# Patient Record
Sex: FEMALE | Race: WHITE | NOT HISPANIC OR LATINO | URBAN - METROPOLITAN AREA
[De-identification: names, ages, dates, MRNs, and addresses within clinical notes are randomized per-mention and may not be internally consistent; named-entity substitution may affect disease eponyms.]

---

## 2017-01-22 ENCOUNTER — INPATIENT (INPATIENT)
Facility: HOSPITAL | Age: 82
LOS: 7 days | Discharge: HOME | DRG: 292 | End: 2017-01-30
Attending: INTERNAL MEDICINE | Admitting: INTERNAL MEDICINE
Payer: MEDICARE

## 2017-01-22 VITALS
WEIGHT: 134.92 LBS | RESPIRATION RATE: 16 BRPM | OXYGEN SATURATION: 98 % | DIASTOLIC BLOOD PRESSURE: 98 MMHG | HEART RATE: 67 BPM | HEIGHT: 61 IN | TEMPERATURE: 98 F | SYSTOLIC BLOOD PRESSURE: 222 MMHG

## 2017-01-22 PROCEDURE — 71010: CPT | Mod: 26

## 2017-01-22 PROCEDURE — 70450 CT HEAD/BRAIN W/O DYE: CPT | Mod: 26

## 2017-01-22 NOTE — ED PROVIDER NOTE - MEDICAL DECISION MAKING DETAILS
Pt is a 84F with HTN, generalized weakness. labs done. CT head done. pt admitted for atypical presentation of ACS. Pt

## 2017-01-22 NOTE — ED PROVIDER NOTE - OBJECTIVE STATEMENT
Pt is a 84F who presents to ED for high blood pressure today. pt has significant hx of HTN and DM and chronic headaches for the past month. Pt states she has been having a constant dull headache for the past month. Today Pt is a 84F who presents to ED for high blood pressure and dizziness today. pt has significant hx of HTN and DM and chronic headaches for the past month. Pt states she has been having a constant dull headache for the past month. Today, pt complains that her blood pressure has been elevated all day despite taking prescribed medications. Pt has no numbness or tingling, no SOB. PMD: Dr. Ambriz

## 2017-01-23 DIAGNOSIS — I10 ESSENTIAL (PRIMARY) HYPERTENSION: ICD-10-CM

## 2017-01-23 DIAGNOSIS — A60.09 HERPESVIRAL INFECTION OF OTHER UROGENITAL TRACT: ICD-10-CM

## 2017-01-23 DIAGNOSIS — Z29.9 ENCOUNTER FOR PROPHYLACTIC MEASURES, UNSPECIFIED: ICD-10-CM

## 2017-01-23 DIAGNOSIS — R51 HEADACHE: ICD-10-CM

## 2017-01-23 DIAGNOSIS — E11.9 TYPE 2 DIABETES MELLITUS WITHOUT COMPLICATIONS: ICD-10-CM

## 2017-01-23 LAB
ALBUMIN SERPL ELPH-MCNC: 4 G/DL — SIGNIFICANT CHANGE UP (ref 3.5–5)
ALP SERPL-CCNC: 66 U/L — SIGNIFICANT CHANGE UP (ref 40–120)
ALT FLD-CCNC: 24 U/L DA — SIGNIFICANT CHANGE UP (ref 10–60)
ANION GAP SERPL CALC-SCNC: 11 MMOL/L — SIGNIFICANT CHANGE UP (ref 5–17)
APPEARANCE UR: CLEAR — SIGNIFICANT CHANGE UP
AST SERPL-CCNC: 29 U/L — SIGNIFICANT CHANGE UP (ref 10–40)
BASOPHILS # BLD AUTO: 0.1 K/UL — SIGNIFICANT CHANGE UP (ref 0–0.2)
BASOPHILS NFR BLD AUTO: 0.8 % — SIGNIFICANT CHANGE UP (ref 0–2)
BILIRUB SERPL-MCNC: 0.4 MG/DL — SIGNIFICANT CHANGE UP (ref 0.2–1.2)
BILIRUB UR-MCNC: NEGATIVE — SIGNIFICANT CHANGE UP
BUN SERPL-MCNC: 28 MG/DL — HIGH (ref 7–18)
CALCIUM SERPL-MCNC: 9 MG/DL — SIGNIFICANT CHANGE UP (ref 8.4–10.5)
CHLORIDE SERPL-SCNC: 96 MMOL/L — SIGNIFICANT CHANGE UP (ref 96–108)
CK MB BLD-MCNC: <3 % — SIGNIFICANT CHANGE UP (ref 0–3.5)
CK MB CFR SERPL CALC: <1 NG/ML — SIGNIFICANT CHANGE UP (ref 0–3.6)
CK SERPL-CCNC: 33 U/L — SIGNIFICANT CHANGE UP (ref 21–215)
CO2 SERPL-SCNC: 29 MMOL/L — SIGNIFICANT CHANGE UP (ref 22–31)
COLOR SPEC: YELLOW — SIGNIFICANT CHANGE UP
CREAT SERPL-MCNC: 0.93 MG/DL — SIGNIFICANT CHANGE UP (ref 0.5–1.3)
DIFF PNL FLD: NEGATIVE — SIGNIFICANT CHANGE UP
EOSINOPHIL # BLD AUTO: 0.3 K/UL — SIGNIFICANT CHANGE UP (ref 0–0.5)
EOSINOPHIL NFR BLD AUTO: 2.7 % — SIGNIFICANT CHANGE UP (ref 0–6)
GLUCOSE SERPL-MCNC: 204 MG/DL — HIGH (ref 70–99)
GLUCOSE UR QL: 50 MG/DL
HCT VFR BLD CALC: 35.9 % — SIGNIFICANT CHANGE UP (ref 34.5–45)
HGB BLD-MCNC: 12.1 G/DL — SIGNIFICANT CHANGE UP (ref 11.5–15.5)
KETONES UR-MCNC: NEGATIVE — SIGNIFICANT CHANGE UP
LEUKOCYTE ESTERASE UR-ACNC: NEGATIVE — SIGNIFICANT CHANGE UP
LYMPHOCYTES # BLD AUTO: 1.4 K/UL — SIGNIFICANT CHANGE UP (ref 1–3.3)
LYMPHOCYTES # BLD AUTO: 14.1 % — SIGNIFICANT CHANGE UP (ref 13–44)
MCHC RBC-ENTMCNC: 26.4 PG — LOW (ref 27–34)
MCHC RBC-ENTMCNC: 33.6 GM/DL — SIGNIFICANT CHANGE UP (ref 32–36)
MCV RBC AUTO: 78.5 FL — LOW (ref 80–100)
MONOCYTES # BLD AUTO: 0.4 K/UL — SIGNIFICANT CHANGE UP (ref 0–0.9)
MONOCYTES NFR BLD AUTO: 4.3 % — SIGNIFICANT CHANGE UP (ref 2–14)
NEUTROPHILS # BLD AUTO: 7.7 K/UL — HIGH (ref 1.8–7.4)
NEUTROPHILS NFR BLD AUTO: 78.1 % — HIGH (ref 43–77)
NITRITE UR-MCNC: NEGATIVE — SIGNIFICANT CHANGE UP
PH UR: 7 — SIGNIFICANT CHANGE UP (ref 4.8–8)
PLATELET # BLD AUTO: 296 K/UL — SIGNIFICANT CHANGE UP (ref 150–400)
POTASSIUM SERPL-MCNC: 4.4 MMOL/L — SIGNIFICANT CHANGE UP (ref 3.5–5.3)
POTASSIUM SERPL-SCNC: 4.4 MMOL/L — SIGNIFICANT CHANGE UP (ref 3.5–5.3)
PROT SERPL-MCNC: 7.8 G/DL — SIGNIFICANT CHANGE UP (ref 6–8.3)
PROT UR-MCNC: 100
RBC # BLD: 4.57 M/UL — SIGNIFICANT CHANGE UP (ref 3.8–5.2)
RBC # FLD: 12.7 % — SIGNIFICANT CHANGE UP (ref 10.3–14.5)
SODIUM SERPL-SCNC: 136 MMOL/L — SIGNIFICANT CHANGE UP (ref 135–145)
SP GR SPEC: 1.01 — SIGNIFICANT CHANGE UP (ref 1.01–1.02)
TROPONIN I SERPL-MCNC: <0.015 NG/ML — SIGNIFICANT CHANGE UP (ref 0–0.04)
UROBILINOGEN FLD QL: NEGATIVE — SIGNIFICANT CHANGE UP
WBC # BLD: 9.9 K/UL — SIGNIFICANT CHANGE UP (ref 3.8–10.5)
WBC # FLD AUTO: 9.9 K/UL — SIGNIFICANT CHANGE UP (ref 3.8–10.5)

## 2017-01-23 PROCEDURE — 99285 EMERGENCY DEPT VISIT HI MDM: CPT | Mod: 25

## 2017-01-23 RX ORDER — ACETAMINOPHEN 500 MG
650 TABLET ORAL EVERY 6 HOURS
Qty: 0 | Refills: 0 | Status: DISCONTINUED | OUTPATIENT
Start: 2017-01-23 | End: 2017-01-30

## 2017-01-23 RX ORDER — INSULIN LISPRO 100/ML
VIAL (ML) SUBCUTANEOUS
Qty: 0 | Refills: 0 | Status: DISCONTINUED | OUTPATIENT
Start: 2017-01-23 | End: 2017-01-29

## 2017-01-23 RX ORDER — SODIUM CHLORIDE 9 MG/ML
1000 INJECTION, SOLUTION INTRAVENOUS
Qty: 0 | Refills: 0 | Status: DISCONTINUED | OUTPATIENT
Start: 2017-01-23 | End: 2017-01-30

## 2017-01-23 RX ORDER — FUROSEMIDE 40 MG
40 TABLET ORAL DAILY
Qty: 0 | Refills: 0 | Status: DISCONTINUED | OUTPATIENT
Start: 2017-01-23 | End: 2017-01-23

## 2017-01-23 RX ORDER — GLUCAGON INJECTION, SOLUTION 0.5 MG/.1ML
1 INJECTION, SOLUTION SUBCUTANEOUS ONCE
Qty: 0 | Refills: 0 | Status: DISCONTINUED | OUTPATIENT
Start: 2017-01-23 | End: 2017-01-30

## 2017-01-23 RX ORDER — MEMANTINE HYDROCHLORIDE 10 MG/1
10 TABLET ORAL DAILY
Qty: 0 | Refills: 0 | Status: DISCONTINUED | OUTPATIENT
Start: 2017-01-23 | End: 2017-01-30

## 2017-01-23 RX ORDER — FUROSEMIDE 40 MG
20 TABLET ORAL DAILY
Qty: 0 | Refills: 0 | Status: DISCONTINUED | OUTPATIENT
Start: 2017-01-23 | End: 2017-01-23

## 2017-01-23 RX ORDER — ATORVASTATIN CALCIUM 80 MG/1
40 TABLET, FILM COATED ORAL AT BEDTIME
Qty: 0 | Refills: 0 | Status: DISCONTINUED | OUTPATIENT
Start: 2017-01-23 | End: 2017-01-30

## 2017-01-23 RX ORDER — NITROGLYCERIN 6.5 MG
0.4 CAPSULE, EXTENDED RELEASE ORAL ONCE
Qty: 0 | Refills: 0 | Status: DISCONTINUED | OUTPATIENT
Start: 2017-01-23 | End: 2017-01-29

## 2017-01-23 RX ORDER — DEXTROSE 50 % IN WATER 50 %
12.5 SYRINGE (ML) INTRAVENOUS ONCE
Qty: 0 | Refills: 0 | Status: DISCONTINUED | OUTPATIENT
Start: 2017-01-23 | End: 2017-01-30

## 2017-01-23 RX ORDER — ACYCLOVIR 50 MG/G
1 OINTMENT TOPICAL DAILY
Qty: 0 | Refills: 0 | Status: DISCONTINUED | OUTPATIENT
Start: 2017-01-23 | End: 2017-01-23

## 2017-01-23 RX ORDER — HUMAN INSULIN 100 [IU]/ML
0 INJECTION, SUSPENSION SUBCUTANEOUS
Qty: 0 | Refills: 0 | COMMUNITY

## 2017-01-23 RX ORDER — DEXTROSE 50 % IN WATER 50 %
25 SYRINGE (ML) INTRAVENOUS ONCE
Qty: 0 | Refills: 0 | Status: DISCONTINUED | OUTPATIENT
Start: 2017-01-23 | End: 2017-01-30

## 2017-01-23 RX ORDER — CHOLECALCIFEROL (VITAMIN D3) 125 MCG
1000 CAPSULE ORAL DAILY
Qty: 0 | Refills: 0 | Status: DISCONTINUED | OUTPATIENT
Start: 2017-01-23 | End: 2017-01-30

## 2017-01-23 RX ORDER — FUROSEMIDE 40 MG
40 TABLET ORAL ONCE
Qty: 0 | Refills: 0 | Status: COMPLETED | OUTPATIENT
Start: 2017-01-23 | End: 2017-01-23

## 2017-01-23 RX ORDER — LOSARTAN POTASSIUM 100 MG/1
50 TABLET, FILM COATED ORAL DAILY
Qty: 0 | Refills: 0 | Status: DISCONTINUED | OUTPATIENT
Start: 2017-01-23 | End: 2017-01-25

## 2017-01-23 RX ORDER — INSULIN LISPRO 100/ML
10 VIAL (ML) SUBCUTANEOUS
Qty: 0 | Refills: 0 | Status: DISCONTINUED | OUTPATIENT
Start: 2017-01-23 | End: 2017-01-29

## 2017-01-23 RX ORDER — INSULIN GLARGINE 100 [IU]/ML
0 INJECTION, SOLUTION SUBCUTANEOUS
Qty: 0 | Refills: 0 | COMMUNITY

## 2017-01-23 RX ORDER — AMLODIPINE BESYLATE 2.5 MG/1
10 TABLET ORAL DAILY
Qty: 0 | Refills: 0 | Status: DISCONTINUED | OUTPATIENT
Start: 2017-01-23 | End: 2017-01-28

## 2017-01-23 RX ORDER — LOSARTAN POTASSIUM 100 MG/1
1 TABLET, FILM COATED ORAL
Qty: 0 | Refills: 0 | COMMUNITY

## 2017-01-23 RX ORDER — CLOPIDOGREL BISULFATE 75 MG/1
75 TABLET, FILM COATED ORAL DAILY
Qty: 0 | Refills: 0 | Status: DISCONTINUED | OUTPATIENT
Start: 2017-01-23 | End: 2017-01-30

## 2017-01-23 RX ORDER — PANTOPRAZOLE SODIUM 20 MG/1
40 TABLET, DELAYED RELEASE ORAL
Qty: 0 | Refills: 0 | Status: DISCONTINUED | OUTPATIENT
Start: 2017-01-23 | End: 2017-01-30

## 2017-01-23 RX ORDER — ASPIRIN/CALCIUM CARB/MAGNESIUM 324 MG
81 TABLET ORAL DAILY
Qty: 0 | Refills: 0 | Status: DISCONTINUED | OUTPATIENT
Start: 2017-01-23 | End: 2017-01-30

## 2017-01-23 RX ORDER — DEXTROSE 50 % IN WATER 50 %
1 SYRINGE (ML) INTRAVENOUS ONCE
Qty: 0 | Refills: 0 | Status: DISCONTINUED | OUTPATIENT
Start: 2017-01-23 | End: 2017-01-30

## 2017-01-23 RX ORDER — INSULIN GLARGINE 100 [IU]/ML
20 INJECTION, SOLUTION SUBCUTANEOUS AT BEDTIME
Qty: 0 | Refills: 0 | Status: DISCONTINUED | OUTPATIENT
Start: 2017-01-23 | End: 2017-01-30

## 2017-01-23 RX ORDER — DOCOSANOL 100 MG/G
1 CREAM TOPICAL DAILY
Qty: 0 | Refills: 0 | Status: DISCONTINUED | OUTPATIENT
Start: 2017-01-23 | End: 2017-01-30

## 2017-01-23 RX ORDER — FUROSEMIDE 40 MG
40 TABLET ORAL DAILY
Qty: 0 | Refills: 0 | Status: DISCONTINUED | OUTPATIENT
Start: 2017-01-23 | End: 2017-01-25

## 2017-01-23 RX ORDER — NYSTATIN CREAM 100000 [USP'U]/G
1 CREAM TOPICAL DAILY
Qty: 0 | Refills: 0 | Status: DISCONTINUED | OUTPATIENT
Start: 2017-01-23 | End: 2017-01-30

## 2017-01-23 RX ORDER — FENOFIBRATE,MICRONIZED 130 MG
145 CAPSULE ORAL DAILY
Qty: 0 | Refills: 0 | Status: DISCONTINUED | OUTPATIENT
Start: 2017-01-23 | End: 2017-01-30

## 2017-01-23 RX ORDER — NITROGLYCERIN 6.5 MG
0.4 CAPSULE, EXTENDED RELEASE ORAL ONCE
Qty: 0 | Refills: 0 | Status: COMPLETED | OUTPATIENT
Start: 2017-01-23 | End: 2017-01-23

## 2017-01-23 RX ADMIN — Medication 81 MILLIGRAM(S): at 11:29

## 2017-01-23 RX ADMIN — CLOPIDOGREL BISULFATE 75 MILLIGRAM(S): 75 TABLET, FILM COATED ORAL at 12:28

## 2017-01-23 RX ADMIN — Medication: at 13:34

## 2017-01-23 RX ADMIN — Medication 40 MILLIGRAM(S): at 05:38

## 2017-01-23 RX ADMIN — INSULIN GLARGINE 20 UNIT(S): 100 INJECTION, SOLUTION SUBCUTANEOUS at 23:52

## 2017-01-23 RX ADMIN — LOSARTAN POTASSIUM 50 MILLIGRAM(S): 100 TABLET, FILM COATED ORAL at 05:38

## 2017-01-23 RX ADMIN — Medication 145 MILLIGRAM(S): at 11:29

## 2017-01-23 RX ADMIN — Medication 10 UNIT(S): at 13:04

## 2017-01-23 RX ADMIN — NYSTATIN CREAM 1 APPLICATION(S): 100000 CREAM TOPICAL at 11:28

## 2017-01-23 RX ADMIN — Medication: at 17:07

## 2017-01-23 RX ADMIN — DOCOSANOL 1 APPLICATION(S): 100 CREAM TOPICAL at 11:28

## 2017-01-23 RX ADMIN — Medication 40 MILLIGRAM(S): at 02:00

## 2017-01-23 RX ADMIN — Medication 1000 UNIT(S): at 11:57

## 2017-01-23 RX ADMIN — ATORVASTATIN CALCIUM 40 MILLIGRAM(S): 80 TABLET, FILM COATED ORAL at 21:37

## 2017-01-23 RX ADMIN — Medication 650 MILLIGRAM(S): at 06:52

## 2017-01-23 RX ADMIN — MEMANTINE HYDROCHLORIDE 10 MILLIGRAM(S): 10 TABLET ORAL at 11:29

## 2017-01-23 RX ADMIN — AMLODIPINE BESYLATE 10 MILLIGRAM(S): 2.5 TABLET ORAL at 05:38

## 2017-01-23 RX ADMIN — Medication 0.4 MILLIGRAM(S): at 02:00

## 2017-01-23 RX ADMIN — Medication 650 MILLIGRAM(S): at 06:54

## 2017-01-23 RX ADMIN — Medication 10 UNIT(S): at 17:07

## 2017-01-23 RX ADMIN — PANTOPRAZOLE SODIUM 40 MILLIGRAM(S): 20 TABLET, DELAYED RELEASE ORAL at 06:06

## 2017-01-23 NOTE — ED ADULT NURSE REASSESSMENT NOTE - NS ED NURSE REASSESS COMMENT FT1
Fany Bella paged via [facility] . Notified despite BP medication given , BP remains high. Medicated for HA .
assumed care of pt from previous RN Sochet in stable condition. pt a&ox3, in NAD. offers no complaints at this time. denies any pain. pending bed availability on unit.
Pt. remains a&ox3, stable and in no acute distress.

## 2017-01-23 NOTE — H&P ADULT. - RS GEN PE MLT RESP DETAILS PC
good air movement/respirations non-labored/breath sounds equal/clear to auscultation bilaterally/airway patent/normal

## 2017-01-23 NOTE — H&P ADULT. - PROBLEM SELECTOR PLAN 3
- occipital headache secondary to uncontrolled hypertension   - improved when BP was reduced  - will give Tylenol for headaches as needed

## 2017-01-23 NOTE — H&P ADULT. - HISTORY OF PRESENT ILLNESS
[place]rem[place]  speaking. : [id number]   Pt is a 84F who presents to ED for high blood pressure and dizziness today. pt has significant hx of HTN and DM and chronic headaches for the past month. Pt states she has been having a constant dull headache for the past month. Today, pt complains that her blood pressure has been elevated all day despite taking prescribed medications. Pt has no numbness or tingling, no SOB. [place] speaking. : [id number]  Patient is a 85y/o F from home with PMH of  HTN, DM, CVA s/p tPA (Aug, 2015), PVD s/p stent in right leg, HFpEF (had HFrEF 45% but EF improved, Grade III DD) dementia, shingles and genital herpes presented with occipital headaches and hypertension.      Patient has had persisted headaches associated with hypertension. She had a BP of 208/92, 198/97, 190/100 at 11.30 tonight. Reports severe occipital headaches, on and off since many years. She said she gets admitted, remains stable for few months then gets the same symptoms. Daughter tried to give home medications without much relief and patient was complaining of dizziness hence daughter brought her to the ED. Patient had hypertension in the previous three visit as well. In Feb, 2015 admitted for heart failure and sepsis due to pneumonia and SBP was 200s. In April, 2015 patient was admitted for CVA and SBP was 180. In Aug, 2015 patient was admitted again for CVA and BP was 210/100mmhg. Discussed with daughter as well, patient is complaint with her medications and low salt diet. No chest pain, chest pressure or chest discomfort. No palpitations or edema. No tightness, heaviness or aching about the chest, neck, axilla or epigastrium. No anorexia, nausea, vomiting, constipation or diarrhea. No abdominal pain or blood in stools.  Non smoker. [place]ian speaking. : [id number]  Patient is a 85y/o F from home, independent at baseline,  with PMH of  HTN, DM, CVA s/p tPA (Aug, 2015), PVD s/p stent in right leg, HFpEF (had HFrEF 45% but EF improved, Grade III DD) dementia, shingles and genital herpes presented with occipital headaches and hypertension.      Patient has had persisted headaches associated with hypertension. She had a BP of 208/92, 198/97, 190/100 at 11.30 tonight. Reports severe occipital headaches, on and off since many years. She said she gets admitted, remains stable for few months then gets the same symptoms. Daughter tried to give home medications without much relief and patient was complaining of dizziness hence daughter brought her to the ED. Patient had hypertension in the previous three visit as well. In Feb, 2015 admitted for heart failure and sepsis due to pneumonia and SBP was 200s. In April, 2015 patient was admitted for CVA and SBP was 180. In Aug, 2015 patient was admitted again for CVA and BP was 210/100mmhg. Discussed with daughter as well, patient is complaint with her medications and low salt diet. No chest pain, chest pressure or chest discomfort. No palpitations or edema. No tightness, heaviness or aching about the chest, neck, axilla or epigastrium. No anorexia, nausea, vomiting, constipation or diarrhea. No abdominal pain or blood in stools.  Non smoker.

## 2017-01-23 NOTE — PATIENT PROFILE ADULT. - ABILITY TO HEAR (WITH HEARING AID OR HEARING APPLIANCE IF NORMALLY USED):
left side [place]/Mildly to Moderately Impaired: difficulty hearing in some environments or speaker may need to increase volume or speak distinctly

## 2017-01-23 NOTE — H&P ADULT. - PROBLEM SELECTOR PLAN 1
- patient has elevated pro-BNP in view on b/l inspiratory fine crackles suggestive of heart failure   - patient received 40units of lasix in the ED   - saturating over 90% in room air, no respiratory distress  - will give lasix 20iv daily  - continue losartan, aspirin and statin at home dose   - f/u urine output  - f/u daily weight   - f/u ECHO (TTE 2/22/2015: EF 45-50%, no thrombus, mild segmental left ventricular dysfunction, hypokinetic mid and distal septum and apex, grade II DD   and TTE 4/28/2015: EF 55-60%, no thrombus, grade II DD, RVSP mildly increased to 33)  - f/u cardiology recs - patient has elevated pro-BNP in view on b/l inspiratory fine crackles suggestive of heart failure   - patient received 40units of lasix in the ED and went to bathroom 2-3 times  - saturating over 90% in room air, no respiratory distress  - will give lasix 20iv daily  - continue losartan, aspirin and statin at home dose   - f/u urine output (no Kuhn's, patient alert can urinate in bed pan; instructed the nurse)   - f/u daily weight   - f/u ECHO (TTE 2/22/2015: EF 45-50%, no thrombus, mild segmental left ventricular dysfunction, hypokinetic mid and distal septum and apex, grade II DD   and TTE 4/28/2015: EF 55-60%, no thrombus, grade II DD, RVSP mildly increased to 33)  - f/u cardiology recs - patient has elevated pro-BNP in view on b/l inspiratory fine crackles suggestive of heart failure   - patient received 40units of lasix in the ED and went to bathroom 2-3 times  - saturating over 90% in room air, no respiratory distress  - will give lasix 40iv daily  - continue losartan, aspirin and statin at home dose   - f/u urine output (no Kuhn's, patient alert can urinate in bed pan; instructed the nurse)   - f/u daily weight   - f/u ECHO (TTE 2/22/2015: EF 45-50%, no thrombus, mild segmental left ventricular dysfunction, hypokinetic mid and distal septum and apex, grade II DD   and TTE 4/28/2015: EF 55-60%, no thrombus, grade II DD, RVSP mildly increased to 33)  - f/u cardiology recs

## 2017-01-23 NOTE — H&P ADULT. - ASSESSMENT
Patient is a 85y/o F from home with PMH of  HTN, DM, CVA s/p tPA (Aug, 2015), PVD s/p stent in right leg, HFpEF (had HFrEF 45% but EF improved, Grade III DD) dementia, shingles and genital herpes presented with occipital headaches and hypertension.

## 2017-01-23 NOTE — H&P ADULT. - PROBLEM SELECTOR PLAN 2
- uncontrolled hypertension   - most likely secondary to heart failure exacerbation (flash pulmonary edema)  - will continue home medications as BP controlled in ED after single nitroglycerin sublingual and lasix  - will get urinary catecholamines, urinary metanephrine, TSH, renin and CMP in am to evaluate for secondary causes of hypertension   - consider dexamethasone suppression test and Renal doppler US if negative

## 2017-01-24 LAB
ALBUMIN SERPL ELPH-MCNC: 4 G/DL — SIGNIFICANT CHANGE UP (ref 3.5–5)
ALP SERPL-CCNC: 64 U/L — SIGNIFICANT CHANGE UP (ref 40–120)
ALT FLD-CCNC: 20 U/L DA — SIGNIFICANT CHANGE UP (ref 10–60)
ANION GAP SERPL CALC-SCNC: 12 MMOL/L — SIGNIFICANT CHANGE UP (ref 5–17)
AST SERPL-CCNC: 13 U/L — SIGNIFICANT CHANGE UP (ref 10–40)
BASOPHILS # BLD AUTO: 0.1 K/UL — SIGNIFICANT CHANGE UP (ref 0–0.2)
BASOPHILS NFR BLD AUTO: 1.1 % — SIGNIFICANT CHANGE UP (ref 0–2)
BILIRUB SERPL-MCNC: 0.4 MG/DL — SIGNIFICANT CHANGE UP (ref 0.2–1.2)
BUN SERPL-MCNC: 27 MG/DL — HIGH (ref 7–18)
CALCIUM SERPL-MCNC: 9.5 MG/DL — SIGNIFICANT CHANGE UP (ref 8.4–10.5)
CHLORIDE SERPL-SCNC: 93 MMOL/L — LOW (ref 96–108)
CHOLEST SERPL-MCNC: 236 MG/DL — HIGH (ref 10–199)
CK MB BLD-MCNC: <3.1 % — SIGNIFICANT CHANGE UP (ref 0–3.5)
CK MB CFR SERPL CALC: <1 NG/ML — SIGNIFICANT CHANGE UP (ref 0–3.6)
CK SERPL-CCNC: 32 U/L — SIGNIFICANT CHANGE UP (ref 21–215)
CO2 SERPL-SCNC: 30 MMOL/L — SIGNIFICANT CHANGE UP (ref 22–31)
CREAT SERPL-MCNC: 1.04 MG/DL — SIGNIFICANT CHANGE UP (ref 0.5–1.3)
EOSINOPHIL # BLD AUTO: 0.3 K/UL — SIGNIFICANT CHANGE UP (ref 0–0.5)
EOSINOPHIL NFR BLD AUTO: 3.7 % — SIGNIFICANT CHANGE UP (ref 0–6)
GLUCOSE SERPL-MCNC: 219 MG/DL — HIGH (ref 70–99)
HBA1C BLD-MCNC: 8 % — HIGH (ref 4–5.6)
HBA1C BLD-MCNC: 8.1 % — HIGH (ref 4–5.6)
HCT VFR BLD CALC: 39.2 % — SIGNIFICANT CHANGE UP (ref 34.5–45)
HDLC SERPL-MCNC: 53 MG/DL — SIGNIFICANT CHANGE UP (ref 40–125)
HGB BLD-MCNC: 12.7 G/DL — SIGNIFICANT CHANGE UP (ref 11.5–15.5)
LIPID PNL WITH DIRECT LDL SERPL: 128 MG/DL — SIGNIFICANT CHANGE UP
LYMPHOCYTES # BLD AUTO: 1.6 K/UL — SIGNIFICANT CHANGE UP (ref 1–3.3)
LYMPHOCYTES # BLD AUTO: 22.4 % — SIGNIFICANT CHANGE UP (ref 13–44)
MAGNESIUM SERPL-MCNC: 2.1 MG/DL — SIGNIFICANT CHANGE UP (ref 1.8–2.4)
MCHC RBC-ENTMCNC: 25.9 PG — LOW (ref 27–34)
MCHC RBC-ENTMCNC: 32.3 GM/DL — SIGNIFICANT CHANGE UP (ref 32–36)
MCV RBC AUTO: 80.2 FL — SIGNIFICANT CHANGE UP (ref 80–100)
MONOCYTES # BLD AUTO: 0.3 K/UL — SIGNIFICANT CHANGE UP (ref 0–0.9)
MONOCYTES NFR BLD AUTO: 4.2 % — SIGNIFICANT CHANGE UP (ref 2–14)
NEUTROPHILS # BLD AUTO: 4.8 K/UL — SIGNIFICANT CHANGE UP (ref 1.8–7.4)
NEUTROPHILS NFR BLD AUTO: 68.6 % — SIGNIFICANT CHANGE UP (ref 43–77)
PHOSPHATE SERPL-MCNC: 3.6 MG/DL — SIGNIFICANT CHANGE UP (ref 2.5–4.5)
PLATELET # BLD AUTO: 276 K/UL — SIGNIFICANT CHANGE UP (ref 150–400)
POTASSIUM SERPL-MCNC: 3.6 MMOL/L — SIGNIFICANT CHANGE UP (ref 3.5–5.3)
POTASSIUM SERPL-SCNC: 3.6 MMOL/L — SIGNIFICANT CHANGE UP (ref 3.5–5.3)
PROT SERPL-MCNC: 7.9 G/DL — SIGNIFICANT CHANGE UP (ref 6–8.3)
RBC # BLD: 4.89 M/UL — SIGNIFICANT CHANGE UP (ref 3.8–5.2)
RBC # FLD: 13.2 % — SIGNIFICANT CHANGE UP (ref 10.3–14.5)
SODIUM SERPL-SCNC: 135 MMOL/L — SIGNIFICANT CHANGE UP (ref 135–145)
TOTAL CHOLESTEROL/HDL RATIO MEASUREMENT: 4.5 RATIO — SIGNIFICANT CHANGE UP (ref 3.3–7.1)
TRIGL SERPL-MCNC: 276 MG/DL — HIGH (ref 10–149)
TROPONIN I SERPL-MCNC: 0.02 NG/ML — SIGNIFICANT CHANGE UP (ref 0–0.04)
TSH SERPL-MCNC: 4.7 UU/ML — SIGNIFICANT CHANGE UP (ref 0.34–4.82)
WBC # BLD: 7 K/UL — SIGNIFICANT CHANGE UP (ref 3.8–10.5)
WBC # FLD AUTO: 7 K/UL — SIGNIFICANT CHANGE UP (ref 3.8–10.5)

## 2017-01-24 RX ADMIN — LOSARTAN POTASSIUM 50 MILLIGRAM(S): 100 TABLET, FILM COATED ORAL at 06:51

## 2017-01-24 RX ADMIN — Medication 40 MILLIGRAM(S): at 06:51

## 2017-01-24 RX ADMIN — DOCOSANOL 1 APPLICATION(S): 100 CREAM TOPICAL at 14:28

## 2017-01-24 RX ADMIN — Medication 10 UNIT(S): at 17:33

## 2017-01-24 RX ADMIN — INSULIN GLARGINE 20 UNIT(S): 100 INJECTION, SOLUTION SUBCUTANEOUS at 22:43

## 2017-01-24 RX ADMIN — Medication 650 MILLIGRAM(S): at 07:54

## 2017-01-24 RX ADMIN — MEMANTINE HYDROCHLORIDE 10 MILLIGRAM(S): 10 TABLET ORAL at 14:25

## 2017-01-24 RX ADMIN — Medication 1.25 MILLIGRAM(S): at 02:50

## 2017-01-24 RX ADMIN — ATORVASTATIN CALCIUM 40 MILLIGRAM(S): 80 TABLET, FILM COATED ORAL at 22:39

## 2017-01-24 RX ADMIN — Medication 2: at 09:25

## 2017-01-24 RX ADMIN — CLOPIDOGREL BISULFATE 75 MILLIGRAM(S): 75 TABLET, FILM COATED ORAL at 12:27

## 2017-01-24 RX ADMIN — NYSTATIN CREAM 1 APPLICATION(S): 100000 CREAM TOPICAL at 14:24

## 2017-01-24 RX ADMIN — Medication 145 MILLIGRAM(S): at 14:22

## 2017-01-24 RX ADMIN — Medication 81 MILLIGRAM(S): at 12:27

## 2017-01-24 RX ADMIN — Medication 650 MILLIGRAM(S): at 09:00

## 2017-01-24 RX ADMIN — Medication 650 MILLIGRAM(S): at 07:00

## 2017-01-24 RX ADMIN — Medication 1.25 MILLIGRAM(S): at 23:05

## 2017-01-24 RX ADMIN — Medication 650 MILLIGRAM(S): at 17:35

## 2017-01-24 RX ADMIN — Medication 4: at 12:27

## 2017-01-24 RX ADMIN — Medication 1000 UNIT(S): at 14:22

## 2017-01-24 RX ADMIN — PANTOPRAZOLE SODIUM 40 MILLIGRAM(S): 20 TABLET, DELAYED RELEASE ORAL at 06:51

## 2017-01-24 RX ADMIN — AMLODIPINE BESYLATE 10 MILLIGRAM(S): 2.5 TABLET ORAL at 06:51

## 2017-01-24 RX ADMIN — Medication 10 UNIT(S): at 12:27

## 2017-01-24 RX ADMIN — Medication 10 UNIT(S): at 09:24

## 2017-01-24 RX ADMIN — Medication 2: at 17:34

## 2017-01-25 LAB
ANION GAP SERPL CALC-SCNC: 10 MMOL/L — SIGNIFICANT CHANGE UP (ref 5–17)
BASOPHILS # BLD AUTO: 0.1 K/UL — SIGNIFICANT CHANGE UP (ref 0–0.2)
BASOPHILS NFR BLD AUTO: 1.1 % — SIGNIFICANT CHANGE UP (ref 0–2)
BUN SERPL-MCNC: 39 MG/DL — HIGH (ref 7–18)
CALCIUM SERPL-MCNC: 9.4 MG/DL — SIGNIFICANT CHANGE UP (ref 8.4–10.5)
CHLORIDE SERPL-SCNC: 95 MMOL/L — LOW (ref 96–108)
CO2 SERPL-SCNC: 29 MMOL/L — SIGNIFICANT CHANGE UP (ref 22–31)
CREAT SERPL-MCNC: 1.37 MG/DL — HIGH (ref 0.5–1.3)
EOSINOPHIL # BLD AUTO: 0.3 K/UL — SIGNIFICANT CHANGE UP (ref 0–0.5)
EOSINOPHIL NFR BLD AUTO: 4 % — SIGNIFICANT CHANGE UP (ref 0–6)
ERYTHROCYTE [SEDIMENTATION RATE] IN BLOOD: 22 MM/HR — HIGH (ref 0–20)
GLUCOSE SERPL-MCNC: 181 MG/DL — HIGH (ref 70–99)
HCT VFR BLD CALC: 33.8 % — LOW (ref 34.5–45)
HGB BLD-MCNC: 11.1 G/DL — LOW (ref 11.5–15.5)
LYMPHOCYTES # BLD AUTO: 1.5 K/UL — SIGNIFICANT CHANGE UP (ref 1–3.3)
LYMPHOCYTES # BLD AUTO: 23.2 % — SIGNIFICANT CHANGE UP (ref 13–44)
MAGNESIUM SERPL-MCNC: 2.1 MG/DL — SIGNIFICANT CHANGE UP (ref 1.8–2.4)
MCHC RBC-ENTMCNC: 26.3 PG — LOW (ref 27–34)
MCHC RBC-ENTMCNC: 32.9 GM/DL — SIGNIFICANT CHANGE UP (ref 32–36)
MCV RBC AUTO: 80 FL — SIGNIFICANT CHANGE UP (ref 80–100)
MONOCYTES # BLD AUTO: 0.4 K/UL — SIGNIFICANT CHANGE UP (ref 0–0.9)
MONOCYTES NFR BLD AUTO: 6.8 % — SIGNIFICANT CHANGE UP (ref 2–14)
NEUTROPHILS # BLD AUTO: 4.3 K/UL — SIGNIFICANT CHANGE UP (ref 1.8–7.4)
NEUTROPHILS NFR BLD AUTO: 64.9 % — SIGNIFICANT CHANGE UP (ref 43–77)
PHOSPHATE SERPL-MCNC: 4.6 MG/DL — HIGH (ref 2.5–4.5)
PLATELET # BLD AUTO: 231 K/UL — SIGNIFICANT CHANGE UP (ref 150–400)
POTASSIUM SERPL-MCNC: 3.7 MMOL/L — SIGNIFICANT CHANGE UP (ref 3.5–5.3)
POTASSIUM SERPL-SCNC: 3.7 MMOL/L — SIGNIFICANT CHANGE UP (ref 3.5–5.3)
RBC # BLD: 4.23 M/UL — SIGNIFICANT CHANGE UP (ref 3.8–5.2)
RBC # FLD: 13.4 % — SIGNIFICANT CHANGE UP (ref 10.3–14.5)
SODIUM SERPL-SCNC: 134 MMOL/L — LOW (ref 135–145)
WBC # BLD: 6.6 K/UL — SIGNIFICANT CHANGE UP (ref 3.8–10.5)
WBC # FLD AUTO: 6.6 K/UL — SIGNIFICANT CHANGE UP (ref 3.8–10.5)

## 2017-01-25 PROCEDURE — 99223 1ST HOSP IP/OBS HIGH 75: CPT

## 2017-01-25 RX ORDER — IBUPROFEN 200 MG
400 TABLET ORAL ONCE
Qty: 0 | Refills: 0 | Status: COMPLETED | OUTPATIENT
Start: 2017-01-25 | End: 2017-01-25

## 2017-01-25 RX ORDER — SODIUM CHLORIDE 9 MG/ML
1000 INJECTION, SOLUTION INTRAVENOUS
Qty: 0 | Refills: 0 | Status: DISCONTINUED | OUTPATIENT
Start: 2017-01-25 | End: 2017-01-25

## 2017-01-25 RX ORDER — LOSARTAN POTASSIUM 100 MG/1
25 TABLET, FILM COATED ORAL AT BEDTIME
Qty: 0 | Refills: 0 | Status: DISCONTINUED | OUTPATIENT
Start: 2017-01-25 | End: 2017-01-25

## 2017-01-25 RX ORDER — LOSARTAN POTASSIUM 100 MG/1
50 TABLET, FILM COATED ORAL AT BEDTIME
Qty: 0 | Refills: 0 | Status: DISCONTINUED | OUTPATIENT
Start: 2017-01-25 | End: 2017-01-27

## 2017-01-25 RX ORDER — SODIUM CHLORIDE 9 MG/ML
1000 INJECTION, SOLUTION INTRAVENOUS
Qty: 0 | Refills: 0 | Status: DISCONTINUED | OUTPATIENT
Start: 2017-01-25 | End: 2017-01-28

## 2017-01-25 RX ORDER — KETOROLAC TROMETHAMINE 30 MG/ML
15 SYRINGE (ML) INJECTION EVERY 12 HOURS
Qty: 0 | Refills: 0 | Status: DISCONTINUED | OUTPATIENT
Start: 2017-01-25 | End: 2017-01-27

## 2017-01-25 RX ADMIN — LOSARTAN POTASSIUM 50 MILLIGRAM(S): 100 TABLET, FILM COATED ORAL at 06:48

## 2017-01-25 RX ADMIN — Medication 3: at 12:01

## 2017-01-25 RX ADMIN — Medication 145 MILLIGRAM(S): at 11:58

## 2017-01-25 RX ADMIN — Medication 10 UNIT(S): at 07:30

## 2017-01-25 RX ADMIN — NYSTATIN CREAM 1 APPLICATION(S): 100000 CREAM TOPICAL at 18:19

## 2017-01-25 RX ADMIN — Medication 650 MILLIGRAM(S): at 09:25

## 2017-01-25 RX ADMIN — INSULIN GLARGINE 20 UNIT(S): 100 INJECTION, SOLUTION SUBCUTANEOUS at 23:40

## 2017-01-25 RX ADMIN — Medication 2: at 07:27

## 2017-01-25 RX ADMIN — Medication 400 MILLIGRAM(S): at 14:45

## 2017-01-25 RX ADMIN — CLOPIDOGREL BISULFATE 75 MILLIGRAM(S): 75 TABLET, FILM COATED ORAL at 11:58

## 2017-01-25 RX ADMIN — LOSARTAN POTASSIUM 50 MILLIGRAM(S): 100 TABLET, FILM COATED ORAL at 22:09

## 2017-01-25 RX ADMIN — AMLODIPINE BESYLATE 10 MILLIGRAM(S): 2.5 TABLET ORAL at 06:48

## 2017-01-25 RX ADMIN — Medication 10 UNIT(S): at 18:19

## 2017-01-25 RX ADMIN — PANTOPRAZOLE SODIUM 40 MILLIGRAM(S): 20 TABLET, DELAYED RELEASE ORAL at 06:48

## 2017-01-25 RX ADMIN — MEMANTINE HYDROCHLORIDE 10 MILLIGRAM(S): 10 TABLET ORAL at 11:58

## 2017-01-25 RX ADMIN — Medication 1: at 18:19

## 2017-01-25 RX ADMIN — Medication 15 MILLIGRAM(S): at 20:32

## 2017-01-25 RX ADMIN — Medication 1000 UNIT(S): at 11:58

## 2017-01-25 RX ADMIN — Medication 81 MILLIGRAM(S): at 11:58

## 2017-01-25 RX ADMIN — Medication 15 MILLIGRAM(S): at 22:04

## 2017-01-25 RX ADMIN — Medication 10 UNIT(S): at 11:59

## 2017-01-25 RX ADMIN — Medication 40 MILLIGRAM(S): at 06:47

## 2017-01-25 RX ADMIN — Medication 400 MILLIGRAM(S): at 15:15

## 2017-01-25 RX ADMIN — ATORVASTATIN CALCIUM 40 MILLIGRAM(S): 80 TABLET, FILM COATED ORAL at 22:09

## 2017-01-25 RX ADMIN — Medication 650 MILLIGRAM(S): at 10:03

## 2017-01-25 RX ADMIN — SODIUM CHLORIDE 60 MILLILITER(S): 9 INJECTION, SOLUTION INTRAVENOUS at 18:20

## 2017-01-26 LAB
ANION GAP SERPL CALC-SCNC: 5 MMOL/L — SIGNIFICANT CHANGE UP (ref 5–17)
BASOPHILS # BLD AUTO: 0.1 K/UL — SIGNIFICANT CHANGE UP (ref 0–0.2)
BASOPHILS NFR BLD AUTO: 0.8 % — SIGNIFICANT CHANGE UP (ref 0–2)
BUN SERPL-MCNC: 37 MG/DL — HIGH (ref 7–18)
CALCIUM SERPL-MCNC: 8.7 MG/DL — SIGNIFICANT CHANGE UP (ref 8.4–10.5)
CHLORIDE SERPL-SCNC: 96 MMOL/L — SIGNIFICANT CHANGE UP (ref 96–108)
CO2 SERPL-SCNC: 32 MMOL/L — HIGH (ref 22–31)
CREAT SERPL-MCNC: 0.98 MG/DL — SIGNIFICANT CHANGE UP (ref 0.5–1.3)
CRP SERPL-MCNC: 1.04 MG/DL — HIGH (ref 0–0.4)
EOSINOPHIL # BLD AUTO: 0.2 K/UL — SIGNIFICANT CHANGE UP (ref 0–0.5)
EOSINOPHIL NFR BLD AUTO: 3.7 % — SIGNIFICANT CHANGE UP (ref 0–6)
GLUCOSE SERPL-MCNC: 127 MG/DL — HIGH (ref 70–99)
HCT VFR BLD CALC: 32.6 % — LOW (ref 34.5–45)
HGB BLD-MCNC: 10.7 G/DL — LOW (ref 11.5–15.5)
LYMPHOCYTES # BLD AUTO: 1.4 K/UL — SIGNIFICANT CHANGE UP (ref 1–3.3)
LYMPHOCYTES # BLD AUTO: 20.9 % — SIGNIFICANT CHANGE UP (ref 13–44)
MAGNESIUM SERPL-MCNC: 2 MG/DL — SIGNIFICANT CHANGE UP (ref 1.8–2.4)
MCHC RBC-ENTMCNC: 26.5 PG — LOW (ref 27–34)
MCHC RBC-ENTMCNC: 32.9 GM/DL — SIGNIFICANT CHANGE UP (ref 32–36)
MCV RBC AUTO: 80.6 FL — SIGNIFICANT CHANGE UP (ref 80–100)
MONOCYTES # BLD AUTO: 0.5 K/UL — SIGNIFICANT CHANGE UP (ref 0–0.9)
MONOCYTES NFR BLD AUTO: 7 % — SIGNIFICANT CHANGE UP (ref 2–14)
NEUTROPHILS # BLD AUTO: 4.5 K/UL — SIGNIFICANT CHANGE UP (ref 1.8–7.4)
NEUTROPHILS NFR BLD AUTO: 67.6 % — SIGNIFICANT CHANGE UP (ref 43–77)
PHOSPHATE SERPL-MCNC: 3.5 MG/DL — SIGNIFICANT CHANGE UP (ref 2.5–4.5)
PLATELET # BLD AUTO: 195 K/UL — SIGNIFICANT CHANGE UP (ref 150–400)
POTASSIUM SERPL-MCNC: 4.5 MMOL/L — SIGNIFICANT CHANGE UP (ref 3.5–5.3)
POTASSIUM SERPL-SCNC: 4.5 MMOL/L — SIGNIFICANT CHANGE UP (ref 3.5–5.3)
RBC # BLD: 4.04 M/UL — SIGNIFICANT CHANGE UP (ref 3.8–5.2)
RBC # FLD: 13.2 % — SIGNIFICANT CHANGE UP (ref 10.3–14.5)
SODIUM SERPL-SCNC: 133 MMOL/L — LOW (ref 135–145)
WBC # BLD: 6.6 K/UL — SIGNIFICANT CHANGE UP (ref 3.8–10.5)
WBC # FLD AUTO: 6.6 K/UL — SIGNIFICANT CHANGE UP (ref 3.8–10.5)

## 2017-01-26 PROCEDURE — 70551 MRI BRAIN STEM W/O DYE: CPT | Mod: 26

## 2017-01-26 RX ORDER — ALPRAZOLAM 0.25 MG
0.25 TABLET ORAL ONCE
Qty: 0 | Refills: 0 | Status: DISCONTINUED | OUTPATIENT
Start: 2017-01-26 | End: 2017-01-26

## 2017-01-26 RX ADMIN — ATORVASTATIN CALCIUM 40 MILLIGRAM(S): 80 TABLET, FILM COATED ORAL at 22:04

## 2017-01-26 RX ADMIN — Medication 10 UNIT(S): at 12:11

## 2017-01-26 RX ADMIN — Medication 3: at 12:11

## 2017-01-26 RX ADMIN — Medication 0.25 MILLIGRAM(S): at 16:35

## 2017-01-26 RX ADMIN — MEMANTINE HYDROCHLORIDE 10 MILLIGRAM(S): 10 TABLET ORAL at 12:09

## 2017-01-26 RX ADMIN — Medication 81 MILLIGRAM(S): at 12:09

## 2017-01-26 RX ADMIN — Medication 1000 UNIT(S): at 12:09

## 2017-01-26 RX ADMIN — Medication 15 MILLIGRAM(S): at 23:09

## 2017-01-26 RX ADMIN — INSULIN GLARGINE 20 UNIT(S): 100 INJECTION, SOLUTION SUBCUTANEOUS at 22:05

## 2017-01-26 RX ADMIN — Medication 145 MILLIGRAM(S): at 12:09

## 2017-01-26 RX ADMIN — CLOPIDOGREL BISULFATE 75 MILLIGRAM(S): 75 TABLET, FILM COATED ORAL at 12:09

## 2017-01-26 RX ADMIN — Medication 15 MILLIGRAM(S): at 22:05

## 2017-01-26 RX ADMIN — NYSTATIN CREAM 1 APPLICATION(S): 100000 CREAM TOPICAL at 12:12

## 2017-01-26 RX ADMIN — LOSARTAN POTASSIUM 50 MILLIGRAM(S): 100 TABLET, FILM COATED ORAL at 22:04

## 2017-01-26 RX ADMIN — AMLODIPINE BESYLATE 10 MILLIGRAM(S): 2.5 TABLET ORAL at 06:04

## 2017-01-26 RX ADMIN — PANTOPRAZOLE SODIUM 40 MILLIGRAM(S): 20 TABLET, DELAYED RELEASE ORAL at 06:04

## 2017-01-27 LAB
ALDOST SERPL-MCNC: 17.1 NG/DL — SIGNIFICANT CHANGE UP
ANION GAP SERPL CALC-SCNC: 8 MMOL/L — SIGNIFICANT CHANGE UP (ref 5–17)
BASOPHILS # BLD AUTO: 0.1 K/UL — SIGNIFICANT CHANGE UP (ref 0–0.2)
BASOPHILS NFR BLD AUTO: 1.5 % — SIGNIFICANT CHANGE UP (ref 0–2)
BUN SERPL-MCNC: 34 MG/DL — HIGH (ref 7–18)
CALCIUM SERPL-MCNC: 9 MG/DL — SIGNIFICANT CHANGE UP (ref 8.4–10.5)
CHLORIDE SERPL-SCNC: 98 MMOL/L — SIGNIFICANT CHANGE UP (ref 96–108)
CO2 SERPL-SCNC: 30 MMOL/L — SIGNIFICANT CHANGE UP (ref 22–31)
CREAT SERPL-MCNC: 1.12 MG/DL — SIGNIFICANT CHANGE UP (ref 0.5–1.3)
EOSINOPHIL # BLD AUTO: 0.2 K/UL — SIGNIFICANT CHANGE UP (ref 0–0.5)
EOSINOPHIL NFR BLD AUTO: 4 % — SIGNIFICANT CHANGE UP (ref 0–6)
GLUCOSE SERPL-MCNC: 141 MG/DL — HIGH (ref 70–99)
HCT VFR BLD CALC: 31.5 % — LOW (ref 34.5–45)
HGB BLD-MCNC: 10.2 G/DL — LOW (ref 11.5–15.5)
LYMPHOCYTES # BLD AUTO: 1.3 K/UL — SIGNIFICANT CHANGE UP (ref 1–3.3)
LYMPHOCYTES # BLD AUTO: 25.3 % — SIGNIFICANT CHANGE UP (ref 13–44)
MAGNESIUM SERPL-MCNC: 2.1 MG/DL — SIGNIFICANT CHANGE UP (ref 1.8–2.4)
MCHC RBC-ENTMCNC: 26.5 PG — LOW (ref 27–34)
MCHC RBC-ENTMCNC: 32.4 GM/DL — SIGNIFICANT CHANGE UP (ref 32–36)
MCV RBC AUTO: 81.7 FL — SIGNIFICANT CHANGE UP (ref 80–100)
MONOCYTES # BLD AUTO: 0.3 K/UL — SIGNIFICANT CHANGE UP (ref 0–0.9)
MONOCYTES NFR BLD AUTO: 6.3 % — SIGNIFICANT CHANGE UP (ref 2–14)
NEUTROPHILS # BLD AUTO: 3.2 K/UL — SIGNIFICANT CHANGE UP (ref 1.8–7.4)
NEUTROPHILS NFR BLD AUTO: 62.9 % — SIGNIFICANT CHANGE UP (ref 43–77)
PHOSPHATE SERPL-MCNC: 4.2 MG/DL — SIGNIFICANT CHANGE UP (ref 2.5–4.5)
PLATELET # BLD AUTO: 211 K/UL — SIGNIFICANT CHANGE UP (ref 150–400)
POTASSIUM SERPL-MCNC: 4.4 MMOL/L — SIGNIFICANT CHANGE UP (ref 3.5–5.3)
POTASSIUM SERPL-SCNC: 4.4 MMOL/L — SIGNIFICANT CHANGE UP (ref 3.5–5.3)
RBC # BLD: 3.85 M/UL — SIGNIFICANT CHANGE UP (ref 3.8–5.2)
RBC # FLD: 13.9 % — SIGNIFICANT CHANGE UP (ref 10.3–14.5)
RENIN DIRECT, PLASMA: 5.9 PG/ML — SIGNIFICANT CHANGE UP
SODIUM SERPL-SCNC: 136 MMOL/L — SIGNIFICANT CHANGE UP (ref 135–145)
WBC # BLD: 5 K/UL — SIGNIFICANT CHANGE UP (ref 3.8–10.5)
WBC # FLD AUTO: 5 K/UL — SIGNIFICANT CHANGE UP (ref 3.8–10.5)

## 2017-01-27 PROCEDURE — 99233 SBSQ HOSP IP/OBS HIGH 50: CPT

## 2017-01-27 RX ORDER — FUROSEMIDE 40 MG
20 TABLET ORAL ONCE
Qty: 0 | Refills: 0 | Status: COMPLETED | OUTPATIENT
Start: 2017-01-27 | End: 2017-01-27

## 2017-01-27 RX ORDER — LOSARTAN POTASSIUM 100 MG/1
100 TABLET, FILM COATED ORAL AT BEDTIME
Qty: 0 | Refills: 0 | Status: DISCONTINUED | OUTPATIENT
Start: 2017-01-27 | End: 2017-01-28

## 2017-01-27 RX ADMIN — Medication 20 MILLIGRAM(S): at 00:24

## 2017-01-27 RX ADMIN — PANTOPRAZOLE SODIUM 40 MILLIGRAM(S): 20 TABLET, DELAYED RELEASE ORAL at 06:33

## 2017-01-27 RX ADMIN — Medication 10 UNIT(S): at 08:18

## 2017-01-27 RX ADMIN — Medication 145 MILLIGRAM(S): at 12:10

## 2017-01-27 RX ADMIN — MEMANTINE HYDROCHLORIDE 10 MILLIGRAM(S): 10 TABLET ORAL at 12:10

## 2017-01-27 RX ADMIN — INSULIN GLARGINE 20 UNIT(S): 100 INJECTION, SOLUTION SUBCUTANEOUS at 21:43

## 2017-01-27 RX ADMIN — LOSARTAN POTASSIUM 100 MILLIGRAM(S): 100 TABLET, FILM COATED ORAL at 21:43

## 2017-01-27 RX ADMIN — DOCOSANOL 1 APPLICATION(S): 100 CREAM TOPICAL at 13:59

## 2017-01-27 RX ADMIN — NYSTATIN CREAM 1 APPLICATION(S): 100000 CREAM TOPICAL at 12:10

## 2017-01-27 RX ADMIN — Medication 1000 UNIT(S): at 12:10

## 2017-01-27 RX ADMIN — Medication 1: at 12:11

## 2017-01-27 RX ADMIN — Medication 10 UNIT(S): at 12:11

## 2017-01-27 RX ADMIN — AMLODIPINE BESYLATE 10 MILLIGRAM(S): 2.5 TABLET ORAL at 06:33

## 2017-01-27 RX ADMIN — CLOPIDOGREL BISULFATE 75 MILLIGRAM(S): 75 TABLET, FILM COATED ORAL at 12:10

## 2017-01-27 RX ADMIN — Medication 10 UNIT(S): at 17:31

## 2017-01-27 RX ADMIN — Medication 81 MILLIGRAM(S): at 12:10

## 2017-01-27 RX ADMIN — ATORVASTATIN CALCIUM 40 MILLIGRAM(S): 80 TABLET, FILM COATED ORAL at 21:43

## 2017-01-27 RX ADMIN — Medication 15 MILLIGRAM(S): at 17:00

## 2017-01-27 RX ADMIN — Medication 15 MILLIGRAM(S): at 16:30

## 2017-01-28 LAB
ANION GAP SERPL CALC-SCNC: 9 MMOL/L — SIGNIFICANT CHANGE UP (ref 5–17)
BASOPHILS # BLD AUTO: 0.1 K/UL — SIGNIFICANT CHANGE UP (ref 0–0.2)
BASOPHILS NFR BLD AUTO: 1.2 % — SIGNIFICANT CHANGE UP (ref 0–2)
BUN SERPL-MCNC: 37 MG/DL — HIGH (ref 7–18)
CALCIUM SERPL-MCNC: 9.6 MG/DL — SIGNIFICANT CHANGE UP (ref 8.4–10.5)
CHLORIDE SERPL-SCNC: 102 MMOL/L — SIGNIFICANT CHANGE UP (ref 96–108)
CO2 SERPL-SCNC: 26 MMOL/L — SIGNIFICANT CHANGE UP (ref 22–31)
CREAT SERPL-MCNC: 1.18 MG/DL — SIGNIFICANT CHANGE UP (ref 0.5–1.3)
EOSINOPHIL # BLD AUTO: 0.2 K/UL — SIGNIFICANT CHANGE UP (ref 0–0.5)
EOSINOPHIL NFR BLD AUTO: 2.5 % — SIGNIFICANT CHANGE UP (ref 0–6)
GLUCOSE SERPL-MCNC: 204 MG/DL — HIGH (ref 70–99)
HCT VFR BLD CALC: 34.6 % — SIGNIFICANT CHANGE UP (ref 34.5–45)
HGB BLD-MCNC: 11.2 G/DL — LOW (ref 11.5–15.5)
LYMPHOCYTES # BLD AUTO: 1.3 K/UL — SIGNIFICANT CHANGE UP (ref 1–3.3)
LYMPHOCYTES # BLD AUTO: 16.6 % — SIGNIFICANT CHANGE UP (ref 13–44)
MAGNESIUM SERPL-MCNC: 2.1 MG/DL — SIGNIFICANT CHANGE UP (ref 1.8–2.4)
MCHC RBC-ENTMCNC: 26.6 PG — LOW (ref 27–34)
MCHC RBC-ENTMCNC: 32.3 GM/DL — SIGNIFICANT CHANGE UP (ref 32–36)
MCV RBC AUTO: 82.5 FL — SIGNIFICANT CHANGE UP (ref 80–100)
METANEPH 24H UR-MRATE: SIGNIFICANT CHANGE UP
METANEPH UR-MCNC: SIGNIFICANT CHANGE UP
MONOCYTES # BLD AUTO: 0.5 K/UL — SIGNIFICANT CHANGE UP (ref 0–0.9)
MONOCYTES NFR BLD AUTO: 5.8 % — SIGNIFICANT CHANGE UP (ref 2–14)
NEUTROPHILS # BLD AUTO: 5.9 K/UL — SIGNIFICANT CHANGE UP (ref 1.8–7.4)
NEUTROPHILS NFR BLD AUTO: 74 % — SIGNIFICANT CHANGE UP (ref 43–77)
PHOSPHATE SERPL-MCNC: 3.3 MG/DL — SIGNIFICANT CHANGE UP (ref 2.5–4.5)
PLATELET # BLD AUTO: 218 K/UL — SIGNIFICANT CHANGE UP (ref 150–400)
POTASSIUM SERPL-MCNC: 4.4 MMOL/L — SIGNIFICANT CHANGE UP (ref 3.5–5.3)
POTASSIUM SERPL-SCNC: 4.4 MMOL/L — SIGNIFICANT CHANGE UP (ref 3.5–5.3)
RBC # BLD: 4.2 M/UL — SIGNIFICANT CHANGE UP (ref 3.8–5.2)
RBC # FLD: 13.6 % — SIGNIFICANT CHANGE UP (ref 10.3–14.5)
SODIUM SERPL-SCNC: 137 MMOL/L — SIGNIFICANT CHANGE UP (ref 135–145)
WBC # BLD: 8 K/UL — SIGNIFICANT CHANGE UP (ref 3.8–10.5)
WBC # FLD AUTO: 8 K/UL — SIGNIFICANT CHANGE UP (ref 3.8–10.5)

## 2017-01-28 RX ORDER — LANOLIN ALCOHOL/MO/W.PET/CERES
3 CREAM (GRAM) TOPICAL ONCE
Qty: 0 | Refills: 0 | Status: COMPLETED | OUTPATIENT
Start: 2017-01-28 | End: 2017-01-28

## 2017-01-28 RX ORDER — HYDRALAZINE HCL 50 MG
10 TABLET ORAL ONCE
Qty: 0 | Refills: 0 | Status: COMPLETED | OUTPATIENT
Start: 2017-01-28 | End: 2017-01-28

## 2017-01-28 RX ORDER — AMLODIPINE BESYLATE 2.5 MG/1
10 TABLET ORAL AT BEDTIME
Qty: 0 | Refills: 0 | Status: DISCONTINUED | OUTPATIENT
Start: 2017-01-28 | End: 2017-01-30

## 2017-01-28 RX ORDER — METOPROLOL TARTRATE 50 MG
50 TABLET ORAL
Qty: 0 | Refills: 0 | Status: DISCONTINUED | OUTPATIENT
Start: 2017-01-28 | End: 2017-01-30

## 2017-01-28 RX ORDER — METOPROLOL TARTRATE 50 MG
25 TABLET ORAL ONCE
Qty: 0 | Refills: 0 | Status: DISCONTINUED | OUTPATIENT
Start: 2017-01-28 | End: 2017-01-28

## 2017-01-28 RX ADMIN — Medication 10 UNIT(S): at 08:38

## 2017-01-28 RX ADMIN — Medication 145 MILLIGRAM(S): at 11:50

## 2017-01-28 RX ADMIN — CLOPIDOGREL BISULFATE 75 MILLIGRAM(S): 75 TABLET, FILM COATED ORAL at 11:50

## 2017-01-28 RX ADMIN — MEMANTINE HYDROCHLORIDE 10 MILLIGRAM(S): 10 TABLET ORAL at 11:50

## 2017-01-28 RX ADMIN — NYSTATIN CREAM 1 APPLICATION(S): 100000 CREAM TOPICAL at 11:54

## 2017-01-28 RX ADMIN — Medication 3: at 11:50

## 2017-01-28 RX ADMIN — INSULIN GLARGINE 20 UNIT(S): 100 INJECTION, SOLUTION SUBCUTANEOUS at 21:43

## 2017-01-28 RX ADMIN — Medication 81 MILLIGRAM(S): at 14:07

## 2017-01-28 RX ADMIN — Medication 1: at 17:20

## 2017-01-28 RX ADMIN — Medication 650 MILLIGRAM(S): at 06:56

## 2017-01-28 RX ADMIN — Medication 1: at 08:37

## 2017-01-28 RX ADMIN — Medication 10 MILLIGRAM(S): at 03:21

## 2017-01-28 RX ADMIN — Medication 60 MILLIGRAM(S): at 21:43

## 2017-01-28 RX ADMIN — Medication 650 MILLIGRAM(S): at 21:43

## 2017-01-28 RX ADMIN — Medication 3 MILLIGRAM(S): at 22:41

## 2017-01-28 RX ADMIN — PANTOPRAZOLE SODIUM 40 MILLIGRAM(S): 20 TABLET, DELAYED RELEASE ORAL at 06:12

## 2017-01-28 RX ADMIN — Medication 10 UNIT(S): at 11:51

## 2017-01-28 RX ADMIN — AMLODIPINE BESYLATE 10 MILLIGRAM(S): 2.5 TABLET ORAL at 21:43

## 2017-01-28 RX ADMIN — ATORVASTATIN CALCIUM 40 MILLIGRAM(S): 80 TABLET, FILM COATED ORAL at 21:43

## 2017-01-28 RX ADMIN — AMLODIPINE BESYLATE 10 MILLIGRAM(S): 2.5 TABLET ORAL at 05:48

## 2017-01-28 RX ADMIN — Medication 10 UNIT(S): at 17:20

## 2017-01-28 RX ADMIN — Medication 1000 UNIT(S): at 11:50

## 2017-01-28 RX ADMIN — Medication 50 MILLIGRAM(S): at 17:19

## 2017-01-28 RX ADMIN — DOCOSANOL 1 APPLICATION(S): 100 CREAM TOPICAL at 11:55

## 2017-01-28 RX ADMIN — Medication 650 MILLIGRAM(S): at 05:52

## 2017-01-29 PROCEDURE — 71010: CPT | Mod: 26

## 2017-01-29 RX ORDER — INSULIN LISPRO 100/ML
15 VIAL (ML) SUBCUTANEOUS
Qty: 0 | Refills: 0 | Status: DISCONTINUED | OUTPATIENT
Start: 2017-01-29 | End: 2017-01-30

## 2017-01-29 RX ORDER — INSULIN HUMAN 100 [IU]/ML
10 INJECTION, SOLUTION SUBCUTANEOUS ONCE
Qty: 0 | Refills: 0 | Status: COMPLETED | OUTPATIENT
Start: 2017-01-29 | End: 2017-01-29

## 2017-01-29 RX ORDER — LOSARTAN POTASSIUM 100 MG/1
50 TABLET, FILM COATED ORAL DAILY
Qty: 0 | Refills: 0 | Status: DISCONTINUED | OUTPATIENT
Start: 2017-01-29 | End: 2017-01-30

## 2017-01-29 RX ORDER — INSULIN LISPRO 100/ML
VIAL (ML) SUBCUTANEOUS
Qty: 0 | Refills: 0 | Status: DISCONTINUED | OUTPATIENT
Start: 2017-01-29 | End: 2017-01-30

## 2017-01-29 RX ORDER — HUMAN INSULIN 100 [IU]/ML
10 INJECTION, SUSPENSION SUBCUTANEOUS ONCE
Qty: 0 | Refills: 0 | Status: DISCONTINUED | OUTPATIENT
Start: 2017-01-29 | End: 2017-01-29

## 2017-01-29 RX ADMIN — ATORVASTATIN CALCIUM 40 MILLIGRAM(S): 80 TABLET, FILM COATED ORAL at 22:46

## 2017-01-29 RX ADMIN — Medication 1000 UNIT(S): at 12:29

## 2017-01-29 RX ADMIN — Medication 60 MILLIGRAM(S): at 06:45

## 2017-01-29 RX ADMIN — Medication 3: at 08:22

## 2017-01-29 RX ADMIN — Medication 10 UNIT(S): at 08:23

## 2017-01-29 RX ADMIN — Medication 50 MILLIGRAM(S): at 17:36

## 2017-01-29 RX ADMIN — Medication 650 MILLIGRAM(S): at 00:13

## 2017-01-29 RX ADMIN — AMLODIPINE BESYLATE 10 MILLIGRAM(S): 2.5 TABLET ORAL at 22:46

## 2017-01-29 RX ADMIN — Medication 15 UNIT(S): at 16:16

## 2017-01-29 RX ADMIN — Medication 81 MILLIGRAM(S): at 12:29

## 2017-01-29 RX ADMIN — LOSARTAN POTASSIUM 50 MILLIGRAM(S): 100 TABLET, FILM COATED ORAL at 12:28

## 2017-01-29 RX ADMIN — NYSTATIN CREAM 1 APPLICATION(S): 100000 CREAM TOPICAL at 12:29

## 2017-01-29 RX ADMIN — PANTOPRAZOLE SODIUM 40 MILLIGRAM(S): 20 TABLET, DELAYED RELEASE ORAL at 06:45

## 2017-01-29 RX ADMIN — INSULIN GLARGINE 20 UNIT(S): 100 INJECTION, SOLUTION SUBCUTANEOUS at 22:46

## 2017-01-29 RX ADMIN — Medication 145 MILLIGRAM(S): at 12:29

## 2017-01-29 RX ADMIN — DOCOSANOL 1 APPLICATION(S): 100 CREAM TOPICAL at 12:31

## 2017-01-29 RX ADMIN — CLOPIDOGREL BISULFATE 75 MILLIGRAM(S): 75 TABLET, FILM COATED ORAL at 12:29

## 2017-01-29 RX ADMIN — Medication 50 MILLIGRAM(S): at 06:45

## 2017-01-29 RX ADMIN — MEMANTINE HYDROCHLORIDE 10 MILLIGRAM(S): 10 TABLET ORAL at 12:28

## 2017-01-29 RX ADMIN — INSULIN HUMAN 10 UNIT(S): 100 INJECTION, SOLUTION SUBCUTANEOUS at 12:31

## 2017-01-29 RX ADMIN — INSULIN HUMAN 10 UNIT(S): 100 INJECTION, SOLUTION SUBCUTANEOUS at 16:15

## 2017-01-30 ENCOUNTER — TRANSCRIPTION ENCOUNTER (OUTPATIENT)
Age: 82
End: 2017-01-30

## 2017-01-30 VITALS
OXYGEN SATURATION: 98 % | RESPIRATION RATE: 18 BRPM | TEMPERATURE: 99 F | SYSTOLIC BLOOD PRESSURE: 145 MMHG | DIASTOLIC BLOOD PRESSURE: 70 MMHG | HEART RATE: 59 BPM

## 2017-01-30 LAB
CREAT ?TM UR-MCNC: 31.6 MG/DL — SIGNIFICANT CHANGE UP (ref 20–320)
DOPAMINE UR-MCNC: 78 MCG/G CR — SIGNIFICANT CHANGE UP (ref 40–390)
EPINEPH UR-MCNC: SIGNIFICANT CHANGE UP
EPINEPHRINE/NOREPINEPHRINE - RANDOM URINE: 32 MCG/G CR — SIGNIFICANT CHANGE UP (ref 9–74)
NOREPINEPH 24H UR-MCNC: 32 MCG/G CR — SIGNIFICANT CHANGE UP (ref 7–65)

## 2017-01-30 PROCEDURE — 86140 C-REACTIVE PROTEIN: CPT

## 2017-01-30 PROCEDURE — 81001 URINALYSIS AUTO W/SCOPE: CPT

## 2017-01-30 PROCEDURE — 70551 MRI BRAIN STEM W/O DYE: CPT

## 2017-01-30 PROCEDURE — 84484 ASSAY OF TROPONIN QUANT: CPT

## 2017-01-30 PROCEDURE — 99285 EMERGENCY DEPT VISIT HI MDM: CPT | Mod: 25

## 2017-01-30 PROCEDURE — 83036 HEMOGLOBIN GLYCOSYLATED A1C: CPT

## 2017-01-30 PROCEDURE — 83735 ASSAY OF MAGNESIUM: CPT

## 2017-01-30 PROCEDURE — 82384 ASSAY THREE CATECHOLAMINES: CPT

## 2017-01-30 PROCEDURE — 83835 ASSAY OF METANEPHRINES: CPT

## 2017-01-30 PROCEDURE — 84244 ASSAY OF RENIN: CPT

## 2017-01-30 PROCEDURE — 84100 ASSAY OF PHOSPHORUS: CPT

## 2017-01-30 PROCEDURE — 82550 ASSAY OF CK (CPK): CPT

## 2017-01-30 PROCEDURE — 70450 CT HEAD/BRAIN W/O DYE: CPT

## 2017-01-30 PROCEDURE — 93306 TTE W/DOPPLER COMPLETE: CPT

## 2017-01-30 PROCEDURE — 83880 ASSAY OF NATRIURETIC PEPTIDE: CPT

## 2017-01-30 PROCEDURE — 80048 BASIC METABOLIC PNL TOTAL CA: CPT

## 2017-01-30 PROCEDURE — 71045 X-RAY EXAM CHEST 1 VIEW: CPT

## 2017-01-30 PROCEDURE — 84443 ASSAY THYROID STIM HORMONE: CPT

## 2017-01-30 PROCEDURE — 76775 US EXAM ABDO BACK WALL LIM: CPT

## 2017-01-30 PROCEDURE — 85027 COMPLETE CBC AUTOMATED: CPT

## 2017-01-30 PROCEDURE — 76775 US EXAM ABDO BACK WALL LIM: CPT | Mod: 26

## 2017-01-30 PROCEDURE — 85652 RBC SED RATE AUTOMATED: CPT

## 2017-01-30 PROCEDURE — 82553 CREATINE MB FRACTION: CPT

## 2017-01-30 PROCEDURE — 93005 ELECTROCARDIOGRAM TRACING: CPT

## 2017-01-30 PROCEDURE — 99232 SBSQ HOSP IP/OBS MODERATE 35: CPT

## 2017-01-30 PROCEDURE — 80053 COMPREHEN METABOLIC PANEL: CPT

## 2017-01-30 PROCEDURE — 80061 LIPID PANEL: CPT

## 2017-01-30 PROCEDURE — 82088 ASSAY OF ALDOSTERONE: CPT

## 2017-01-30 RX ORDER — ENOXAPARIN SODIUM 100 MG/ML
40 INJECTION SUBCUTANEOUS DAILY
Qty: 0 | Refills: 0 | Status: DISCONTINUED | OUTPATIENT
Start: 2017-01-30 | End: 2017-01-30

## 2017-01-30 RX ORDER — LOSARTAN POTASSIUM 100 MG/1
1 TABLET, FILM COATED ORAL
Qty: 0 | Refills: 0 | COMMUNITY

## 2017-01-30 RX ORDER — CHOLECALCIFEROL (VITAMIN D3) 125 MCG
1 CAPSULE ORAL
Qty: 0 | Refills: 0 | COMMUNITY

## 2017-01-30 RX ORDER — AMLODIPINE BESYLATE 2.5 MG/1
1 TABLET ORAL
Qty: 0 | Refills: 0 | COMMUNITY
Start: 2017-01-30

## 2017-01-30 RX ORDER — ZALEPLON 10 MG
5 CAPSULE ORAL AT BEDTIME
Qty: 0 | Refills: 0 | Status: DISCONTINUED | OUTPATIENT
Start: 2017-01-30 | End: 2017-01-30

## 2017-01-30 RX ORDER — LOSARTAN POTASSIUM 100 MG/1
1 TABLET, FILM COATED ORAL
Qty: 0 | Refills: 0 | COMMUNITY
Start: 2017-01-30

## 2017-01-30 RX ORDER — ZOLPIDEM TARTRATE 10 MG/1
5 TABLET ORAL AT BEDTIME
Qty: 0 | Refills: 0 | Status: DISCONTINUED | OUTPATIENT
Start: 2017-01-30 | End: 2017-01-30

## 2017-01-30 RX ORDER — CHOLECALCIFEROL (VITAMIN D3) 125 MCG
1000 CAPSULE ORAL
Qty: 0 | Refills: 0 | COMMUNITY
Start: 2017-01-30

## 2017-01-30 RX ORDER — METOPROLOL TARTRATE 50 MG
1 TABLET ORAL
Qty: 0 | Refills: 0 | COMMUNITY
Start: 2017-01-30

## 2017-01-30 RX ADMIN — NYSTATIN CREAM 1 APPLICATION(S): 100000 CREAM TOPICAL at 13:31

## 2017-01-30 RX ADMIN — Medication 1000 UNIT(S): at 13:30

## 2017-01-30 RX ADMIN — DOCOSANOL 1 APPLICATION(S): 100 CREAM TOPICAL at 13:43

## 2017-01-30 RX ADMIN — Medication 50 MILLIGRAM(S): at 06:05

## 2017-01-30 RX ADMIN — Medication 8: at 13:27

## 2017-01-30 RX ADMIN — PANTOPRAZOLE SODIUM 40 MILLIGRAM(S): 20 TABLET, DELAYED RELEASE ORAL at 06:04

## 2017-01-30 RX ADMIN — Medication 15 UNIT(S): at 08:50

## 2017-01-30 RX ADMIN — CLOPIDOGREL BISULFATE 75 MILLIGRAM(S): 75 TABLET, FILM COATED ORAL at 13:30

## 2017-01-30 RX ADMIN — Medication 60 MILLIGRAM(S): at 06:04

## 2017-01-30 RX ADMIN — Medication 650 MILLIGRAM(S): at 01:28

## 2017-01-30 RX ADMIN — Medication 81 MILLIGRAM(S): at 13:30

## 2017-01-30 RX ADMIN — LOSARTAN POTASSIUM 50 MILLIGRAM(S): 100 TABLET, FILM COATED ORAL at 06:05

## 2017-01-30 RX ADMIN — Medication 2: at 08:50

## 2017-01-30 RX ADMIN — Medication 145 MILLIGRAM(S): at 13:30

## 2017-01-30 RX ADMIN — Medication 15 UNIT(S): at 13:28

## 2017-01-30 RX ADMIN — MEMANTINE HYDROCHLORIDE 10 MILLIGRAM(S): 10 TABLET ORAL at 13:30

## 2017-01-30 NOTE — DISCHARGE NOTE ADULT - CARE PLAN
Principal Discharge DX:	HTN (hypertension)  Goal:	SBP < 150/90  Instructions for follow-up, activity and diet:	HCTZ was added to your regimen. Follow-up with your PCP appointment tomorrow for further monitoring and managemt of BP. Renal ultrasound was negative. Work-up for secondary causes was negative thus far, though some labs are still pending result. Have your PCP follow-up for further results.  Secondary Diagnosis:	Headache  Goal:	keep controlled  Instructions for follow-up, activity and diet:	continue pain management with tylenol. follow-up with neurologist as outpatient for consideration of termporal artery biopsy. You did however receive some doses of steroids during the admission.  Secondary Diagnosis:	Diabetes  Goal:	Keep controlled  Instructions for follow-up, activity and diet:	continue home meds  Secondary Diagnosis:	Heart failure  Goal:	keep controlled  Instructions for follow-up, activity and diet:	continue ASA, Plavix, statin, metoprolol and rest of BP meds. follow-up with PCP and cardiology as outpatient.  Secondary Diagnosis:	Acute kidney injury  Goal:	Avoid recurrence  Instructions for follow-up, activity and diet:	stay hydrated. follow-up with pcp for re-check of electrolyte levels within 1 week. Principal Discharge DX:	HTN (hypertension)  Goal:	SBP < 150/90  Instructions for follow-up, activity and diet:	HCTZ was added to your regimen. Follow-up with your PCP appointment tomorrow for further monitoring and managemt of BP. Renal ultrasound was negative. Work-up for secondary causes was negative thus far, though some labs are still pending result. Have your PCP follow-up for further results.  Secondary Diagnosis:	Headache  Goal:	keep controlled  Instructions for follow-up, activity and diet:	continue pain management with tylenol. follow-up with neurologist as outpatient for consideration of termporal artery biopsy. You did however receive some doses of steroids during the admission. Steroids discontinued upon discharge. F/u with neurologist. Can follow Dr Briseno on wednesday.  Secondary Diagnosis:	Diabetes  Goal:	Keep controlled  Instructions for follow-up, activity and diet:	continue home meds  Secondary Diagnosis:	Heart failure  Goal:	keep controlled  Instructions for follow-up, activity and diet:	continue ASA, Plavix, statin, metoprolol and rest of BP meds. follow-up with PCP and cardiology as outpatient.  Secondary Diagnosis:	Acute kidney injury  Goal:	Avoid recurrence  Instructions for follow-up, activity and diet:	stay hydrated. follow-up with pcp for re-check of electrolyte levels within 1 week.

## 2017-01-30 NOTE — DISCHARGE NOTE ADULT - PLAN OF CARE
SBP < 150/90 HCTZ was added to your regimen. Follow-up with your PCP appointment tomorrow for further monitoring and managemt of BP. Renal ultrasound was negative. Work-up for secondary causes was negative thus far, though some labs are still pending result. Have your PCP follow-up for further results. keep controlled continue pain management with tylenol. follow-up with neurologist as outpatient for consideration of termporal artery biopsy. You did however receive some doses of steroids during the admission. Keep controlled continue home meds continue ASA, Plavix, statin, metoprolol and rest of BP meds. follow-up with PCP and cardiology as outpatient. Avoid recurrence stay hydrated. follow-up with pcp for re-check of electrolyte levels within 1 week. continue pain management with tylenol. follow-up with neurologist as outpatient for consideration of termporal artery biopsy. You did however receive some doses of steroids during the admission. Steroids discontinued upon discharge. F/u with neurologist. Can follow Dr Briseno on wednesday.

## 2017-01-30 NOTE — DISCHARGE NOTE ADULT - NS AS DC HF EDUCATION INSTRUCTIONS
Report weight gain of 2 or more pounds in one day or 3 or more pounds in one week, worsening shortness of breath, fatigue, weakness, increased swelling of hands and feet to primary care provider/Low salt diet/Activities as tolerated/Call Primary Care Provider for follow-up after discharge/Monitor Weight Daily

## 2017-01-30 NOTE — DISCHARGE NOTE ADULT - PATIENT PORTAL LINK FT
“You can access the [facility] Patient Portal, offered by [facility], by registering with the following website: [email or web address]”

## 2017-01-30 NOTE — DISCHARGE NOTE ADULT - HOSPITAL COURSE
HPI:  Patient has had persisted headaches associated with hypertension. She had a BP of 208/92, 198/97, 190/100 at 11.30 tonight. Reports severe occipital headaches, on and off since many years. She said she gets admitted, remains stable for few months then gets the same symptoms. Daughter tried to give home medications without much relief and patient was complaining of dizziness hence daughter brought her to the ED. Patient had hypertension in the previous three visit as well. In Feb, 2015 admitted for heart failure and sepsis due to pneumonia and SBP was 200s. In April, 2015 patient was admitted for CVA and SBP was 180. In Aug, 2015 patient was admitted again for CVA and BP was 210/100mmhg. Discussed with daughter as well, patient is complaint with her medications and low salt diet. No chest pain, chest pressure or chest discomfort. No palpitations or edema. No tightness, heaviness or aching about the chest, neck, axilla or epigastrium. No anorexia, nausea, vomiting, constipation or diarrhea. No abdominal pain or blood in stools.  Non smoker.     Hospital Course: 84 F PMH HTN, DM, CVA s/p TPA (Aug, 2015), PVD s/p stent in right leg, HFpEF (had HFrEF 45% but EF improved, Grade III DD) dementia, shingles and genital herpes presented with occipital headaches and hypertension.      Managed for hypertensive urgency and occipital headaches. Controlled initial SBP > 200 with IV medicatin, then transitioned to home reigmen Amlodipine and Losartan. BP persistently uncontrolled, especially at night. Moved losartan 50 to night, appeared to control it iniitally but it was still uncontrolled. Added HCTZ at night which did not help. Secondary cause work-up had been negative (only catecholamine result pending, dexamrthasone supprsesion test not conducted). Given Cr not at baseline (see below) and difficulty controlling HTN, consulted Renal (Huded) and added metoprolol to the regimen. Renal ultrasound -ve.     Consulted Neuro re management of chronic occipital headaches. Dr. Briseno was concerned for stroke as she noted dizziness in history and nystagmus on exam, obtained MRI which was negative. ESR and CRP mildly elevated, Dr. Briseno concerned for temporal arteriitis and started prednisone 60 mg QD and recommended temporal artery biopsy as outpatient. Prednisone DC'd on discharge. Patient will follow with own neurologist for consideration of the temporal artery biopsy.    Initiallly concerned for CHF exacerbation given report of pulmonary edema. Started IV diuresis and stopped as Cr increased. Gave some fluids, Cr improved, however not back to baseline. Cr on discharge 1.18. On discharge patient continued to have some mild but resolving pulm vascular congestion. -TTE: G2DD, EF 55%, mod AR. Cardio = Dr. Khan.      [FILL IN WITH ADDTIONAL INFO OF PLAN FROM [place]] HPI:  Patient has had persisted headaches associated with hypertension. She had a BP of 208/92, 198/97, 190/100 at 11.30 tonight. Reports severe occipital headaches, on and off since many years. She said she gets admitted, remains stable for few months then gets the same symptoms. Daughter tried to give home medications without much relief and patient was complaining of dizziness hence daughter brought her to the ED. Patient had hypertension in the previous three visit as well. In Feb, 2015 admitted for heart failure and sepsis due to pneumonia and SBP was 200s. In April, 2015 patient was admitted for CVA and SBP was 180. In Aug, 2015 patient was admitted again for CVA and BP was 210/100mmhg. Discussed with daughter as well, patient is complaint with her medications and low salt diet. No chest pain, chest pressure or chest discomfort. No palpitations or edema. No tightness, heaviness or aching about the chest, neck, axilla or epigastrium. No anorexia, nausea, vomiting, constipation or diarrhea. No abdominal pain or blood in stools.  Non smoker.     Hospital Course: 84 F PMH HTN, DM, CVA s/p TPA (Aug, 2015), PVD s/p stent in right leg, HFpEF (had HFrEF 45% but EF improved, Grade III DD) dementia, shingles and genital herpes presented with occipital headaches and hypertension.      Managed for hypertensive urgency and occipital headaches. Controlled initial SBP > 200 with IV medicatin, then transitioned to home reigmen Amlodipine and Losartan. BP persistently uncontrolled, especially at night. Moved losartan 50 to night, appeared to control it iniitally but it was still uncontrolled. Added HCTZ at night which did not help. Secondary cause work-up had been negative (only catecholamine result pending, dexamrthasone supprsesion test not conducted). Given Cr not at baseline (see below) and difficulty controlling HTN, consulted Renal (Huded) and added metoprolol to the regimen. Renal ultrasound -ve.     Consulted Neuro re management of chronic occipital headaches. Dr. Briseno was concerned for stroke as she noted dizziness in history and nystagmus on exam, obtained MRI which was negative. ESR and CRP mildly elevated, Dr. Briseno concerned for temporal arteriitis and started prednisone 60 mg QD and recommended temporal artery biopsy as outpatient. Prednisone DC'd on discharge. Patient will follow with own neurologist for consideration of the temporal artery biopsy.    Initiallly concerned for CHF exacerbation given report of pulmonary edema. Started IV diuresis and stopped as Cr increased. Gave some fluids, Cr improved, however not back to baseline. Cr on discharge 1.18. On discharge patient continued to have some mild but resolving pulm vascular congestion. -TTE: G2DD, EF 55%, mod AR. Cardio = Dr. Khan.    Evaluated by neurology Dr Briseno, recommended to follow up outpatient for temporal artery biopsy. F/u on wednesday. Discussed with attending.

## 2017-01-30 NOTE — DISCHARGE NOTE ADULT - MEDICATION SUMMARY - MEDICATIONS TO CHANGE
I will SWITCH the dose or number of times a day I take the medications listed below when I get home from the hospital:    insulin glargine 100 units/mL subcutaneous solution  -- 15 unit(s) subcutaneous once a day (at bedtime)    Cozaar 100 mg oral tablet  -- 1 tab(s) by mouth once a day

## 2017-01-30 NOTE — DISCHARGE NOTE ADULT - ABILITY TO HEAR (WITH HEARING AID OR HEARING APPLIANCE IF NORMALLY USED):
Mildly to Moderately Impaired: difficulty hearing in some environments or speaker may need to increase volume or speak distinctly/left side [place]

## 2017-01-30 NOTE — DISCHARGE NOTE ADULT - MEDICATION SUMMARY - MEDICATIONS TO TAKE
I will START or STAY ON the medications listed below when I get home from the hospital:    acetaminophen 325 mg oral tablet  -- 2 tab(s) by mouth every 6 hours, As needed, Severe Pain  -- Indication: For Headache    aspirin 81 mg oral tablet, chewable  -- 1 tab(s) by mouth once a day  -- Indication: For Prophylactic measure    losartan 50 mg oral tablet  -- 1 tab(s) by mouth once a day  -- Indication: For HTN (hypertension)    Lantus 100 units/mL subcutaneous solution  -- 20  subcutaneous once a day (at bedtime)  -- Indication: For DM    NovoLOG 100 units/mL subcutaneous solution  -- 15  subcutaneous 3 times a day (before meals)  -- Indication: For DM    Janumet 50 mg-1000 mg oral tablet  -- 1 tab(s) by mouth 2 times a day  -- Indication: For DM    atorvastatin 40 mg oral tablet  -- 1 tab(s) by mouth once a day (at bedtime)  -- Indication: For HLD    fenofibrate 145 mg oral tablet  -- 1 tab(s) by mouth once a day  -- Indication: For HLD    clopidogrel 75 mg oral tablet  -- 1 tab(s) by mouth once a day  -- Indication: For CAD    metoprolol tartrate 50 mg oral tablet  -- 1 tab(s) by mouth 2 times a day  -- Indication: For HTN (hypertension)    amLODIPine 10 mg oral tablet  -- 1 tab(s) by mouth once a day (at bedtime)  -- Indication: For HTN (hypertension)    nystatin 100,000 units/g topical cream  -- Apply on skin to affected area once a day  -- Indication: For Skin    Zovirax 5% topical cream  -- Apply on skin to affected area once a day  -- Indication: For Skin    hydroCHLOROthiazide 25 mg oral tablet  -- 1 tab(s) by mouth once a day  -- Indication: For HTN (hypertension)    memantine 28 mg oral capsule, extended release  -- 1 cap(s) by mouth once a day  -- Indication: For Dementia    Protonix 40 mg oral delayed release tablet  -- 1 tab(s) by mouth once a day  -- Indication: For GERD    cholecalciferol oral tablet  -- 1000 unit(s) by mouth once a day  -- Indication: For Vitamin D Deficiency

## 2017-01-30 NOTE — DISCHARGE NOTE ADULT - MEDICATION SUMMARY - MEDICATIONS TO STOP TAKING
I will STOP taking the medications listed below when I get home from the hospital:    Oysco 500  --  by mouth    escitalopram 5 mg oral tablet  -- 1 tab(s) by mouth once a day    predniSONE 5 mg oral tablet  -- 1 tab(s) by mouth once a day    Janumet  mg-1000 mg oral tablet, extended release  -- 1 tab(s) by mouth once a day (in the evening)    Thera M  --  by mouth    Vitamin D3 50,000 intl units oral capsule  -- 1 cap(s) by mouth once a month    Lantus 100 units/mL subcutaneous solution  --  subcutaneous    NovoLIN N 100 units/mL subcutaneous suspension  --  subcutaneous

## 2017-02-02 DIAGNOSIS — A60.09 HERPESVIRAL INFECTION OF OTHER UROGENITAL TRACT: ICD-10-CM

## 2017-02-02 DIAGNOSIS — E11.22 TYPE 2 DIABETES MELLITUS WITH DIABETIC CHRONIC KIDNEY DISEASE: ICD-10-CM

## 2017-02-02 DIAGNOSIS — N18.3 CHRONIC KIDNEY DISEASE, STAGE 3 (MODERATE): ICD-10-CM

## 2017-02-02 DIAGNOSIS — I50.32 CHRONIC DIASTOLIC (CONGESTIVE) HEART FAILURE: ICD-10-CM

## 2017-02-02 DIAGNOSIS — I16.0 HYPERTENSIVE URGENCY: ICD-10-CM

## 2017-02-02 DIAGNOSIS — I73.9 PERIPHERAL VASCULAR DISEASE, UNSPECIFIED: ICD-10-CM

## 2017-02-02 DIAGNOSIS — I16.1 HYPERTENSIVE EMERGENCY: ICD-10-CM

## 2017-02-02 DIAGNOSIS — D32.9 BENIGN NEOPLASM OF MENINGES, UNSPECIFIED: ICD-10-CM

## 2017-02-02 DIAGNOSIS — Z86.73 PERSONAL HISTORY OF TRANSIENT ISCHEMIC ATTACK (TIA), AND CEREBRAL INFARCTION WITHOUT RESIDUAL DEFICITS: ICD-10-CM

## 2017-02-02 DIAGNOSIS — D64.9 ANEMIA, UNSPECIFIED: ICD-10-CM

## 2017-02-02 DIAGNOSIS — N17.9 ACUTE KIDNEY FAILURE, UNSPECIFIED: ICD-10-CM

## 2017-02-02 DIAGNOSIS — F03.90 UNSPECIFIED DEMENTIA, UNSPECIFIED SEVERITY, WITHOUT BEHAVIORAL DISTURBANCE, PSYCHOTIC DISTURBANCE, MOOD DISTURBANCE, AND ANXIETY: ICD-10-CM

## 2017-02-02 DIAGNOSIS — Z95.828 PRESENCE OF OTHER VASCULAR IMPLANTS AND GRAFTS: ICD-10-CM

## 2017-02-02 DIAGNOSIS — I13.0 HYPERTENSIVE HEART AND CHRONIC KIDNEY DISEASE WITH HEART FAILURE AND STAGE 1 THROUGH STAGE 4 CHRONIC KIDNEY DISEASE, OR UNSPECIFIED CHRONIC KIDNEY DISEASE: ICD-10-CM

## 2017-02-02 DIAGNOSIS — R51 HEADACHE: ICD-10-CM

## 2017-02-02 DIAGNOSIS — I25.10 ATHEROSCLEROTIC HEART DISEASE OF NATIVE CORONARY ARTERY WITHOUT ANGINA PECTORIS: ICD-10-CM

## 2017-02-02 DIAGNOSIS — M19.90 UNSPECIFIED OSTEOARTHRITIS, UNSPECIFIED SITE: ICD-10-CM

## 2017-02-02 DIAGNOSIS — I50.33 ACUTE ON CHRONIC DIASTOLIC (CONGESTIVE) HEART FAILURE: ICD-10-CM

## 2017-02-03 DIAGNOSIS — Z86.73 PERSONAL HISTORY OF TRANSIENT ISCHEMIC ATTACK (TIA), AND CEREBRAL INFARCTION WITHOUT RESIDUAL DEFICITS: ICD-10-CM

## 2017-02-03 DIAGNOSIS — D64.9 ANEMIA, UNSPECIFIED: ICD-10-CM

## 2017-02-03 DIAGNOSIS — Z95.828 PRESENCE OF OTHER VASCULAR IMPLANTS AND GRAFTS: ICD-10-CM

## 2017-02-03 DIAGNOSIS — D32.9 BENIGN NEOPLASM OF MENINGES, UNSPECIFIED: ICD-10-CM

## 2017-02-03 DIAGNOSIS — I73.9 PERIPHERAL VASCULAR DISEASE, UNSPECIFIED: ICD-10-CM

## 2017-02-03 LAB — RENIN PLAS-CCNC: 0.28 NG/ML/HR — SIGNIFICANT CHANGE UP (ref 0.17–5.38)

## 2017-02-07 LAB — RENIN PLAS-CCNC: 0.53 NG/ML/HR — SIGNIFICANT CHANGE UP (ref 0.17–5.38)

## 2017-05-31 NOTE — PATIENT PROFILE ADULT. - PURPOSEFUL PROACTIVE ROUNDING
" [facility] Emergency Department    [address] [address]    Phone:  [phone]    Fax:  [phone]                                       Maddy Ortiz   MRN: [id number]    Department:  [facility] Emergency Department   Date of Visit:  5/31/2017           Patient Information     Date Of Birth          1984        Your diagnoses for this visit were:     Palpitations        You were seen by Júnior Olmos MD.      Follow-up Information     Follow up with Clinic, [facility]. Schedule an appointment as soon as possible for a visit in 2 days.    Why:  For follow up from you ED visit    Contact information:    [address] [facility] [address] [phone]          Discharge Instructions         * Heart Palpitations    Palpitations refers to the feeling that your heart is beating hard, fast or irregular. Some people describe it as \"pounding\" or \"skipped beats\". Palpitations may occur in persons with heart disease, but can also occur in healthy persons. Your doctor does not believe that anything dangerous is causing your symptoms at this time.  Heart-Related Causes:    Arrhythmia (a change from the heart's normal rhythm)    Disease of the heart valves  Non-Heart-Related Causes:    Certain medicines (such as asthma inhalers and decongestants)    Some herbal supplements, energy drinks and pills, and weight loss pills    Illegal stimulant drugs (such as cocaine, crank, methamphetamine, PCP)    Caffeine, alcohol and tobacco    Medical conditions such as thyroid disease, anemia, anxiety and panic disorder  Sometimes the cause cannot be found.  Home Care:  1. Avoid excess caffeine, alcohol, tobacco and any stimulant drugs.  2. Tell your doctor about any prescription or over-the-counter or herbal medicines you take.  Follow Up  with your doctor or as advised by our staff.  Get Prompt Medical Attention  if any of the following occur together with " palpitations:    Weakness, dizziness, light-headed or fainting    Chest pain or shortness of breath    Rapid heart rate (over 120 beats per minute, at rest)    Palpitations that lasts over 20 minutes    Weakness of an arm or leg or one side of the face    Difficulty with speech or vision    [id number] [employer]. [address] [place] [address]. All rights reserved. This information is not intended as a substitute for professional medical care. Always follow your healthcare professional's instructions.          24 Hour Appointment Hotline       To make an appointment at any [facility] (formerly [facility]), call [phone] ([phone]). If you don't have a family doctor or clinic, we will help you find one. [facility] clinics are conveniently located to serve the needs of you and your family.             Review of your medicines      Our records show that you are taking the medicines listed below. If these are incorrect, please call your family doctor or clinic.        Dose / Directions Last dose taken    * ALLERGEN IMMUNOTHERAPY PRESCRIPTION   Quantity:  5 mL        Cat Hair, Standardized 10,000 BAU/mL, ALK  3.0 ml Dog Hair Dander, A. P.  1:100 w/v, HS  1.0 ml Dust Mites F 30,000AU/mL, HS  0.5 ml Dust Mites P. 30,000 AU/mL, HS  0.5 ml  Diluent: HSA qs to 5ml   Refills:  PRN        * ALLERGEN IMMUNOTHERAPY PRESCRIPTION   Quantity:  5 mL        Alternaria Tenuis GLY 1:10 w/v, HS  0.5 ml Epicoccum Nigrum 1:10 w/v, HS 0.5 ml Hormodendrum Cladosporioides 1:10 w/v, HS 0.5 ml Diluent: HSA qs to 5ml   Refills:  PRN        * ALLERGEN IMMUNOTHERAPY PRESCRIPTION   Quantity:  5 mL        Estuardo, White  GLY 1:20 w/v, HS  0.5 ml Birch Mix GLY 1:20 w/v, HS  0.5 ml Elm, American GLY 1:20 w/v, HS  0.5 ml Hackberry GLY 1:20 w/v, HS 0.5 ml Hickory, Shagbark GLY 1:20 w/v, HS  0.5 ml Anita Mix GLY 1:20 w/v, HS 0.5 ml Oak Mix RVW GLY 1:20 w/v, HS 0.5 ml [place], Black GLY 1:20 w/v, HS 0.5 ml [place], Black GLY 1:20 w/v,  HS 0.5 ml Diluent: HSA qs to 5ml   Refills:  PRN        * ALLERGEN IMMUNOTHERAPY PRESCRIPTION   Quantity:  5 mL        Kochia GLY 1:20 w/v, HS 1.0 ml Nettle GLY 1:20 w/v, HS 1.0 ml Plantain, English GLY 1:20 w/v, HS 1.0 ml Ragweed Mixed 1:20 w/v ALK  0.6 ml Russian Thistle GLY 1:20 w/v, HS 1.0 ml Diluent: HSA qs to 5ml   Refills:  PRN        BENADRYL PO   Dose:  50 mg        Take 50 mg by mouth nightly as needed for allergies or sleep   Refills:  0        EPINEPHrine 0.3 MG/0.3ML injection   Commonly known as:  EPIPEN 2-ODETTE   Dose:  0.3 mg   Quantity:  0.6 mL        Inject 0.3 mLs (0.3 mg) into the muscle once as needed for anaphylaxis   Refills:  3        FLONASE 50 MCG/ACT spray   Dose:  1-2 spray   Quantity:  16 g   Generic drug:  fluticasone        Spray 1-2 sprays into both nostrils daily   Refills:  11        lamoTRIgine 200 MG tablet   Commonly known as:  LaMICtal   Dose:  200 mg        Take 200 mg by mouth every morning   Refills:  0        ziprasidone 40 MG capsule   Commonly known as:  GEODON   Dose:  160 mg   Indication:  Manic-Depression        Take 160 mg by mouth At Bedtime   Refills:  5        * Notice:  This list has 4 medication(s) that are the same as other medications prescribed for you. Read the directions carefully, and ask your doctor or other care provider to review them with you.            Procedures and tests performed during your visit     CBC with platelets, differential    Comprehensive metabolic panel    EKG 12 lead    Peripheral IV catheter      Orders Needing Specimen Collection     None      Pending Results     Date and Time Order Name Status Description    5/30/2017 0915 URINE CULTURE AEROBIC BACTERIAL In process             Pending Culture Results     Date and Time Order Name Status Description    5/30/2017 0915 URINE CULTURE AEROBIC BACTERIAL In process             Pending Results Instructions     If you had any lab results that were not finalized at the time of your Discharge,  you can call the ED Lab Result RN at [phone]. You will be contacted by this team for any positive Lab results or changes in treatment. The nurses are available 7 days a week from 10A to 6:30P.  You can leave a message 24 hours per day and they will return your call.        Test Results From Your Hospital Stay        5/31/2017  2:07 AM      Component Results     Component Value Ref Range & Units Status    WBC 15.5 (H) 4.0 - 11.0 10e9/L Final    RBC Count 4.63 3.8 - 5.2 10e12/L Final    Hemoglobin 13.7 11.7 - 15.7 g/dL Final    Hematocrit 40.5 35.0 - 47.0 % Final    MCV 88 78 - 100 fl Final    MCH 29.6 26.5 - 33.0 pg Final    MCHC 33.8 31.5 - 36.5 g/dL Final    RDW 12.0 10.0 - 15.0 % Final    Platelet Count 235 150 - 450 10e9/L Final    Diff Method Automated Method  Final    % Neutrophils 70.7 % Final    % Lymphocytes 15.4 % Final    % Monocytes 11.7 % Final    % Eosinophils 1.4 % Final    % Basophils 0.3 % Final    % Immature Granulocytes 0.5 % Final    Absolute Neutrophil 11.0 (H) 1.6 - 8.3 10e9/L Final    Absolute Lymphocytes 2.4 0.8 - 5.3 10e9/L Final    Absolute Monocytes 1.8 (H) 0.0 - 1.3 10e9/L Final    Absolute Eosinophils 0.2 0.0 - 0.7 10e9/L Final    Absolute Basophils 0.1 0.0 - 0.2 10e9/L Final    Abs Immature Granulocytes 0.1 0 - 0.4 10e9/L Final         5/31/2017  2:25 AM      Component Results     Component Value Ref Range & Units Status    Sodium 139 133 - 144 mmol/L Final    Potassium 3.4 3.4 - 5.3 mmol/L Final    Chloride 106 94 - 109 mmol/L Final    Carbon Dioxide 24 20 - 32 mmol/L Final    Anion Gap 9 3 - 14 mmol/L Final    Glucose 106 (H) 70 - 99 mg/dL Final    Urea Nitrogen 21 7 - 30 mg/dL Final    Creatinine 0.84 0.52 - 1.04 mg/dL Final    GFR Estimate 78 >60 mL/min/1.7m2 Final    Non  GFR Calc    GFR Estimate If Black >90   GFR Calc   >60 mL/min/1.7m2 Final    Calcium 8.6 8.5 - 10.1 mg/dL Final    Bilirubin Total 0.2 0.2 - 1.3 mg/dL Final    Albumin 3.4 3.4 -  "5.0 g/dL Final    Protein Total 6.0 (L) 6.8 - 8.8 g/dL Final    Alkaline Phosphatase 60 40 - 150 U/L Final    ALT 41 0 - 50 U/L Final    AST 19 0 - 45 U/L Final                Thank you for choosing [facility]       Thank you for choosing [facility] for your care. Our goal is always to provide you with excellent care. Hearing back from our patients is one way we can continue to improve our services. Please take a few minutes to complete the written survey that you may receive in the mail after you visit with us. Thank you!        [employer] Information     [employer] lets you send messages to your doctor, view your test results, renew your prescriptions, schedule appointments and more. To sign up, go to [email or web address] . Click on \"Log in\" on the left side of the screen, which will take you to the Welcome page. Then click on \"Sign up Now\" on the right side of the page.     You will be asked to enter the access code listed below, as well as some personal information. Please follow the directions to create your username and password.     Your access code is: [email or web address]  Expires: 2017 10:37 AM     Your access code will  in 90 days. If you need help or a new code, please call your [facility] clinic or [phone].        Care EveryWhere ID     This is your Care EveryWhere ID. This could be used by other organizations to access your [facility] medical records  [id number]        After Visit Summary       This is your record. Keep this with you and show to your community pharmacist(s) and doctor(s) at your next visit.                  " Patient

## 2018-06-01 ENCOUNTER — OUTPATIENT (OUTPATIENT)
Dept: OUTPATIENT SERVICES | Facility: HOSPITAL | Age: 83
LOS: 1 days | End: 2018-06-01

## 2018-06-02 ENCOUNTER — INPATIENT (INPATIENT)
Facility: HOSPITAL | Age: 83
LOS: 3 days | Discharge: HOME | DRG: 69 | End: 2018-06-06
Attending: INTERNAL MEDICINE | Admitting: INTERNAL MEDICINE
Payer: MEDICARE

## 2018-06-02 VITALS
RESPIRATION RATE: 16 BRPM | DIASTOLIC BLOOD PRESSURE: 80 MMHG | WEIGHT: 139.99 LBS | SYSTOLIC BLOOD PRESSURE: 170 MMHG | OXYGEN SATURATION: 97 % | HEART RATE: 70 BPM | TEMPERATURE: 98 F | HEIGHT: 63 IN

## 2018-06-02 LAB
ALBUMIN SERPL ELPH-MCNC: 3.9 G/DL — SIGNIFICANT CHANGE UP (ref 3.5–5)
ALP SERPL-CCNC: 29 U/L — LOW (ref 40–120)
ALT FLD-CCNC: 20 U/L DA — SIGNIFICANT CHANGE UP (ref 10–60)
ANION GAP SERPL CALC-SCNC: 7 MMOL/L — SIGNIFICANT CHANGE UP (ref 5–17)
APTT BLD: 29.4 SEC — SIGNIFICANT CHANGE UP (ref 27.5–37.4)
AST SERPL-CCNC: 17 U/L — SIGNIFICANT CHANGE UP (ref 10–40)
BASOPHILS # BLD AUTO: 0.1 K/UL — SIGNIFICANT CHANGE UP (ref 0–0.2)
BASOPHILS NFR BLD AUTO: 1.4 % — SIGNIFICANT CHANGE UP (ref 0–2)
BILIRUB SERPL-MCNC: 0.2 MG/DL — SIGNIFICANT CHANGE UP (ref 0.2–1.2)
BUN SERPL-MCNC: 29 MG/DL — HIGH (ref 7–18)
CALCIUM SERPL-MCNC: 9.7 MG/DL — SIGNIFICANT CHANGE UP (ref 8.4–10.5)
CHLORIDE SERPL-SCNC: 101 MMOL/L — SIGNIFICANT CHANGE UP (ref 96–108)
CO2 SERPL-SCNC: 27 MMOL/L — SIGNIFICANT CHANGE UP (ref 22–31)
CREAT SERPL-MCNC: 1.35 MG/DL — HIGH (ref 0.5–1.3)
EOSINOPHIL # BLD AUTO: 0.3 K/UL — SIGNIFICANT CHANGE UP (ref 0–0.5)
EOSINOPHIL NFR BLD AUTO: 5.1 % — SIGNIFICANT CHANGE UP (ref 0–6)
GLUCOSE SERPL-MCNC: 223 MG/DL — HIGH (ref 70–99)
HCT VFR BLD CALC: 36.8 % — SIGNIFICANT CHANGE UP (ref 34.5–45)
HGB BLD-MCNC: 11.9 G/DL — SIGNIFICANT CHANGE UP (ref 11.5–15.5)
INR BLD: 1.01 RATIO — SIGNIFICANT CHANGE UP (ref 0.88–1.16)
LYMPHOCYTES # BLD AUTO: 1.3 K/UL — SIGNIFICANT CHANGE UP (ref 1–3.3)
LYMPHOCYTES # BLD AUTO: 24 % — SIGNIFICANT CHANGE UP (ref 13–44)
MCHC RBC-ENTMCNC: 26.3 PG — LOW (ref 27–34)
MCHC RBC-ENTMCNC: 32.3 GM/DL — SIGNIFICANT CHANGE UP (ref 32–36)
MCV RBC AUTO: 81.4 FL — SIGNIFICANT CHANGE UP (ref 80–100)
MONOCYTES # BLD AUTO: 0.4 K/UL — SIGNIFICANT CHANGE UP (ref 0–0.9)
MONOCYTES NFR BLD AUTO: 7.8 % — SIGNIFICANT CHANGE UP (ref 2–14)
NEUTROPHILS # BLD AUTO: 3.3 K/UL — SIGNIFICANT CHANGE UP (ref 1.8–7.4)
NEUTROPHILS NFR BLD AUTO: 61.7 % — SIGNIFICANT CHANGE UP (ref 43–77)
PLATELET # BLD AUTO: 253 K/UL — SIGNIFICANT CHANGE UP (ref 150–400)
POTASSIUM SERPL-MCNC: 4.4 MMOL/L — SIGNIFICANT CHANGE UP (ref 3.5–5.3)
POTASSIUM SERPL-SCNC: 4.4 MMOL/L — SIGNIFICANT CHANGE UP (ref 3.5–5.3)
PROT SERPL-MCNC: 7.5 G/DL — SIGNIFICANT CHANGE UP (ref 6–8.3)
PROTHROM AB SERPL-ACNC: 11 SEC — SIGNIFICANT CHANGE UP (ref 9.8–12.7)
RBC # BLD: 4.52 M/UL — SIGNIFICANT CHANGE UP (ref 3.8–5.2)
RBC # FLD: 13.1 % — SIGNIFICANT CHANGE UP (ref 10.3–14.5)
SODIUM SERPL-SCNC: 135 MMOL/L — SIGNIFICANT CHANGE UP (ref 135–145)
TROPONIN I SERPL-MCNC: <0.015 NG/ML — SIGNIFICANT CHANGE UP (ref 0–0.04)
WBC # BLD: 5.3 K/UL — SIGNIFICANT CHANGE UP (ref 3.8–10.5)
WBC # FLD AUTO: 5.3 K/UL — SIGNIFICANT CHANGE UP (ref 3.8–10.5)

## 2018-06-02 PROCEDURE — 70450 CT HEAD/BRAIN W/O DYE: CPT | Mod: 26

## 2018-06-02 PROCEDURE — 71045 X-RAY EXAM CHEST 1 VIEW: CPT | Mod: 26

## 2018-06-02 PROCEDURE — 99291 CRITICAL CARE FIRST HOUR: CPT

## 2018-06-02 RX ORDER — SODIUM CHLORIDE 9 MG/ML
1000 INJECTION INTRAMUSCULAR; INTRAVENOUS; SUBCUTANEOUS
Qty: 0 | Refills: 0 | Status: DISCONTINUED | OUTPATIENT
Start: 2018-06-02 | End: 2018-06-03

## 2018-06-02 RX ADMIN — SODIUM CHLORIDE 125 MILLILITER(S): 9 INJECTION INTRAMUSCULAR; INTRAVENOUS; SUBCUTANEOUS at 23:05

## 2018-06-02 NOTE — ED PROVIDER NOTE - PMH
Diabetes    HTN (hypertension)    Myositis  on prednisone x 5 years  Osteoarthritis    TIA (transient ischemic attack)

## 2018-06-02 NOTE — ED PROVIDER NOTE - OBJECTIVE STATEMENT
Pt is an 86 y/o F, with PMHx of DM, HTN, and TIA BIBMES from home with confusion x2 hours. Pt notes that she had a family gathering that she attended where she was at her baseline but after the left, she began feeling confused. Pt reports during confusion, she felt unsteady on her feet. Reports nausea and HA this AM that has been intermittent throughout the day. Denies fever, cough, and vomiting. NO trauma.  Recoreds reviewed: Pt with stroke like sxs 3-4 yrs ago with sxs attributed to hyponatremia.

## 2018-06-02 NOTE — ED ADULT NURSE NOTE - ED STAT RN HANDOFF DETAILS
Received patient from LUDIN Fatima admit to telemetry with NSR in no acute distress at present time. No bed assigned as yet continue to monitor patient.

## 2018-06-02 NOTE — ED PROVIDER NOTE - PROGRESS NOTE DETAILS
Pt initial NIHSS 2 for ataxia/confusion. Dysphagia pass Pt seen and eval along with [facility] stroke team. Daughter now states pt back to baseline. Given improvement of symptoms stroke team do not feel pt tpa case. Will eval pt for TIA given CVA history.

## 2018-06-02 NOTE — ED PROVIDER NOTE - TEMPLATE
Subjective





- Date & Time of Evaluation


Date of Evaluation: 04/02/17


Time of Evaluation: 09:45





- Subjective


Subjective: 


76 yo female patient is 2 days s/p Left foot TMA by Dr. Serna. The patient 

has PMHx of Anemia, arthritis, Atrial Fib, CAD, CHF, DM, HTN, HLD, ESRD (

dialysis on Tues/Thurs/Sat), Hyperthyroidism, Hypothyroidism. She was resting 

comfortably in bed this morning without any acute distress. AAO x3. Dressing to 

Left foot remains c/d/i. Patient complains of mild pain to Left foot upon 

palpation. Patient denies F/C/N/V/SOB today





Objective





- Vital Signs/Intake and Output


Vital Signs (last 24 hours): 


 











Temp Pulse Resp BP Pulse Ox


 


 98.5 F   69   18   136/66   93 L


 


 04/02/17 07:59  04/02/17 07:59  04/02/17 07:59  04/02/17 09:09  04/02/17 07:59











- Medications


Medications: 


 Current Medications





Acetaminophen (Tylenol 325mg Tab)  650 mg PO Q4 PRN


   PRN Reason: Pain, Mild (1-3)


Amlodipine Besylate (Norvasc)  10 mg PO DAILY [facility]


   Last Admin: 04/02/17 09:09 Dose:  10 mg


Atorvastatin Calcium (Lipitor)  80 mg PO HS [facility]


   Last Admin: 04/01/17 22:36 Dose:  80 mg


Epoetin Edgardo (Procrit)  10,000 unit IV TTS [facility]


   Last Admin: 04/01/17 20:47 Dose:  10,000 unit


Hydromorphone HCl (Dilaudid)  1 mg IVP Q4 PRN


   PRN Reason: Pain, severe (8-10)


   Last Admin: 04/02/17 03:06 Dose:  1 mg


Vancomycin HCl 750 mg/ Sodium (Chloride)  250 mls @ 166.667 mls/hr IVPB TTS [facility]


   Last Admin: 04/01/17 09:49 Dose:  166.667 mls/hr


Piperacillin Sod/Tazobactam (Sod 2.25 gm/ Sodium Chloride)  100 mls @ 100 mls/

hr IVPB Q8 [facility]


   Last Admin: 04/02/17 09:07 Dose:  100 mls/hr


Insulin Human Lispro (Humalog)  0 units SC ACHS [facility]


   PRN Reason: Protocol


   Last Admin: 04/02/17 09:08 Dose:  2 units


Levothyroxine Sodium (Synthroid)  75 mcg PO DAILY@0630 [facility]


   Last Admin: 04/02/17 08:00 Dose:  75 mcg


Metoprolol Tartrate (Lopressor)  50 mg PO BID [facility]


   Last Admin: 04/02/17 09:09 Dose:  50 mg


Oxycodone/Acetaminophen (Percocet 5/325 Mg Tab)  1 tab PO Q4 PRN


   PRN Reason: Pain, moderate (4-7)


   Stop: 04/03/17 15:19


Pantoprazole Sodium (Protonix Ec Tab)  40 mg PO DAILY [facility]


   Last Admin: 04/02/17 09:09 Dose:  40 mg


Pregabalin (Lyrica)  50 mg PO QPM [facility]


   Last Admin: 04/01/17 22:35 Dose:  50 mg


Sevelamer HCl (Renagel)  800 mg PO BID [facility]


   Last Admin: 04/02/17 09:08 Dose:  800 mg


Valsartan (Diovan)  160 mg PO DAILY [facility]


   Last Admin: 04/02/17 09:08 Dose:  160 mg











- Labs


Labs: 


 





 03/31/17 05:30 





 03/31/17 05:30 





 











PT  12.3 SECONDS (9.6-11.2)  H  03/31/17  05:30    


 


INR  1.18  (0.92-1.08)  H  03/31/17  05:30    


 


APTT  25.9 SECONDS (23.3-32.5)   03/31/17  05:30    














- Constitutional


Appears: Well, Non-toxic, No Acute Distress





- Extremities Exam


Additional comments: 


Left lower extremity exam





DERM: 


Surgical site well coapted with all sutures intact. No dehiscence or maceration 

is noted. No drainage is noted. No erythema is noted. No acute sign of 

infection is noted.





VASC: Palpable DP noted 2/4. Non-palpable PT. Blenchable digits, CRT less than 

3 seconds to all digits bilaterally noted.





ORTHO: Pain on palpation to right hallux, distal to MPJ





NEURO: Gross sensation diminished








- Neurological Exam


Neurological Exam: Alert, Awake, Oriented x3





- Psychiatric Exam


Psychiatric exam: Normal Affect, Normal Mood





- Skin


Skin Exam: Normal Color, Warm





Assessment and Plan





- Assessment and Plan (Free Text)


Assessment: 


77 year old female patient 2 days s/p Left foot TMA by Dr. Serna


Plan: 


Patient was seen, evaluated and treated at bedside


labs and vitals reviewed


discussed with Dr. Serna


Left foot dressed with Adaptic, DSD


Continue IV Abx


Patient to be non-weight bearing to Left foot


Podiatry will follow in-house Neuro

## 2018-06-02 NOTE — ED ADULT TRIAGE NOTE - CHIEF COMPLAINT QUOTE
BIBA for altered mental status, as per pt's daughter pt is a&ox3 at baseline but was forgetful all day, pt presently oriented to self and place, calm and cooperative

## 2018-06-03 DIAGNOSIS — I10 ESSENTIAL (PRIMARY) HYPERTENSION: ICD-10-CM

## 2018-06-03 DIAGNOSIS — G45.9 TRANSIENT CEREBRAL ISCHEMIC ATTACK, UNSPECIFIED: ICD-10-CM

## 2018-06-03 DIAGNOSIS — G30.1 ALZHEIMER'S DISEASE WITH LATE ONSET: ICD-10-CM

## 2018-06-03 DIAGNOSIS — N18.3 CHRONIC KIDNEY DISEASE, STAGE 3 (MODERATE): ICD-10-CM

## 2018-06-03 DIAGNOSIS — F03.90 UNSPECIFIED DEMENTIA WITHOUT BEHAVIORAL DISTURBANCE: ICD-10-CM

## 2018-06-03 DIAGNOSIS — I12.9 HYPERTENSIVE CHRONIC KIDNEY DISEASE WITH STAGE 1 THROUGH STAGE 4 CHRONIC KIDNEY DISEASE, OR UNSPECIFIED CHRONIC KIDNEY DISEASE: ICD-10-CM

## 2018-06-03 DIAGNOSIS — E11.9 TYPE 2 DIABETES MELLITUS WITHOUT COMPLICATIONS: ICD-10-CM

## 2018-06-03 DIAGNOSIS — Z29.9 ENCOUNTER FOR PROPHYLACTIC MEASURES, UNSPECIFIED: ICD-10-CM

## 2018-06-03 DIAGNOSIS — N17.9 ACUTE KIDNEY FAILURE, UNSPECIFIED: ICD-10-CM

## 2018-06-03 DIAGNOSIS — R41.82 ALTERED MENTAL STATUS, UNSPECIFIED: ICD-10-CM

## 2018-06-03 DIAGNOSIS — M19.90 UNSPECIFIED OSTEOARTHRITIS, UNSPECIFIED SITE: ICD-10-CM

## 2018-06-03 DIAGNOSIS — E78.5 HYPERLIPIDEMIA, UNSPECIFIED: ICD-10-CM

## 2018-06-03 LAB
ALBUMIN SERPL ELPH-MCNC: 3.5 G/DL — SIGNIFICANT CHANGE UP (ref 3.5–5)
ALP SERPL-CCNC: 27 U/L — LOW (ref 40–120)
ALT FLD-CCNC: 17 U/L DA — SIGNIFICANT CHANGE UP (ref 10–60)
AMMONIA BLD-MCNC: 41 UMOL/L — HIGH (ref 11–32)
ANION GAP SERPL CALC-SCNC: 8 MMOL/L — SIGNIFICANT CHANGE UP (ref 5–17)
APPEARANCE UR: CLEAR — SIGNIFICANT CHANGE UP
AST SERPL-CCNC: 14 U/L — SIGNIFICANT CHANGE UP (ref 10–40)
BASOPHILS # BLD AUTO: 0.1 K/UL — SIGNIFICANT CHANGE UP (ref 0–0.2)
BASOPHILS NFR BLD AUTO: 0.9 % — SIGNIFICANT CHANGE UP (ref 0–2)
BILIRUB SERPL-MCNC: 0.2 MG/DL — SIGNIFICANT CHANGE UP (ref 0.2–1.2)
BILIRUB UR-MCNC: NEGATIVE — SIGNIFICANT CHANGE UP
BUN SERPL-MCNC: 25 MG/DL — HIGH (ref 7–18)
CALCIUM SERPL-MCNC: 9.2 MG/DL — SIGNIFICANT CHANGE UP (ref 8.4–10.5)
CHLORIDE SERPL-SCNC: 103 MMOL/L — SIGNIFICANT CHANGE UP (ref 96–108)
CHOLEST SERPL-MCNC: 157 MG/DL — SIGNIFICANT CHANGE UP (ref 10–199)
CO2 SERPL-SCNC: 25 MMOL/L — SIGNIFICANT CHANGE UP (ref 22–31)
COLOR SPEC: YELLOW — SIGNIFICANT CHANGE UP
CREAT SERPL-MCNC: 1.14 MG/DL — SIGNIFICANT CHANGE UP (ref 0.5–1.3)
DIFF PNL FLD: NEGATIVE — SIGNIFICANT CHANGE UP
EOSINOPHIL # BLD AUTO: 0.2 K/UL — SIGNIFICANT CHANGE UP (ref 0–0.5)
EOSINOPHIL NFR BLD AUTO: 2.6 % — SIGNIFICANT CHANGE UP (ref 0–6)
FOLATE SERPL-MCNC: 7.7 NG/ML — SIGNIFICANT CHANGE UP
GLUCOSE SERPL-MCNC: 228 MG/DL — HIGH (ref 70–99)
GLUCOSE UR QL: 100 MG/DL
HBA1C BLD-MCNC: 8.3 % — HIGH (ref 4–5.6)
HCT VFR BLD CALC: 36.3 % — SIGNIFICANT CHANGE UP (ref 34.5–45)
HDLC SERPL-MCNC: 52 MG/DL — SIGNIFICANT CHANGE UP (ref 40–125)
HGB BLD-MCNC: 11.3 G/DL — LOW (ref 11.5–15.5)
INR BLD: 1.04 RATIO — SIGNIFICANT CHANGE UP (ref 0.88–1.16)
KETONES UR-MCNC: NEGATIVE — SIGNIFICANT CHANGE UP
LEUKOCYTE ESTERASE UR-ACNC: ABNORMAL
LIPID PNL WITH DIRECT LDL SERPL: 87 MG/DL — SIGNIFICANT CHANGE UP
LYMPHOCYTES # BLD AUTO: 1.1 K/UL — SIGNIFICANT CHANGE UP (ref 1–3.3)
LYMPHOCYTES # BLD AUTO: 15.5 % — SIGNIFICANT CHANGE UP (ref 13–44)
MAGNESIUM SERPL-MCNC: 1.9 MG/DL — SIGNIFICANT CHANGE UP (ref 1.6–2.6)
MCHC RBC-ENTMCNC: 25.6 PG — LOW (ref 27–34)
MCHC RBC-ENTMCNC: 31.1 GM/DL — LOW (ref 32–36)
MCV RBC AUTO: 82.2 FL — SIGNIFICANT CHANGE UP (ref 80–100)
MONOCYTES # BLD AUTO: 0.4 K/UL — SIGNIFICANT CHANGE UP (ref 0–0.9)
MONOCYTES NFR BLD AUTO: 5.2 % — SIGNIFICANT CHANGE UP (ref 2–14)
NEUTROPHILS # BLD AUTO: 5.3 K/UL — SIGNIFICANT CHANGE UP (ref 1.8–7.4)
NEUTROPHILS NFR BLD AUTO: 75.8 % — SIGNIFICANT CHANGE UP (ref 43–77)
NITRITE UR-MCNC: NEGATIVE — SIGNIFICANT CHANGE UP
OSMOLALITY UR: 253 MOS/KG — SIGNIFICANT CHANGE UP (ref 50–1200)
PH UR: 7 — SIGNIFICANT CHANGE UP (ref 5–8)
PHOSPHATE SERPL-MCNC: 2.6 MG/DL — SIGNIFICANT CHANGE UP (ref 2.5–4.5)
PLATELET # BLD AUTO: 248 K/UL — SIGNIFICANT CHANGE UP (ref 150–400)
POTASSIUM SERPL-MCNC: 4.2 MMOL/L — SIGNIFICANT CHANGE UP (ref 3.5–5.3)
POTASSIUM SERPL-SCNC: 4.2 MMOL/L — SIGNIFICANT CHANGE UP (ref 3.5–5.3)
PROT ?TM UR-MCNC: 22 MG/DL — HIGH (ref 0–12)
PROT SERPL-MCNC: 6.9 G/DL — SIGNIFICANT CHANGE UP (ref 6–8.3)
PROT UR-MCNC: 30 MG/DL
PROTHROM AB SERPL-ACNC: 11.3 SEC — SIGNIFICANT CHANGE UP (ref 9.8–12.7)
RBC # BLD: 4.41 M/UL — SIGNIFICANT CHANGE UP (ref 3.8–5.2)
RBC # FLD: 12.7 % — SIGNIFICANT CHANGE UP (ref 10.3–14.5)
SODIUM SERPL-SCNC: 136 MMOL/L — SIGNIFICANT CHANGE UP (ref 135–145)
SODIUM UR-SCNC: 68 MMOL/L — SIGNIFICANT CHANGE UP (ref 40–220)
SP GR SPEC: 1.01 — SIGNIFICANT CHANGE UP (ref 1.01–1.02)
TOTAL CHOLESTEROL/HDL RATIO MEASUREMENT: 3 RATIO — LOW (ref 3.3–7.1)
TRIGL SERPL-MCNC: 90 MG/DL — SIGNIFICANT CHANGE UP (ref 10–149)
TSH SERPL-MCNC: 2.59 UU/ML — SIGNIFICANT CHANGE UP (ref 0.34–4.82)
TSH SERPL-MCNC: 2.61 UU/ML — SIGNIFICANT CHANGE UP (ref 0.34–4.82)
UROBILINOGEN FLD QL: NEGATIVE — SIGNIFICANT CHANGE UP
VIT B12 SERPL-MCNC: 306 PG/ML — SIGNIFICANT CHANGE UP (ref 232–1245)
WBC # BLD: 7 K/UL — SIGNIFICANT CHANGE UP (ref 3.8–10.5)
WBC # FLD AUTO: 7 K/UL — SIGNIFICANT CHANGE UP (ref 3.8–10.5)

## 2018-06-03 PROCEDURE — 99223 1ST HOSP IP/OBS HIGH 75: CPT

## 2018-06-03 RX ORDER — ESCITALOPRAM OXALATE 10 MG/1
5 TABLET, FILM COATED ORAL DAILY
Qty: 0 | Refills: 0 | Status: DISCONTINUED | OUTPATIENT
Start: 2018-06-03 | End: 2018-06-06

## 2018-06-03 RX ORDER — ASPIRIN/CALCIUM CARB/MAGNESIUM 324 MG
81 TABLET ORAL DAILY
Qty: 0 | Refills: 0 | Status: DISCONTINUED | OUTPATIENT
Start: 2018-06-03 | End: 2018-06-06

## 2018-06-03 RX ORDER — ACYCLOVIR 50 MG/G
1 OINTMENT TOPICAL
Qty: 0 | Refills: 0 | COMMUNITY

## 2018-06-03 RX ORDER — PANTOPRAZOLE SODIUM 20 MG/1
1 TABLET, DELAYED RELEASE ORAL
Qty: 0 | Refills: 0 | COMMUNITY

## 2018-06-03 RX ORDER — ATORVASTATIN CALCIUM 80 MG/1
40 TABLET, FILM COATED ORAL AT BEDTIME
Qty: 0 | Refills: 0 | Status: DISCONTINUED | OUTPATIENT
Start: 2018-06-03 | End: 2018-06-06

## 2018-06-03 RX ORDER — DOCUSATE SODIUM 100 MG
100 CAPSULE ORAL
Qty: 0 | Refills: 0 | Status: DISCONTINUED | OUTPATIENT
Start: 2018-06-03 | End: 2018-06-06

## 2018-06-03 RX ORDER — FERROUS SULFATE 325(65) MG
325 TABLET ORAL DAILY
Qty: 0 | Refills: 0 | Status: DISCONTINUED | OUTPATIENT
Start: 2018-06-03 | End: 2018-06-06

## 2018-06-03 RX ORDER — METOPROLOL TARTRATE 50 MG
50 TABLET ORAL DAILY
Qty: 0 | Refills: 0 | Status: DISCONTINUED | OUTPATIENT
Start: 2018-06-03 | End: 2018-06-06

## 2018-06-03 RX ORDER — NYSTATIN CREAM 100000 [USP'U]/G
1 CREAM TOPICAL
Qty: 0 | Refills: 0 | COMMUNITY

## 2018-06-03 RX ORDER — LOSARTAN POTASSIUM 100 MG/1
50 TABLET, FILM COATED ORAL ONCE
Qty: 0 | Refills: 0 | Status: COMPLETED | OUTPATIENT
Start: 2018-06-03 | End: 2018-06-03

## 2018-06-03 RX ORDER — HEPARIN SODIUM 5000 [USP'U]/ML
5000 INJECTION INTRAVENOUS; SUBCUTANEOUS EVERY 8 HOURS
Qty: 0 | Refills: 0 | Status: DISCONTINUED | OUTPATIENT
Start: 2018-06-03 | End: 2018-06-06

## 2018-06-03 RX ORDER — AMLODIPINE BESYLATE 2.5 MG/1
10 TABLET ORAL DAILY
Qty: 0 | Refills: 0 | Status: DISCONTINUED | OUTPATIENT
Start: 2018-06-03 | End: 2018-06-06

## 2018-06-03 RX ORDER — GABAPENTIN 400 MG/1
100 CAPSULE ORAL THREE TIMES A DAY
Qty: 0 | Refills: 0 | Status: DISCONTINUED | OUTPATIENT
Start: 2018-06-03 | End: 2018-06-06

## 2018-06-03 RX ORDER — ACETAMINOPHEN 500 MG
650 TABLET ORAL EVERY 6 HOURS
Qty: 0 | Refills: 0 | Status: DISCONTINUED | OUTPATIENT
Start: 2018-06-03 | End: 2018-06-06

## 2018-06-03 RX ORDER — FENOFIBRATE,MICRONIZED 130 MG
145 CAPSULE ORAL DAILY
Qty: 0 | Refills: 0 | Status: DISCONTINUED | OUTPATIENT
Start: 2018-06-03 | End: 2018-06-05

## 2018-06-03 RX ORDER — MEMANTINE HYDROCHLORIDE 10 MG/1
1 TABLET ORAL
Qty: 0 | Refills: 0 | COMMUNITY

## 2018-06-03 RX ORDER — INSULIN GLARGINE 100 [IU]/ML
20 INJECTION, SOLUTION SUBCUTANEOUS
Qty: 0 | Refills: 0 | COMMUNITY

## 2018-06-03 RX ORDER — INSULIN ASPART 100 [IU]/ML
15 INJECTION, SOLUTION SUBCUTANEOUS
Qty: 0 | Refills: 0 | COMMUNITY

## 2018-06-03 RX ORDER — CLOPIDOGREL BISULFATE 75 MG/1
75 TABLET, FILM COATED ORAL DAILY
Qty: 0 | Refills: 0 | Status: DISCONTINUED | OUTPATIENT
Start: 2018-06-03 | End: 2018-06-06

## 2018-06-03 RX ADMIN — HEPARIN SODIUM 5000 UNIT(S): 5000 INJECTION INTRAVENOUS; SUBCUTANEOUS at 13:33

## 2018-06-03 RX ADMIN — ATORVASTATIN CALCIUM 40 MILLIGRAM(S): 80 TABLET, FILM COATED ORAL at 22:38

## 2018-06-03 RX ADMIN — Medication 81 MILLIGRAM(S): at 13:33

## 2018-06-03 RX ADMIN — GABAPENTIN 100 MILLIGRAM(S): 400 CAPSULE ORAL at 22:37

## 2018-06-03 RX ADMIN — GABAPENTIN 100 MILLIGRAM(S): 400 CAPSULE ORAL at 13:31

## 2018-06-03 RX ADMIN — Medication 1 TABLET(S): at 13:32

## 2018-06-03 RX ADMIN — Medication 145 MILLIGRAM(S): at 13:32

## 2018-06-03 RX ADMIN — LOSARTAN POTASSIUM 50 MILLIGRAM(S): 100 TABLET, FILM COATED ORAL at 05:13

## 2018-06-03 RX ADMIN — HEPARIN SODIUM 5000 UNIT(S): 5000 INJECTION INTRAVENOUS; SUBCUTANEOUS at 05:36

## 2018-06-03 RX ADMIN — Medication 100 MILLIGRAM(S): at 18:26

## 2018-06-03 RX ADMIN — CLOPIDOGREL BISULFATE 75 MILLIGRAM(S): 75 TABLET, FILM COATED ORAL at 13:31

## 2018-06-03 RX ADMIN — HEPARIN SODIUM 5000 UNIT(S): 5000 INJECTION INTRAVENOUS; SUBCUTANEOUS at 22:37

## 2018-06-03 RX ADMIN — Medication 325 MILLIGRAM(S): at 13:31

## 2018-06-03 RX ADMIN — GABAPENTIN 100 MILLIGRAM(S): 400 CAPSULE ORAL at 05:36

## 2018-06-03 RX ADMIN — AMLODIPINE BESYLATE 10 MILLIGRAM(S): 2.5 TABLET ORAL at 05:35

## 2018-06-03 RX ADMIN — ESCITALOPRAM OXALATE 5 MILLIGRAM(S): 10 TABLET, FILM COATED ORAL at 13:34

## 2018-06-03 RX ADMIN — Medication 100 MILLIGRAM(S): at 05:35

## 2018-06-03 RX ADMIN — Medication 50 MILLIGRAM(S): at 05:36

## 2018-06-03 NOTE — CONSULT NOTE ADULT - PROBLEM SELECTOR RECOMMENDATION 2
Baseline SCr 0.9-1.2. Renal function at baseline.   Defer secondary w/u for CKD at this time.   Monitor BMP

## 2018-06-03 NOTE — PROGRESS NOTE ADULT - SUBJECTIVE AND OBJECTIVE BOX
Patient seen and examined at bedside  No acute events noted overnight  Case discussed with medical team    HPI:  Patient is a 85F from home, AAOx2 at baseline, ambulates with cane and walker, lives with daughter, with PMH NIDDM, HTN, ckd stage 3, dementia, b/l femoral stent and 1 cardiac stent placed 8 years ago, iron deficiency anemia, presenting with episode of confusion at 9pm yesterday while at a family gathering. As per daughter, when family members left, patient asked when she is able to go home, but was already home. Also, earlier in the day, daughter took patient's BP which was sbp 210, so was given her medications a/w generalized weakness, sob on exertion, malaise. Daughter denies that patient had slurred speech, facial droop, and at baseline does not know the date or year, but that this episode was new for the patient. Denies fever, chills, palpitations, chest pain, nausea, vomiting, diarrhea or constipation. Patient was brought to the ED at 10:48pm, code stroke was called, however, as per daughter, patient was back to her baseline and NIHSS score was 0. Patient was seen and examined, ROS only positive for fatigue otherwise negative. Reports feeling well.    PMH: as per HPI  Surgical Hx: 2 fem stents and 1 cardiac stent  Fam Hx: none  Social Hx: neg for smoking or etoh  Allergies: NKDA (2018 01:57)      PAST MEDICAL & SURGICAL HISTORY:  TIA (transient ischemic attack)  Myositis: on prednisone x 5 years  Osteoarthritis  HTN (hypertension)  Diabetes  No significant past surgical history      No Known Allergies       MEDICATIONS  (STANDING):  amLODIPine   Tablet 10 milliGRAM(s) Oral daily  aspirin  chewable 81 milliGRAM(s) Oral daily  atorvastatin 40 milliGRAM(s) Oral at bedtime  calcium carbonate 1250 mG + Vitamin D (OsCal 500 + D) 1 Tablet(s) Oral daily  clopidogrel Tablet 75 milliGRAM(s) Oral daily  docusate sodium 100 milliGRAM(s) Oral two times a day  escitalopram 5 milliGRAM(s) Oral daily  fenofibrate Tablet 145 milliGRAM(s) Oral daily  ferrous    sulfate 325 milliGRAM(s) Oral daily  gabapentin 100 milliGRAM(s) Oral three times a day  heparin  Injectable 5000 Unit(s) SubCutaneous every 8 hours  metoprolol tartrate 50 milliGRAM(s) Oral daily    MEDICATIONS  (PRN):  acetaminophen   Tablet 650 milliGRAM(s) Oral every 6 hours PRN mild pain (1-3) or fever (>100.4)      REVIEW OF SYSTEMS: as above and...  CONSTITUTIONAL: (+) malaise.   EYES: No acute change in vision   ENT:  No tinnitus  NECK: No stiffness  RESPIRATORY: No hemoptysis  CARDIOVASCULAR: No palpitations, syncope  GASTROINTESTINAL: No hematemesis, diarrhea, melena, or hematochezia.  GENITOURINARY: No hematuria  NEUROLOGICAL: No headaches  LYMPH Nodes: No enlarged glands  ENDOCRINE: No heat or cold intolerance	    T(C): 36.4 (18 @ 07:49), Max: 38 (18 @ 22:34)  HR: 66 (18 @ 07:49) (66 - 71)  BP: 130/55 (18 @ 07:49) (130/55 - 174/65)  RR: 18 (18 @ 07:49) (16 - 18)  SpO2: 94% (18 @ 07:49) (94% - 99%)    PHYSICAL EXAMINATION:   Constitutional: WD, NAD  HEENT: NC, AT  Neck:  Supple  Respiratory:  Adequate airflow b/l. Not using accessory muscles of respiration.  Cardiovascular:  S1 & S2 intact, no R/G, 2+ radial pulses b/l  Gastrointestinal: Soft, NT, ND, normoactive b.s., no organomegaly/RT/rigidity  Extremities: WWP  Neurological:  Alert and awake.  No acute focal motor deficits. Crude sensation intact.     Labs and imaging reviewed    LABS:                        11.3   7.0   )-----------( 248      ( 2018 06:55 )             36.3         136  |  103  |  25<H>  ----------------------------<  228<H>  4.2   |  25  |  1.14    Ca    9.2      2018 06:55  Phos  2.6       Mg     1.9         TPro  6.9  /  Alb  3.5  /  TBili  0.2  /  DBili  x   /  AST  14  /  ALT  17  /  AlkPhos  27<L>      CARDIAC MARKERS ( 2018 22:41 )  <0.015 ng/mL / x     / x     / x     / x          PT/INR - ( 2018 06:55 )   PT: 11.3 sec;   INR: 1.04 ratio         PTT - ( 2018 22:41 )  PTT:29.4 sec  Urinalysis Basic - ( 2018 01:46 )    Color: Yellow / Appearance: Clear / S.010 / pH: x  Gluc: x / Ketone: Negative  / Bili: Negative / Urobili: Negative   Blood: x / Protein: 30 mg/dL / Nitrite: Negative   Leuk Esterase: Trace / RBC: 0-2 /HPF / WBC 3-5 /HPF   Sq Epi: x / Non Sq Epi: Few /HPF / Bacteria: Few /HPF      CAPILLARY BLOOD GLUCOSE      POCT Blood Glucose.: 240 mg/dL (2018 22:20)        LIVER FUNCTIONS - ( 2018 06:55 )  Alb: 3.5 g/dL / Pro: 6.9 g/dL / ALK PHOS: 27 U/L / ALT: 17 U/L DA / AST: 14 U/L / GGT: x               RADIOLOGY & ADDITIONAL STUDIES:

## 2018-06-03 NOTE — CONSULT NOTE ADULT - SUBJECTIVE AND OBJECTIVE BOX
Patient is a 85y old  Female who presents with a chief complaint of AMS (03 Jun 2018 01:57)      HPI:  History obtained from chart and with .  Patient has a baseline dementia, usually oriented to person and place.  Last evening she was confused briefly per daughter and asked when should would be going home when if fact she was already home.  There was no aphasia, dysarthria or weakness.  Code stroke called but NIHSS zero.  She is currently at baseline.      PAST MEDICAL & SURGICAL HISTORY:  TIA (transient ischemic attack)  Myositis: on prednisone x 5 years  Osteoarthritis  HTN (hypertension)  Diabetes  No significant past surgical history      FAMILY HISTORY:  No pertinent family history in first degree relatives        Social Hx:  Nonsmoker, no drug or alcohol use    MEDICATIONS  (STANDING):  amLODIPine   Tablet 10 milliGRAM(s) Oral daily  aspirin  chewable 81 milliGRAM(s) Oral daily  atorvastatin 40 milliGRAM(s) Oral at bedtime  calcium carbonate 1250 mG + Vitamin D (OsCal 500 + D) 1 Tablet(s) Oral daily  clopidogrel Tablet 75 milliGRAM(s) Oral daily  docusate sodium 100 milliGRAM(s) Oral two times a day  escitalopram 5 milliGRAM(s) Oral daily  fenofibrate Tablet 145 milliGRAM(s) Oral daily  ferrous    sulfate 325 milliGRAM(s) Oral daily  gabapentin 100 milliGRAM(s) Oral three times a day  heparin  Injectable 5000 Unit(s) SubCutaneous every 8 hours  metoprolol tartrate 50 milliGRAM(s) Oral daily       Allergies    No Known Allergies    Intolerances        ROS: Pertinent positives in HPI, all other ROS were reviewed and are negative.      Vital Signs Last 24 Hrs  T(C): 36.4 (03 Jun 2018 07:49), Max: 38 (02 Jun 2018 22:34)  T(F): 97.5 (03 Jun 2018 07:49), Max: 100.4 (02 Jun 2018 22:34)  HR: 66 (03 Jun 2018 07:49) (66 - 71)  BP: 130/55 (03 Jun 2018 07:49) (130/55 - 174/65)  BP(mean): --  RR: 18 (03 Jun 2018 07:49) (16 - 18)  SpO2: 94% (03 Jun 2018 07:49) (94% - 99%)        Constitutional: awake and alert.  HEENT: PERRLA, EOMI,   Neck: Supple.  Respiratory: Breath sounds are clear bilaterally  Cardiovascular: S1 and S2, regular / irregular rhythm  Gastrointestinal: soft, nontender  Extremities:  no edema  Vascular: Carotid Bruit - no  Musculoskeletal: no joint swelling/tenderness, no abnormal movements  Skin: No rashes    Neurological exam:  HF: A x O x 2, follows commands well.    CN: LUDWIN, EOMI, VFF, facial sensation normal, no NLFD, tongue midline, Palate moves equally, SCM equal bilaterally  Motor: No pronator drift, Strength 5/5 in all 4 ext, normal bulk and tone, no tremor, rigidity or bradykinesia.    Sens: Intact to light touch and vibration   Reflexes: Symmetric, but trace throughout, toes mute  Coord:  No FNFA, dysmetria, KAUSHIK intact   Gait/Balance: not tested    NIHSS: 0    Labs:                        11.3   7.0   )-----------( 248      ( 03 Jun 2018 06:55 )             36.3     06-03    136  |  103  |  25<H>  ----------------------------<  228<H>  4.2   |  25  |  1.14    Ca    9.2      03 Jun 2018 06:55  Phos  2.6     06-03  Mg     1.9     06-03    TPro  6.9  /  Alb  3.5  /  TBili  0.2  /  DBili  x   /  AST  14  /  ALT  17  /  AlkPhos  27<L>  06-03      06-03 Chol 157 LDL 87 HDL 52 Trig 90  PT/INR - ( 03 Jun 2018 06:55 )   PT: 11.3 sec;   INR: 1.04 ratio         PTT - ( 02 Jun 2018 22:41 )  PTT:29.4 sec    Radiology report:  < from: CT Brain Stroke Protocol (06.02.18 @ 22:51) >  EXAM:  CT BRAIN STROKE PROTOCOL                            PROCEDURE DATE:  06/02/2018          INTERPRETATION:  CLINICAL INFORMATION: Altered mental status. Code stroke.    TECHNIQUE: Noncontrast axial CT images of the brain were acquired from   the base of skull to vertex.    COMPARISON: MRI brain 1/26/2017.    FINDINGS: No intracranial hemorrhage is seen. No evidence for acute   territorial infarct. Mild periventricular and subcortical white matter   hypoattenuation without mass effect is noted, non-specific, but likely   related to chronic small vessel ischemic changes.    Mild involutional changes are present with proportional prominence of the   ventricles and sulci. No mass effect or midline shift is seen. Basal   cisterns are not effaced.    The visualized paranasal sinuses and tympanomastoid cavities are clear.     The right posterior parietal extra-axial calcified lesion, likely a   meningioma, unchanged.    IMPRESSION: No intracranial hemorrhage, mass effect, or evidence for   acute territorial infarct. If there is continued concern for acute stroke   an MRI of the brain is recommended if there are no contraindications.    Findings discussed with Dr. Willis by Dr. Flores on 6/2/2018 at   10:55 PM  with read back.        A/P:  Transient confusion in a patient with baseline dementia, back to baseline.  There is no concern for TIA or other organic cause.    No neuro w/u indicated.  Would return to home setting as soon as possible to avoid worsening confusion and sundowning while in hospital

## 2018-06-03 NOTE — CONSULT NOTE ADULT - SUBJECTIVE AND OBJECTIVE BOX
Time of visit:    CHIEF COMPLAINT: Patient is a 85y old  Female who presents with a chief complaint of AMS (2018 01:57)      HPI:  Patient is a 85F from home, AAOx2 at baseline, ambulates with cane and walker, lives with daughter, with PMH NIDDM, HTN, dementia, b/l femoral stent and 1 cardiac stent placed 8 years ago, iron deficiency anemia, presenting with episode of confusion at 9pm yesterday while at a family gathering. As per daughter, when family members left, patient asked when she is able to go home, but was already home. Also, earlier in the day, daughter took patient's BP which was sbp 210, so was given her medications. Daughter denies that patient had slurred speech, facial droop or weakness, and at baseline does not know the date or year, but that this episode was new for the patient. Denies fever, chills, SOB, palpitations, chest pain, nausea, vomiting, diarrhea or constipation. Patient was brought to the ED at 10:48pm, code stroke was called, however, as per daughter, patient was back to her baseline and NIHSS score was 0. Patient was seen and examined, ROS only positive for fatigue otherwise negative. Reports feeling well.    PMH: as per HPI  Surgical Hx: 2 fem stents and 1 cardiac stent  Fam Hx: none  Social Hx: neg for smoking or etoh  Allergies: NKDA (2018 01:57)   Patient seen and examined.     PAST MEDICAL & SURGICAL HISTORY:  TIA (transient ischemic attack)  Myositis: on prednisone x 5 years  Osteoarthritis  HTN (hypertension)  Diabetes  No significant past surgical history      Allergies    No Known Allergies    Intolerances        MEDICATIONS  (STANDING):  amLODIPine   Tablet 10 milliGRAM(s) Oral daily  aspirin  chewable 81 milliGRAM(s) Oral daily  atorvastatin 40 milliGRAM(s) Oral at bedtime  calcium carbonate 1250 mG + Vitamin D (OsCal 500 + D) 1 Tablet(s) Oral daily  clopidogrel Tablet 75 milliGRAM(s) Oral daily  docusate sodium 100 milliGRAM(s) Oral two times a day  escitalopram 5 milliGRAM(s) Oral daily  fenofibrate Tablet 145 milliGRAM(s) Oral daily  ferrous    sulfate 325 milliGRAM(s) Oral daily  gabapentin 100 milliGRAM(s) Oral three times a day  heparin  Injectable 5000 Unit(s) SubCutaneous every 8 hours  metoprolol tartrate 50 milliGRAM(s) Oral daily      MEDICATIONS  (PRN):  acetaminophen   Tablet 650 milliGRAM(s) Oral every 6 hours PRN mild pain (1-3) or fever (>100.4)       Medications up to date at time of exam.    FAMILY HISTORY:  No pertinent family history in first degree relatives      SOCIAL HISTORY  Smoking History: [   ] smoking/smoke exposure, [   ] former smoker  Living Condition: [   ] apartment, [   ] private house  Work History:   Travel History: denies recent travel  Illicit Substance Use: denies  Alcohol Use: denies    REVIEW OF SYSTEMS:    CONSTITUTIONAL:  denies fevers, chills, sweats, weight loss    HEENT:  denies diplopia or blurred vision, sore throat or runny nose.    CARDIOVASCULAR:  denies pressure, squeezing, tightness, or heaviness about the chest; no palpitations.    RESPIRATORY:  denies SOB, cough, HARMON, wheezing.    GASTROINTESTINAL:  denies abdominal pain, nausea, vomiting or diarrhea.    GENITOURINARY: denies dysuria, frequency or urgency.    NEUROLOGIC:  denies numbness, tingling, seizures or weakness.    PSYCHIATRIC:  denies disorder of thought or mood.    MSK: denies swelling, redness      PHYSICAL EXAMINATION:    GENERAL: The patient is a well-developed, well-nourished, in no apparent distress.     Vital Signs Last 24 Hrs  T(C): 36.4 (2018 07:49), Max: 38 (2018 22:34)  T(F): 97.5 (2018 07:49), Max: 100.4 (2018 22:34)  HR: 66 (2018 07:49) (66 - 71)  BP: 130/55 (2018 07:49) (130/55 - 174/65)  BP(mean): --  RR: 18 (2018 07:49) (16 - 18)  SpO2: 94% (2018 07:49) (94% - 99%)    HEENT: head is normocephalic and atraumatic. mucous membranes are moist.     NECK: supple, no palpable adenopathy.    LUNGS: clear to auscultation, no wheezing, rales, or rhonchi.    HEART: regular rate and rhythm II/VI LOVE @ LSB     ABDOMEN: soft, nontender, and nondistended.     EXTREMITIES: without any cyanosis, clubbing, rash, lesions or edema.    NEUROLOGIC: awake, alert, oriented.     SKIN: warm, dry, good turgor.      LABS:                        11.3   7.0   )-----------( 248      ( 2018 06:55 )             36.3     06-    136  |  103  |  25<H>  ----------------------------<  228<H>  4.2   |  25  |  1.14    Ca    9.2      2018 06:55  Phos  2.6     -  Mg     1.9     -    TPro  6.9  /  Alb  3.5  /  TBili  0.2  /  DBili  x   /  AST  14  /  ALT  17  /  AlkPhos  27<L>      PT/INR - ( 2018 06:55 )   PT: 11.3 sec;   INR: 1.04 ratio         PTT - ( 2018 22:41 )  PTT:29.4 sec  Urinalysis Basic - ( 2018 01:46 )    Color: Yellow / Appearance: Clear / S.010 / pH: x  Gluc: x / Ketone: Negative  / Bili: Negative / Urobili: Negative   Blood: x / Protein: 30 mg/dL / Nitrite: Negative   Leuk Esterase: Trace / RBC: 0-2 /HPF / WBC 3-5 /HPF   Sq Epi: x / Non Sq Epi: Few /HPF / Bacteria: Few /HPF        CARDIAC MARKERS ( 2018 22:41 )  <0.015 ng/mL / x     / x     / x     / x                    Troponin <0.015  @ 22:41  CK --  @ 22:41  CKMB --  @ 22:41  .    MICROBIOLOGY: (if applicable)    RADIOLOGY & ADDITIONAL STUDIES:  EKG:   CXR:  ECHO:  TELE:    IMPRESSION: 85y Female PAST MEDICAL & SURGICAL HISTORY:  TIA (transient ischemic attack)  Myositis: on prednisone x 5 years  Osteoarthritis  HTN (hypertension)  Diabetes  No significant past surgical history   p/w   Patient is a 85F from home, AAOx2 at baseline, ambulates with cane and walker, lives with daughter, with PMH NIDDM, HTN, dementia, b/l femoral stent and 1 cardiac stent placed 8 years ago, iron deficiency anemia, presenting with episode of confusion at 9pm yesterday while at a family gathering. As per daughter, when family members left, patient asked when she is able to go home, but was already home. Also, earlier in the day, daughter took patient's BP which was sbp 210, so was given her medications. Daughter denies that patient had slurred speech, facial droop or weakness, and at baseline does not know the date or year, but that this episode was new for the patient. Denies fever, chills, SOB, palpitations, chest pain, nausea, vomiting, diarrhea or constipation. Patient was brought to the ED at 10:48pm, code stroke was called, however, as per daughter, patient was back to her baseline and NIHSS score was 0. Patient was seen and examined, ROS only positive for fatigue otherwise negative. Reports feeling well.    RECOMMENDATION:  Blood pressure is much improved now   Would continue with present medications.  Would recommend 2D echo and consider ischemic evaluations.

## 2018-06-03 NOTE — CONSULT NOTE ADULT - ATTENDING COMMENTS
[facility] NEPHROLOGY  Aryan Turk M.D.  Efe Rodgers D.O.  Silvia Richey M.D.  Tiffany Davis, MSN, ANP-C  [phone]    [id number] [place] [address]

## 2018-06-03 NOTE — H&P ADULT - NSHPPHYSICALEXAM_GEN_ALL_CORE
INTERVAL HPI/OVERNIGHT EVENTS:  T(C): 37.1 (06-03-18 @ 00:05), Max: 38 (06-02-18 @ 22:34)  HR: 71 (06-03-18 @ 00:05) (70 - 71)  BP: 174/65 (06-03-18 @ 00:05) (170/80 - 174/65)  RR: 18 (06-03-18 @ 00:05) (16 - 18)  SpO2: 99% (06-03-18 @ 00:05) (97% - 99%)    PHYSICAL EXAM:  GENERAL: NAD, well-groomed, well-developed  HEAD:  Atraumatic, Normocephalic  EYES: EOMI, PERRLA, conjunctiva and sclera clear  ENMT: No tonsillar erythema, exudates, or enlargement; Moist mucous membranes, Good dentition, No lesions  NECK: Supple, No JVD, Normal thyroid  NERVOUS SYSTEM:  Alert & Oriented X2, Good concentration; Motor Strength 5/5 B/L upper and lower extremities; DTRs 2+ intact and symmetric  CHEST/LUNG: Clear to percussion bilaterally; No rales, rhonchi, wheezing, or rubs  HEART: Regular rate and rhythm; No murmurs, rubs, or gallops  ABDOMEN: Soft, Nontender, Nondistended; Bowel sounds present  EXTREMITIES:  2+ Peripheral Pulses, No clubbing, cyanosis, or edema  LYMPH: No lymphadenopathy noted  SKIN: No rashes or lesions

## 2018-06-03 NOTE — H&P ADULT - PROBLEM SELECTOR PLAN 3
c/w escitalopram  AAOX2, at her baseline upon admission as per daughter  f/u b12, vit D, folate level

## 2018-06-03 NOTE — H&P ADULT - HISTORY OF PRESENT ILLNESS
Patient is a 85F from home, AAOx2 at baseline, ambulates with cane and walker, lives with daughter, with PMH NIDDM, HTN, dementia, b/l femoral stent and 1 cardiac stent placed 8 years ago, iron deficiency anemia, presenting with episode of confusion at 9pm yesterday while at a family gathering. As per daughter, when family members left, patient asked when she is able to go home, but was already home. Also, earlier in the day, daughter took patient's BP which was sbp 210, so was given her medications. Daughter denies that patient had slurred speech, facial droop or weakness, and at baseline does not know the date or year, but that this episode was new for the patient. Denies fever, chills, SOB, palpitations, chest pain, nausea, vomiting, diarrhea or constipation. Patient was brought to the ED at 10:48pm, code stroke was called, however, as per daughter, patient was back to her baseline and NIHSS score was 0. Patient was seen and examined, ROS only positive for fatigue otherwise negative. Reports feeling well.    PMH: as per HPI  Surgical Hx: 2 fem stents and 1 cardiac stent  Fam Hx: none  Social Hx: neg for smoking or etoh  Allergies: NKDA Patient is a 85F from home, AAOx2 at baseline, ambulates with cane and walker, lives with daughter, with PMH NIDDM, HTN, ckd stage 3, dementia, b/l femoral stent and 1 cardiac stent placed 8 years ago, iron deficiency anemia, presenting with episode of confusion at 9pm yesterday while at a family gathering. As per daughter, when family members left, patient asked when she is able to go home, but was already home. Also, earlier in the day, daughter took patient's BP which was sbp 210, so was given her medications a/w generalized weakness, sob on exertion, malaise. Daughter denies that patient had slurred speech, facial droop, and at baseline does not know the date or year, but that this episode was new for the patient. Denies fever, chills, palpitations, chest pain, nausea, vomiting, diarrhea or constipation. Patient was brought to the ED at 10:48pm, code stroke was called, however, as per daughter, patient was back to her baseline and NIHSS score was 0. Patient was seen and examined, ROS only positive for fatigue otherwise negative. Reports feeling well.    PMH: as per HPI  Surgical Hx: 2 fem stents and 1 cardiac stent  Fam Hx: none  Social Hx: neg for smoking or etoh  Allergies: NKDA

## 2018-06-03 NOTE — H&P ADULT - NSHPLABSRESULTS_GEN_ALL_CORE
LABS:                        11.9   5.3   )-----------( 253      ( 2018 22:41 )             36.8         135  |  101  |  29<H>  ----------------------------<  223<H>  4.4   |  27  |  1.35<H>    Ca    9.7      2018 22:41    TPro  7.5  /  Alb  3.9  /  TBili  0.2  /  DBili  x   /  AST  17  /  ALT  20  /  AlkPhos  29<L>      PT/INR - ( 2018 22:41 )   PT: 11.0 sec;   INR: 1.01 ratio         PTT - ( 2018 22:41 )  PTT:29.4 sec  Urinalysis Basic - ( 2018 01:46 )    Color: Yellow / Appearance: Clear / S.010 / pH: x  Gluc: x / Ketone: Negative  / Bili: Negative / Urobili: Negative   Blood: x / Protein: 30 mg/dL / Nitrite: Negative   Leuk Esterase: Trace / RBC: x / WBC x   Sq Epi: x / Non Sq Epi: x / Bacteria: x    POCT Blood Glucose.: 240 mg/dL (2018 22:20)    < from: CT Brain Stroke Protocol (18 @ 22:51) >    IMPRESSION: No intracranial hemorrhage, mass effect, or evidence for   acute territorial infarct. If there is continued concern for acute stroke   an MRI of the brain is recommended if there are no contraindications.    < end of copied text >

## 2018-06-03 NOTE — H&P ADULT - ASSESSMENT
Patient is a 85F from home, AAOx2 at baseline, ambulates with cane and walker, lives with daughter, with PMH NIDDM, HTN, dementia, b/l femoral stent and 1 cardiac stent placed 8 years ago, iron deficiency anemia, presenting with episode of confusion at 9pm yesterday while at a family gathering. Admitted for altered mental status likely 2/2 TIA vs worsening dementia. Patient is a 85F from home, AAOx2 at baseline, ambulates with cane and walker, lives with daughter, with PMH NIDDM, HTN, ckd stage 3, dementia, b/l femoral stent and 1 cardiac stent placed 8 years ago, iron deficiency anemia, presenting with episode of confusion a/w malaise, generalized weakness, sob, and uncontrolled HTN with , with symptoms starting at 9pm yesterday while at a family gathering. Admitted for altered mental status likely 2/2 TIA vs malignant HTN vs less likely worsening dementia.

## 2018-06-03 NOTE — CONSULT NOTE ADULT - SUBJECTIVE AND OBJECTIVE BOX
[facility] [place] [facility] NEPHROLOGY- CONSULTATION NOTE    Patient is a 86yo Female with DM type 2, HTN, CKD-3, Dementia, CAD s/p stent, PVD s/p b/l femoral stent, Iron deficiency anemia, p/w AMS. Renal consulted for Elevated serum creatinine.  Pt found to have elevated BP and mild KARLY. Losartan held and BP improved with addition of Metoprolol. Code stroke was called. CT Head neg.   Limited history from patient. Pt c/o Headahces.       PAST MEDICAL & SURGICAL HISTORY:  TIA (transient ischemic attack)  Myositis: on prednisone x 5 years  Osteoarthritis  HTN (hypertension)  Diabetes  No significant past surgical history    No Known Allergies    Home Medications Reviewed  Hospital Medications:   MEDICATIONS  (STANDING):  amLODIPine   Tablet 10 milliGRAM(s) Oral daily  aspirin  chewable 81 milliGRAM(s) Oral daily  atorvastatin 40 milliGRAM(s) Oral at bedtime  calcium carbonate 1250 mG + Vitamin D (OsCal 500 + D) 1 Tablet(s) Oral daily  clopidogrel Tablet 75 milliGRAM(s) Oral daily  docusate sodium 100 milliGRAM(s) Oral two times a day  escitalopram 5 milliGRAM(s) Oral daily  fenofibrate Tablet 145 milliGRAM(s) Oral daily  ferrous    sulfate 325 milliGRAM(s) Oral daily  gabapentin 100 milliGRAM(s) Oral three times a day  heparin  Injectable 5000 Unit(s) SubCutaneous every 8 hours  metoprolol tartrate 50 milliGRAM(s) Oral daily    Surgical Hx: 2 fem stents and 1 cardiac stent  Fam Hx: none  Social Hx: neg for smoking or etoh    REVIEW OF SYSTEMS:  Gen: no changes in weight + HA  HEENT: no rhinorrhea  Neck: no sore throat  Cards: no chest pain  Resp: no dyspnea  GI: no nausea or vomiting or diarrhea  : no dysuria or hematuria  Vascular: no LE edema  Derm: no rashes  Neuro: no numbness/tingling  All other review of systems is negative unless indicated above.    VITALS:  T(F): 97.3 (18 @ 11:18), Max: 100.4 (18 @ 22:34)  HR: 60 (18 @ 11:18)  BP: 135/64 (18 @ 11:18)  RR: 18 (18 @ 11:18)  SpO2: 95% (18 @ 11:18)  Wt(kg): --    Height (cm): 160.02 ( @ 22:12)  Weight (kg): 63.5 ( @ 22:12)  BMI (kg/m2): 24.8 ( @ 22:12)  BSA (m2): 1.66 ( @ 22:12)    PHYSICAL EXAM:  Gen: NAD, calm, confused at times  HEENT: MMM  Neck: no JVD  Cards: RRR, +S1/S2, no M/G/R  Resp: CTA B/L  GI: soft, NT/ND, NABS  : no CVA tenderness  Extremities: no LE edema B/L  Derm: no rashes  Neuro: non-focal    LABS:      136  |  103  |  25<H>  ----------------------------<  228<H>  4.2   |  25  |  1.14    Ca    9.2      2018 06:55  Phos  2.6       Mg     1.9         TPro  6.9  /  Alb  3.5  /  TBili  0.2  /  DBili      /  AST  14  /  ALT  17  /  AlkPhos  27<L>      Creatinine Trend: 1.14 <--, 1.35 <--                        11.3   7.0   )-----------( 248      ( 2018 06:55 )             36.3     Urine Studies:  Urinalysis Basic - ( 2018 01:46 )    Color: Yellow / Appearance: Clear / S.010 / pH:   Gluc:  / Ketone: Negative  / Bili: Negative / Urobili: Negative   Blood:  / Protein: 30 mg/dL / Nitrite: Negative   Leuk Esterase: Trace / RBC: 0-2 /HPF / WBC 3-5 /HPF   Sq Epi:  / Non Sq Epi: Few /HPF / Bacteria: Few /HPF      Osmolality, Random Urine: 253 mos/kg ( @ 01:46)  Sodium, Random Urine: 68 mmol/L ( @ 01:46)    RADIOLOGY & ADDITIONAL STUDIES:

## 2018-06-03 NOTE — H&P ADULT - ATTENDING COMMENTS
I agree with the above information, changes made above as needed  of note, pt's mental status slightly improving with improved uncontrolled htn  no identified infectious etiology   alison on ckd stage 3a  dyspnea likely 2/2 uncontrolled htn   malignant htn Improving   ct head (-) acute pathology   f/u tte, f/u mri, f/u carotid duplex  tele  cardio, pulm, nephro, neuro on board I agree with the above information, changes made above as needed  of note, pt's mental status slightly improving with improved uncontrolled htn  no identified infectious etiology   alison on ckd stage 3a  transaminitis of unspecified etiology, new, abd u/s (-), f/u ggt and liver/biliary w/u   dyspnea likely 2/2 uncontrolled htn   malignant htn Improving   ct head (-) acute pathology   f/u tte, f/u mri, f/u carotid duplex  tele  cardio, pulm, nephro, neuro, gi on board I agree with the above information, changes made above as needed  of note, pt's mental status slightly improving with improved uncontrolled htn  no identified infectious etiology   alison on ckd stage 3a  hyperglycemia, f/u hba1c, monitor acks  dyspnea likely 2/2 uncontrolled htn   malignant htn Improving   ct head (-) acute pathology   f/u tte, f/u mri, f/u carotid duplex  tele  cardio, pulm, nephro, neuro, endo on board

## 2018-06-03 NOTE — BEHAVIORAL HEALTH ASSESSMENT NOTE - SUMMARY
This is a 84 y/o [place] speaking female ,from [facility] with PMH of Dementia,depression,HTN was admitted with TIA.  patient was interviewed,chart was reviewed,case was discussed with MD.  Patient stated: " I cant remember anything"  Unable to provide her PMH.  stated living with her daughter.  Denied using alcohol.  Patient is a/o x1, verbal,behaviorally controlled.Speech is goal directed logical.  Mood-OK.Denied s/h thought,denied a/v hallucinations.Memory and cognition-limited.I&J-limited.  Sleep and appetite-OK.

## 2018-06-03 NOTE — H&P ADULT - PROBLEM SELECTOR PLAN 2
resuming norvasc 10mg, lopressor 50mg in AM, losartan 50mg daily  monitor BP  at home, daughter stated that sbp was 210 in AM, but came down with home bp meds

## 2018-06-03 NOTE — PATIENT PROFILE ADULT. - ABILITY TO HEAR (WITH HEARING AID OR HEARING APPLIANCE IF NORMALLY USED):
Mildly to Moderately Impaired: difficulty hearing in some environments or speaker may need to increase volume or speak distinctly/left side [place]

## 2018-06-03 NOTE — H&P ADULT - PROBLEM SELECTOR PLAN 7
IMPROVE VTE Individual Risk Assessment          RISK                                                          Points  [  ] Previous VTE                                                3  [  ] Thrombophilia                                             2  [  ] Lower limb paralysis                                   2        (unable to hold up >15 seconds)    [  ] Current Cancer                                             2         (within 6 months)  [ x ] Immobilization > 24 hrs                              1  [  ] ICU/CCU stay > 24 hours                             1  [  x] Age > 60                                                         1    IMPROVE VTE Score: 2, heparin sq

## 2018-06-03 NOTE — H&P ADULT - PROBLEM SELECTOR PLAN 1
1 hour episode of confusion as per daughter from 9pm to 10pm last night  code stroke, NIHSS score 0, back to baseline mental status so no tPA given  no neuro deficit on exam  CT head neg  not holding bp meds 2/2 TIA  MRI head  carotid doppler  echo  asa, statin, heparin sq  neuro checks  passed bedside swallow, regular diet

## 2018-06-03 NOTE — CONSULT NOTE ADULT - PROBLEM SELECTOR RECOMMENDATION 9
Mild KARLY hemodynamically mediated. Losartan held and KARLY resolved. Renal function at baseline.   Avoid nephrotoxins.

## 2018-06-04 DIAGNOSIS — R73.9 HYPERGLYCEMIA, UNSPECIFIED: ICD-10-CM

## 2018-06-04 LAB
24R-OH-CALCIDIOL SERPL-MCNC: 13.9 NG/ML — LOW (ref 30–80)
24R-OH-CALCIDIOL SERPL-MCNC: 22.4 NG/ML — LOW (ref 30–80)
ALBUMIN SERPL ELPH-MCNC: 3.5 G/DL — SIGNIFICANT CHANGE UP (ref 3.5–5)
ALP SERPL-CCNC: 24 U/L — LOW (ref 40–120)
ALT FLD-CCNC: 18 U/L DA — SIGNIFICANT CHANGE UP (ref 10–60)
ANION GAP SERPL CALC-SCNC: 7 MMOL/L — SIGNIFICANT CHANGE UP (ref 5–17)
AST SERPL-CCNC: 16 U/L — SIGNIFICANT CHANGE UP (ref 10–40)
BASOPHILS # BLD AUTO: 0.1 K/UL — SIGNIFICANT CHANGE UP (ref 0–0.2)
BASOPHILS NFR BLD AUTO: 2.1 % — HIGH (ref 0–2)
BILIRUB SERPL-MCNC: 0.2 MG/DL — SIGNIFICANT CHANGE UP (ref 0.2–1.2)
BUN SERPL-MCNC: 28 MG/DL — HIGH (ref 7–18)
CALCIUM SERPL-MCNC: 9 MG/DL — SIGNIFICANT CHANGE UP (ref 8.4–10.5)
CHLORIDE SERPL-SCNC: 101 MMOL/L — SIGNIFICANT CHANGE UP (ref 96–108)
CO2 SERPL-SCNC: 28 MMOL/L — SIGNIFICANT CHANGE UP (ref 22–31)
CREAT SERPL-MCNC: 1.15 MG/DL — SIGNIFICANT CHANGE UP (ref 0.5–1.3)
EOSINOPHIL # BLD AUTO: 0.2 K/UL — SIGNIFICANT CHANGE UP (ref 0–0.5)
EOSINOPHIL NFR BLD AUTO: 4.8 % — SIGNIFICANT CHANGE UP (ref 0–6)
GLUCOSE BLDC GLUCOMTR-MCNC: 221 MG/DL — HIGH (ref 70–99)
GLUCOSE SERPL-MCNC: 298 MG/DL — HIGH (ref 70–99)
HBA1C BLD-MCNC: 8.4 % — HIGH (ref 4–5.6)
HCT VFR BLD CALC: 37.3 % — SIGNIFICANT CHANGE UP (ref 34.5–45)
HGB BLD-MCNC: 11.5 G/DL — SIGNIFICANT CHANGE UP (ref 11.5–15.5)
INR BLD: 0.99 RATIO — SIGNIFICANT CHANGE UP (ref 0.88–1.16)
LYMPHOCYTES # BLD AUTO: 1.5 K/UL — SIGNIFICANT CHANGE UP (ref 1–3.3)
LYMPHOCYTES # BLD AUTO: 29.6 % — SIGNIFICANT CHANGE UP (ref 13–44)
MAGNESIUM SERPL-MCNC: 1.9 MG/DL — SIGNIFICANT CHANGE UP (ref 1.6–2.6)
MCHC RBC-ENTMCNC: 25.5 PG — LOW (ref 27–34)
MCHC RBC-ENTMCNC: 30.8 GM/DL — LOW (ref 32–36)
MCV RBC AUTO: 82.8 FL — SIGNIFICANT CHANGE UP (ref 80–100)
MONOCYTES # BLD AUTO: 0.3 K/UL — SIGNIFICANT CHANGE UP (ref 0–0.9)
MONOCYTES NFR BLD AUTO: 6.6 % — SIGNIFICANT CHANGE UP (ref 2–14)
NEUTROPHILS # BLD AUTO: 2.8 K/UL — SIGNIFICANT CHANGE UP (ref 1.8–7.4)
NEUTROPHILS NFR BLD AUTO: 56.9 % — SIGNIFICANT CHANGE UP (ref 43–77)
PHOSPHATE SERPL-MCNC: 2.9 MG/DL — SIGNIFICANT CHANGE UP (ref 2.5–4.5)
PLATELET # BLD AUTO: 228 K/UL — SIGNIFICANT CHANGE UP (ref 150–400)
POTASSIUM SERPL-MCNC: 3.9 MMOL/L — SIGNIFICANT CHANGE UP (ref 3.5–5.3)
POTASSIUM SERPL-SCNC: 3.9 MMOL/L — SIGNIFICANT CHANGE UP (ref 3.5–5.3)
PROT SERPL-MCNC: 6.6 G/DL — SIGNIFICANT CHANGE UP (ref 6–8.3)
PROTHROM AB SERPL-ACNC: 10.8 SEC — SIGNIFICANT CHANGE UP (ref 9.8–12.7)
RBC # BLD: 4.5 M/UL — SIGNIFICANT CHANGE UP (ref 3.8–5.2)
RBC # FLD: 12.9 % — SIGNIFICANT CHANGE UP (ref 10.3–14.5)
SODIUM SERPL-SCNC: 136 MMOL/L — SIGNIFICANT CHANGE UP (ref 135–145)
WBC # BLD: 5 K/UL — SIGNIFICANT CHANGE UP (ref 3.8–10.5)
WBC # FLD AUTO: 5 K/UL — SIGNIFICANT CHANGE UP (ref 3.8–10.5)

## 2018-06-04 PROCEDURE — 99221 1ST HOSP IP/OBS SF/LOW 40: CPT

## 2018-06-04 PROCEDURE — 93880 EXTRACRANIAL BILAT STUDY: CPT | Mod: 26

## 2018-06-04 RX ORDER — INSULIN GLARGINE 100 [IU]/ML
10 INJECTION, SOLUTION SUBCUTANEOUS AT BEDTIME
Qty: 0 | Refills: 0 | Status: DISCONTINUED | OUTPATIENT
Start: 2018-06-04 | End: 2018-06-05

## 2018-06-04 RX ORDER — FAMOTIDINE 10 MG/ML
20 INJECTION INTRAVENOUS DAILY
Qty: 0 | Refills: 0 | Status: DISCONTINUED | OUTPATIENT
Start: 2018-06-04 | End: 2018-06-06

## 2018-06-04 RX ORDER — CHOLECALCIFEROL (VITAMIN D3) 125 MCG
1000 CAPSULE ORAL DAILY
Qty: 0 | Refills: 0 | Status: DISCONTINUED | OUTPATIENT
Start: 2018-06-04 | End: 2018-06-06

## 2018-06-04 RX ORDER — INSULIN LISPRO 100/ML
VIAL (ML) SUBCUTANEOUS
Qty: 0 | Refills: 0 | Status: DISCONTINUED | OUTPATIENT
Start: 2018-06-04 | End: 2018-06-06

## 2018-06-04 RX ADMIN — HEPARIN SODIUM 5000 UNIT(S): 5000 INJECTION INTRAVENOUS; SUBCUTANEOUS at 21:59

## 2018-06-04 RX ADMIN — Medication 325 MILLIGRAM(S): at 12:16

## 2018-06-04 RX ADMIN — INSULIN GLARGINE 10 UNIT(S): 100 INJECTION, SOLUTION SUBCUTANEOUS at 21:59

## 2018-06-04 RX ADMIN — HEPARIN SODIUM 5000 UNIT(S): 5000 INJECTION INTRAVENOUS; SUBCUTANEOUS at 13:26

## 2018-06-04 RX ADMIN — Medication 145 MILLIGRAM(S): at 13:25

## 2018-06-04 RX ADMIN — Medication 6: at 12:18

## 2018-06-04 RX ADMIN — HEPARIN SODIUM 5000 UNIT(S): 5000 INJECTION INTRAVENOUS; SUBCUTANEOUS at 06:35

## 2018-06-04 RX ADMIN — Medication 100 MILLIGRAM(S): at 17:14

## 2018-06-04 RX ADMIN — Medication 50 MILLIGRAM(S): at 06:35

## 2018-06-04 RX ADMIN — Medication 100 MILLIGRAM(S): at 06:35

## 2018-06-04 RX ADMIN — AMLODIPINE BESYLATE 10 MILLIGRAM(S): 2.5 TABLET ORAL at 06:35

## 2018-06-04 RX ADMIN — GABAPENTIN 100 MILLIGRAM(S): 400 CAPSULE ORAL at 13:26

## 2018-06-04 RX ADMIN — ESCITALOPRAM OXALATE 5 MILLIGRAM(S): 10 TABLET, FILM COATED ORAL at 13:25

## 2018-06-04 RX ADMIN — Medication 1 TABLET(S): at 13:25

## 2018-06-04 RX ADMIN — Medication 1000 UNIT(S): at 18:24

## 2018-06-04 RX ADMIN — Medication 81 MILLIGRAM(S): at 12:16

## 2018-06-04 RX ADMIN — GABAPENTIN 100 MILLIGRAM(S): 400 CAPSULE ORAL at 06:35

## 2018-06-04 RX ADMIN — ATORVASTATIN CALCIUM 40 MILLIGRAM(S): 80 TABLET, FILM COATED ORAL at 21:59

## 2018-06-04 RX ADMIN — Medication 650 MILLIGRAM(S): at 06:37

## 2018-06-04 RX ADMIN — CLOPIDOGREL BISULFATE 75 MILLIGRAM(S): 75 TABLET, FILM COATED ORAL at 12:16

## 2018-06-04 RX ADMIN — GABAPENTIN 100 MILLIGRAM(S): 400 CAPSULE ORAL at 21:59

## 2018-06-04 RX ADMIN — FAMOTIDINE 20 MILLIGRAM(S): 10 INJECTION INTRAVENOUS at 13:25

## 2018-06-04 NOTE — PROGRESS NOTE ADULT - SUBJECTIVE AND OBJECTIVE BOX
[facility] NEPHROLOGY- PROGRESS NOTE    Patient is a 85y Female with a/w    Hospital Medications: Medications reviewed.  REVIEW OF SYSTEMS: Limited ROS- pt c/o HA. Denies any SOB or chest pain.     VITALS:  T(F): 98.2 (18 @ 11:15), Max: 98.4 (18 @ 23:59)  HR: 60 (18 @ 11:15)  BP: 148/69 (18 @ 11:15)  RR: 16 (18 @ 11:15)  SpO2: 98% (18 @ 11:15)  Wt(kg): --  Height (cm): 160.02 ( @ 22:12)  Weight (kg): 63.5 ( @ 22:12)  BMI (kg/m2): 24.8 ( @ 22:12)  BSA (m2): 1.66 ( @ 22:12)    PHYSICAL EXAM:  Constitutional: NAD  Neurological: Awake and Alert  HEENT: anicteric sclera,   Respiratory: CTAB, no wheezes, rales or rhonchi  Cardiovascular: S1, S2, RRR  Gastrointestinal: BS+, soft, NT/ND  : No juan.  Extremities: No peripheral edema    LABS:      136  |  101  |  28<H>  ----------------------------<  298<H>  3.9   |  28  |  1.15    Ca    9.0      2018 08:43  Phos  2.9       Mg     1.9         TPro  6.6  /  Alb  3.5  /  TBili  0.2  /  DBili      /  AST  16  /  ALT  18  /  AlkPhos  24<L>      Creatinine Trend: 1.15 <--, 1.14 <--, 1.35 <--                        11.5   5.0   )-----------( 228      ( 2018 08:43 )             37.3     Urine Studies:  Urinalysis Basic - ( 2018 01:46 )    Color: Yellow / Appearance: Clear / S.010 / pH:   Gluc:  / Ketone: Negative  / Bili: Negative / Urobili: Negative   Blood:  / Protein: 30 mg/dL / Nitrite: Negative   Leuk Esterase: Trace / RBC: 0-2 /HPF / WBC 3-5 /HPF   Sq Epi:  / Non Sq Epi: Few /HPF / Bacteria: Few /HPF      Osmolality, Random Urine: 253 mos/kg ( @ 01:46)  Sodium, Random Urine: 68 mmol/L ( @ 01:46)    RADIOLOGY & ADDITIONAL STUDIES:

## 2018-06-04 NOTE — CONSULT NOTE ADULT - SUBJECTIVE AND OBJECTIVE BOX
Asked to eval abnorm carotuid US.  Pt w dementia, adm'ed w AMS.  No focal neuro deficits, amaorosis, speech def etc.  No h/o GI/LE claudication, rest pain, ulcers.    Med hist reviewed in EHR    AA, NAD, RANDOLPH's  No JVD/Icterus  Ext's: warm, no edema, no ulcers.    car US reviewed: Mild-mod L ICA dz.    A/P:   MAXIMUS  Asymp    Rec:   Med mgmt  FU neuro  fu outpt for vasc surveillance- repeat US in 6 months or earlier if new probs arise,

## 2018-06-04 NOTE — CONSULT NOTE ADULT - CONSULT REQUESTED DATE/TIME
03-Jun-2018 08:52
03-Jun-2018 09:01
03-Jun-2018 12:19
04-Jun-1800
04-Jun-2018 19:00
04-Jun-2018 08:51

## 2018-06-04 NOTE — CONSULT NOTE ADULT - ASSESSMENT
Patient is a 86yo Female with DM type 2, HTN, CKD-3, Dementia, CAD s/p stent, PVD s/p b/l femoral stent, Iron deficiency anemia, p/w AMS. Renal consulted for Elevated serum creatinine.
Patient is a 85F from home, AAOx2 at baseline, ambulates with cane and walker, lives with daughter, with PMH NIDDM, HTN, ckd stage 3, dementia, b/l femoral stent and 1 cardiac stent placed 8 years ago, iron deficiency anemia, presenting with episode of confusion a/w malaise,  patient admitted for possible TIA and ENDO  consulted FOR elevated blood sugars 200s { hba1c -8.3}

## 2018-06-04 NOTE — CONSULT NOTE ADULT - SUBJECTIVE AND OBJECTIVE BOX
CHIEF COMPLAINT: Patient is a 85y old  Female who presents with a chief complaint of AMS (2018 01:57)      HPI:  Patient is a 85F from home, AAOx2 at baseline, ambulates with cane and walker, lives with daughter, with PMH NIDDM, HTN, ckd stage 3, dementia, b/l femoral stent and 1 cardiac stent placed 8 years ago, iron deficiency anemia, presenting with episode of confusion at 9pm yesterday while at a family gathering. As per daughter, when family members left, patient asked when she is able to go home, but was already home. Also, earlier in the day, daughter took patient's BP which was sbp 210, so was given her medications a/w generalized weakness, sob on exertion, malaise. Daughter denies that patient had slurred speech, facial droop, and at baseline does not know the date or year, but that this episode was new for the patient. Denies fever, chills, palpitations, chest pain, nausea, vomiting, diarrhea or constipation. Patient was brought to the ED at 10:48pm, code stroke was called, however, as per daughter, patient was back to her baseline and NIHSS score was 0. Patient was seen and examined, ROS only positive for fatigue otherwise negative. Reports feeling well.    PMH: as per HPI  Surgical Hx: 2 fem stents and 1 cardiac stent  Fam Hx: none  Social Hx: neg for smoking or etoh  Allergies: NKDA (2018 01:57)   Patient seen and examined.     PAST MEDICAL & SURGICAL HISTORY:  TIA (transient ischemic attack)  Myositis: on prednisone x 5 years  Osteoarthritis  HTN (hypertension)  Diabetes  No significant past surgical history      Allergies    No Known Allergies    Intolerances        MEDICATIONS  (STANDING):  amLODIPine   Tablet 10 milliGRAM(s) Oral daily  aspirin  chewable 81 milliGRAM(s) Oral daily  atorvastatin 40 milliGRAM(s) Oral at bedtime  calcium carbonate 1250 mG + Vitamin D (OsCal 500 + D) 1 Tablet(s) Oral daily  cholecalciferol 1000 Unit(s) Oral daily  clopidogrel Tablet 75 milliGRAM(s) Oral daily  docusate sodium 100 milliGRAM(s) Oral two times a day  escitalopram 5 milliGRAM(s) Oral daily  famotidine    Tablet 20 milliGRAM(s) Oral daily  fenofibrate Tablet 145 milliGRAM(s) Oral daily  ferrous    sulfate 325 milliGRAM(s) Oral daily  gabapentin 100 milliGRAM(s) Oral three times a day  heparin  Injectable 5000 Unit(s) SubCutaneous every 8 hours  insulin glargine Injectable (LANTUS) 10 Unit(s) SubCutaneous at bedtime  insulin lispro (HumaLOG) corrective regimen sliding scale   SubCutaneous three times a day before meals  metoprolol tartrate 50 milliGRAM(s) Oral daily      MEDICATIONS  (PRN):  acetaminophen   Tablet 650 milliGRAM(s) Oral every 6 hours PRN mild pain (1-3) or fever (>100.4)   Medications up to date at time of exam.    FAMILY HISTORY:  No pertinent family history in first degree relatives      SOCIAL HISTORY  Smoking History: [   ] smoking/smoke exposure, [   ] former smoker, [ x ] denies smoking  Living Condition: [   ] apartment, [   ] private house  Work History:   Travel History: denies recent travel  Illicit Substance Use: denies  Alcohol Use: denies    REVIEW OF SYSTEMS:    CONSTITUTIONAL:  denies fevers, chills, sweats, weight loss    HEENT:  denies diplopia or blurred vision, sore throat or runny nose.    CARDIOVASCULAR:  denies pressure, squeezing, tightness, or heaviness about the chest; no palpitations.    RESPIRATORY:  denies SOB, cough, HARMON, wheezing.    GASTROINTESTINAL:  denies abdominal pain, nausea, vomiting or diarrhea.    GENITOURINARY: denies dysuria, frequency or urgency.    NEUROLOGIC:  denies numbness, tingling, seizures or weakness.    PSYCHIATRIC:  denies disorder of thought or mood.    MSK: denies swelling, redness      PHYSICAL EXAMINATION:    GENERAL: The patient is a well-developed, well-nourished, in no apparent distress.     Vital Signs Last 24 Hrs  T(C): 36.9 (2018 15:25), Max: 36.9 (2018 23:59)  T(F): 98.5 (2018 15:25), Max: 98.5 (2018 15:25)  HR: 68 (2018 15:25) (57 - 68)  BP: 139/57 (2018 15:25) (136/54 - 167/65)  BP(mean): --  RR: 17 (2018 15:25) (16 - 18)  SpO2: 98% (2018 15:25) (96% - 98%)   (if applicable)    Chest Tube (if applicable)    HEENT: Head is normocephalic and atraumatic. .    NECK: Supple, no palpable adenopathy.    LUNGS: Clear to auscultation, no wheezing, rales, or rhonchi.    HEART: Regular rate and rhythm without murmur.    ABDOMEN: Soft, nontender, and nondistended.  No hepatosplenomegaly is noted.    EXTREMITIES: Without any cyanosis, clubbing, rash, lesions or edema.    NEUROLOGIC: Awake, alert.    SKIN: Warm, dry, good turgor.      LABS:                        11.5   5.0   )-----------( 228      ( 2018 08:43 )             37.3     06-04    136  |  101  |  28<H>  ----------------------------<  298<H>  3.9   |  28  |  1.15    Ca    9.0      2018 08:43  Phos  2.9     06-  Mg     1.9     06-    TPro  6.6  /  Alb  3.5  /  TBili  0.2  /  DBili  x   /  AST  16  /  ALT  18  /  AlkPhos  24<L>  06-04    PT/INR - ( 2018 08:43 )   PT: 10.8 sec;   INR: 0.99 ratio         PTT - ( 2018 22:41 )  PTT:29.4 sec  Urinalysis Basic - ( 2018 01:46 )    Color: Yellow / Appearance: Clear / S.010 / pH: x  Gluc: x / Ketone: Negative  / Bili: Negative / Urobili: Negative   Blood: x / Protein: 30 mg/dL / Nitrite: Negative   Leuk Esterase: Trace / RBC: 0-2 /HPF / WBC 3-5 /HPF   Sq Epi: x / Non Sq Epi: Few /HPF / Bacteria: Few /HPF        CARDIAC MARKERS ( 2018 22:41 )  <0.015 ng/mL / x     / x     / x     / x                    MICROBIOLOGY: (if applicable)    RADIOLOGY & ADDITIONAL STUDIES:  EKG:   < from: Xray Chest 1 View AP/PA (18 @ 23:53) >    EXAM:  XR CHEST AP OR PA 1V                            PROCEDURE DATE:  2018          INTERPRETATION:  PORTABLE CHEST X-RAY    HISTORY: AMS.    COMPARISON: 2017.    FINDINGS:  Multiple EKG leads overlie the chest.  Mild bibasilar atelectasis.  No focal lung consolidation.  No pneumothorax or pleural effusion.   The cardiac silhouette is not accurately assessed by AP technique. Aortic   calcifications.    IMPRESSION:  Mild bibasilar atelectasis.                BARBARA PRUETT M.D., ATTENDING RADIOLOGIST  This document has been electronically signed. Leonardo  3 2018  9:43AM                < end of copied text >    CXR:  ECHO:    IMPRESSION: 85y Female PAST MEDICAL & SURGICAL HISTORY:  TIA (transient ischemic attack)  Myositis: on prednisone x 5 years  Osteoarthritis  HTN (hypertension)  Diabetes  No significant past surgical history       Patient is a 85F from home, AAOx2 at baseline, ambulates with cane and walker, lives with daughter, with PMH NIDDM, HTN, ckd stage 3, dementia, b/l femoral stent and 1 cardiac stent placed 8 years ago, iron deficiency anemia, presenting with episode of confusion at 9pm yesterday while at a family gathering. As per daughter, when family members left, patient asked when she is able to go home, but was already home. Also, earlier in the day, daughter took patient's BP which was sbp 210, so was given her medications a/w generalized weakness, sob on exertion, malaise.     +encephalopathy unclear etiology  +HARMON most likely due to deconditioning    RECOMMENDATIONS:   - neuro follow up   - o2 supp prn   - f/u bcx   - DVT and GI prophylaxis. CHIEF COMPLAINT: Patient is a 85y old  Female who presents with a chief complaint of AMS (2018 01:57)      HPI:  Patient is a 85F from home, AAOx2 at baseline, ambulates with cane and walker, lives with daughter, with PMH NIDDM, HTN, ckd stage 3, dementia, b/l femoral stent and 1 cardiac stent placed 8 years ago, iron deficiency anemia, presenting with episode of confusion at 9pm yesterday while at a family gathering. As per daughter, when family members left, patient asked when she is able to go home, but was already home. Also, earlier in the day, daughter took patient's BP which was sbp 210, so was given her medications a/w generalized weakness, sob on exertion, malaise. Daughter denies that patient had slurred speech, facial droop, and at baseline does not know the date or year, but that this episode was new for the patient. Denies fever, chills, palpitations, chest pain, nausea, vomiting, diarrhea or constipation. Patient was brought to the ED at 10:48pm, code stroke was called, however, as per daughter, patient was back to her baseline and NIHSS score was 0. Patient was seen and examined, ROS only positive for fatigue otherwise negative. Reports feeling well.    PMH: as per HPI  Surgical Hx: 2 fem stents and 1 cardiac stent  Fam Hx: none  Social Hx: neg for smoking or etoh  Allergies: NKDA (2018 01:57)   Patient seen and examined.     PAST MEDICAL & SURGICAL HISTORY:  TIA (transient ischemic attack)  Myositis: on prednisone x 5 years  Osteoarthritis  HTN (hypertension)  Diabetes  No significant past surgical history      Allergies    No Known Allergies    Intolerances        MEDICATIONS  (STANDING):  amLODIPine   Tablet 10 milliGRAM(s) Oral daily  aspirin  chewable 81 milliGRAM(s) Oral daily  atorvastatin 40 milliGRAM(s) Oral at bedtime  calcium carbonate 1250 mG + Vitamin D (OsCal 500 + D) 1 Tablet(s) Oral daily  cholecalciferol 1000 Unit(s) Oral daily  clopidogrel Tablet 75 milliGRAM(s) Oral daily  docusate sodium 100 milliGRAM(s) Oral two times a day  escitalopram 5 milliGRAM(s) Oral daily  famotidine    Tablet 20 milliGRAM(s) Oral daily  fenofibrate Tablet 145 milliGRAM(s) Oral daily  ferrous    sulfate 325 milliGRAM(s) Oral daily  gabapentin 100 milliGRAM(s) Oral three times a day  heparin  Injectable 5000 Unit(s) SubCutaneous every 8 hours  insulin glargine Injectable (LANTUS) 10 Unit(s) SubCutaneous at bedtime  insulin lispro (HumaLOG) corrective regimen sliding scale   SubCutaneous three times a day before meals  metoprolol tartrate 50 milliGRAM(s) Oral daily      MEDICATIONS  (PRN):  acetaminophen   Tablet 650 milliGRAM(s) Oral every 6 hours PRN mild pain (1-3) or fever (>100.4)   Medications up to date at time of exam.    FAMILY HISTORY:  No pertinent family history in first degree relatives      SOCIAL HISTORY  Smoking History: [   ] smoking/smoke exposure, [   ] former smoker, [ x ] denies smoking  Living Condition: [   ] apartment, [   ] private house  Work History:   Travel History: denies recent travel  Illicit Substance Use: denies  Alcohol Use: denies    REVIEW OF SYSTEMS:    CONSTITUTIONAL:  denies fevers, chills, sweats, weight loss    HEENT:  denies diplopia or blurred vision, sore throat or runny nose.    CARDIOVASCULAR:  denies pressure, squeezing, tightness, or heaviness about the chest; no palpitations.    RESPIRATORY:  denies SOB, cough, HARMON, wheezing.    GASTROINTESTINAL:  denies abdominal pain, nausea, vomiting or diarrhea.    GENITOURINARY: denies dysuria, frequency or urgency.    NEUROLOGIC:  denies numbness, tingling, seizures or weakness.    PSYCHIATRIC:  denies disorder of thought or mood.    MSK: denies swelling, redness      PHYSICAL EXAMINATION:    GENERAL: The patient is a well-developed, well-nourished, in no apparent distress.     Vital Signs Last 24 Hrs  T(C): 36.9 (2018 15:25), Max: 36.9 (2018 23:59)  T(F): 98.5 (2018 15:25), Max: 98.5 (2018 15:25)  HR: 68 (2018 15:25) (57 - 68)  BP: 139/57 (2018 15:25) (136/54 - 167/65)  BP(mean): --  RR: 17 (2018 15:25) (16 - 18)  SpO2: 98% (2018 15:25) (96% - 98%)   (if applicable)    Chest Tube (if applicable)    HEENT: Head is normocephalic and atraumatic. .    NECK: Supple, no palpable adenopathy.    LUNGS: Clear to auscultation, no wheezing, rales, or rhonchi.    HEART: Regular rate and rhythm without murmur.    ABDOMEN: Soft, nontender, and nondistended.  No hepatosplenomegaly is noted.    EXTREMITIES: Without any cyanosis, clubbing, rash, lesions or edema.    NEUROLOGIC: Awake, alert.    SKIN: Warm, dry, good turgor.      LABS:                        11.5   5.0   )-----------( 228      ( 2018 08:43 )             37.3     06-04    136  |  101  |  28<H>  ----------------------------<  298<H>  3.9   |  28  |  1.15    Ca    9.0      2018 08:43  Phos  2.9     06-  Mg     1.9     06-    TPro  6.6  /  Alb  3.5  /  TBili  0.2  /  DBili  x   /  AST  16  /  ALT  18  /  AlkPhos  24<L>  06-04    PT/INR - ( 2018 08:43 )   PT: 10.8 sec;   INR: 0.99 ratio         PTT - ( 2018 22:41 )  PTT:29.4 sec  Urinalysis Basic - ( 2018 01:46 )    Color: Yellow / Appearance: Clear / S.010 / pH: x  Gluc: x / Ketone: Negative  / Bili: Negative / Urobili: Negative   Blood: x / Protein: 30 mg/dL / Nitrite: Negative   Leuk Esterase: Trace / RBC: 0-2 /HPF / WBC 3-5 /HPF   Sq Epi: x / Non Sq Epi: Few /HPF / Bacteria: Few /HPF        CARDIAC MARKERS ( 2018 22:41 )  <0.015 ng/mL / x     / x     / x     / x                    MICROBIOLOGY: (if applicable)    RADIOLOGY & ADDITIONAL STUDIES:  EKG:   < from: Xray Chest 1 View AP/PA (18 @ 23:53) >    EXAM:  XR CHEST AP OR PA 1V                            PROCEDURE DATE:  2018          INTERPRETATION:  PORTABLE CHEST X-RAY    HISTORY: AMS.    COMPARISON: 2017.    FINDINGS:  Multiple EKG leads overlie the chest.  Mild bibasilar atelectasis.  No focal lung consolidation.  No pneumothorax or pleural effusion.   The cardiac silhouette is not accurately assessed by AP technique. Aortic   calcifications.    IMPRESSION:  Mild bibasilar atelectasis.                BARBARA PRUETT M.D., ATTENDING RADIOLOGIST  This document has been electronically signed. Leonardo  3 2018  9:43AM                < end of copied text >    CXR:  ECHO:    IMPRESSION: 85y Female PAST MEDICAL & SURGICAL HISTORY:  TIA (transient ischemic attack)  Myositis: on prednisone x 5 years  Osteoarthritis  HTN (hypertension)  Diabetes  No significant past surgical history       Patient is a 85F from home, AAOx2 at baseline, ambulates with cane and walker, lives with daughter, with PMH NIDDM, HTN, ckd stage 3, dementia, b/l femoral stent and 1 cardiac stent placed 8 years ago, iron deficiency anemia, presenting with episode of confusion at 9pm yesterday while at a family gathering. As per daughter, when family members left, patient asked when she is able to go home, but was already home. Also, earlier in the day, daughter took patient's BP which was sbp 210, so was given her medications a/w generalized weakness, sob on exertion, malaise.     +encephalopathy unclear etiology  +HARMON most likely due to deconditioning  +atelectasis on CXR    RECOMMENDATIONS:   - neuro follow up   - o2 supp prn   - f/u bcx   - DVT and GI prophylaxis. CHIEF COMPLAINT: Patient is a 85y old  Female who presents with a chief complaint of AMS (2018 01:57)      HPI:  Patient is a 85F from home, AAOx2 at baseline, ambulates with cane and walker, lives with daughter, with PMH NIDDM, HTN, ckd stage 3, dementia, b/l femoral stent and 1 cardiac stent placed 8 years ago, iron deficiency anemia, presenting with episode of confusion at 9pm yesterday while at a family gathering. As per daughter, when family members left, patient asked when she is able to go home, but was already home. Also, earlier in the day, daughter took patient's BP which was sbp 210, so was given her medications a/w generalized weakness, sob on exertion, malaise. Daughter denies that patient had slurred speech, facial droop, and at baseline does not know the date or year, but that this episode was new for the patient. Denies fever, chills, palpitations, chest pain, nausea, vomiting, diarrhea or constipation. Patient was brought to the ED at 10:48pm, code stroke was called, however, as per daughter, patient was back to her baseline and NIHSS score was 0. Patient was seen and examined, ROS only positive for fatigue otherwise negative. Reports feeling well.    PMH: as per HPI  Surgical Hx: 2 fem stents and 1 cardiac stent  Fam Hx: none  Social Hx: neg for smoking or etoh  Allergies: NKDA (2018 01:57)   Patient seen and examined.     PAST MEDICAL & SURGICAL HISTORY:  TIA (transient ischemic attack)  Myositis: on prednisone x 5 years  Osteoarthritis  HTN (hypertension)  Diabetes  No significant past surgical history      Allergies    No Known Allergies    Intolerances        MEDICATIONS  (STANDING):  amLODIPine   Tablet 10 milliGRAM(s) Oral daily  aspirin  chewable 81 milliGRAM(s) Oral daily  atorvastatin 40 milliGRAM(s) Oral at bedtime  calcium carbonate 1250 mG + Vitamin D (OsCal 500 + D) 1 Tablet(s) Oral daily  cholecalciferol 1000 Unit(s) Oral daily  clopidogrel Tablet 75 milliGRAM(s) Oral daily  docusate sodium 100 milliGRAM(s) Oral two times a day  escitalopram 5 milliGRAM(s) Oral daily  famotidine    Tablet 20 milliGRAM(s) Oral daily  fenofibrate Tablet 145 milliGRAM(s) Oral daily  ferrous    sulfate 325 milliGRAM(s) Oral daily  gabapentin 100 milliGRAM(s) Oral three times a day  heparin  Injectable 5000 Unit(s) SubCutaneous every 8 hours  insulin glargine Injectable (LANTUS) 10 Unit(s) SubCutaneous at bedtime  insulin lispro (HumaLOG) corrective regimen sliding scale   SubCutaneous three times a day before meals  metoprolol tartrate 50 milliGRAM(s) Oral daily      MEDICATIONS  (PRN):  acetaminophen   Tablet 650 milliGRAM(s) Oral every 6 hours PRN mild pain (1-3) or fever (>100.4)   Medications up to date at time of exam.    FAMILY HISTORY:  No pertinent family history in first degree relatives      SOCIAL HISTORY  Smoking History: [   ] smoking/smoke exposure, [   ] former smoker, [ x ] denies smoking  Living Condition: [   ] apartment, [   ] private house  Work History:   Travel History: denies recent travel  Illicit Substance Use: denies  Alcohol Use: denies    REVIEW OF SYSTEMS:    CONSTITUTIONAL:  denies fevers, chills, sweats, weight loss    HEENT:  denies diplopia or blurred vision, sore throat or runny nose.    CARDIOVASCULAR:  denies pressure, squeezing, tightness, or heaviness about the chest; no palpitations.    RESPIRATORY:  denies SOB, cough, HARMON, wheezing.    GASTROINTESTINAL:  denies abdominal pain, nausea, vomiting or diarrhea.    GENITOURINARY: denies dysuria, frequency or urgency.    NEUROLOGIC:  denies numbness, tingling, seizures or weakness.    PSYCHIATRIC:  denies disorder of thought or mood.    MSK: denies swelling, redness      PHYSICAL EXAMINATION:    GENERAL: The patient is a well-developed, well-nourished, in no apparent distress.     Vital Signs Last 24 Hrs  T(C): 36.9 (2018 15:25), Max: 36.9 (2018 23:59)  T(F): 98.5 (2018 15:25), Max: 98.5 (2018 15:25)  HR: 68 (2018 15:25) (57 - 68)  BP: 139/57 (2018 15:25) (136/54 - 167/65)  BP(mean): --  RR: 17 (2018 15:25) (16 - 18)  SpO2: 98% (2018 15:25) (96% - 98%)   (if applicable)    Chest Tube (if applicable)    HEENT: Head is normocephalic and atraumatic. .    NECK: Supple, no palpable adenopathy.    LUNGS: Clear to auscultation, no wheezing, rales, or rhonchi.    HEART: Regular rate and rhythm without murmur.    ABDOMEN: Soft, nontender, and nondistended.  No hepatosplenomegaly is noted.    EXTREMITIES: Without any cyanosis, clubbing, rash, lesions or edema.    NEUROLOGIC: Awake, alert.    SKIN: Warm, dry, good turgor.      LABS:                        11.5   5.0   )-----------( 228      ( 2018 08:43 )             37.3     06-04    136  |  101  |  28<H>  ----------------------------<  298<H>  3.9   |  28  |  1.15    Ca    9.0      2018 08:43  Phos  2.9     06-  Mg     1.9     06-    TPro  6.6  /  Alb  3.5  /  TBili  0.2  /  DBili  x   /  AST  16  /  ALT  18  /  AlkPhos  24<L>  06-04    PT/INR - ( 2018 08:43 )   PT: 10.8 sec;   INR: 0.99 ratio         PTT - ( 2018 22:41 )  PTT:29.4 sec  Urinalysis Basic - ( 2018 01:46 )    Color: Yellow / Appearance: Clear / S.010 / pH: x  Gluc: x / Ketone: Negative  / Bili: Negative / Urobili: Negative   Blood: x / Protein: 30 mg/dL / Nitrite: Negative   Leuk Esterase: Trace / RBC: 0-2 /HPF / WBC 3-5 /HPF   Sq Epi: x / Non Sq Epi: Few /HPF / Bacteria: Few /HPF        CARDIAC MARKERS ( 2018 22:41 )  <0.015 ng/mL / x     / x     / x     / x                    MICROBIOLOGY: (if applicable)    RADIOLOGY & ADDITIONAL STUDIES:  EKG:   < from: Xray Chest 1 View AP/PA (18 @ 23:53) >    EXAM:  XR CHEST AP OR PA 1V                            PROCEDURE DATE:  2018          INTERPRETATION:  PORTABLE CHEST X-RAY    HISTORY: AMS.    COMPARISON: 2017.    FINDINGS:  Multiple EKG leads overlie the chest.  Mild bibasilar atelectasis.  No focal lung consolidation.  No pneumothorax or pleural effusion.   The cardiac silhouette is not accurately assessed by AP technique. Aortic   calcifications.    IMPRESSION:  Mild bibasilar atelectasis.                BARBARA PRUETT M.D., ATTENDING RADIOLOGIST  This document has been electronically signed. Leonardo  3 2018  9:43AM                < end of copied text >    CXR:  ECHO:    IMPRESSION: 85y Female PAST MEDICAL & SURGICAL HISTORY:  TIA (transient ischemic attack)  Myositis: on prednisone x 5 years  Osteoarthritis  HTN (hypertension)  Diabetes  No significant past surgical history       Patient is a 85F from home, AAOx2 at baseline, ambulates with cane and walker, lives with daughter, with PMH NIDDM, HTN, ckd stage 3, dementia, b/l femoral stent and 1 cardiac stent placed 8 years ago, iron deficiency anemia, presenting with episode of confusion at 9pm yesterday while at a family gathering. As per daughter, when family members left, patient asked when she is able to go home, but was already home. Also, earlier in the day, daughter took patient's BP which was sbp 210, so was given her medications a/w generalized weakness, sob on exertion, malaise.     +encephalopathy unclear etiology  +HARMON most likely due to deconditioning  +atelectasis on CXR    RECOMMENDATIONS:   - neuro follow up   - o2 supp prn   - f/u bcx   - DVT and GI prophylaxis.     Agree with above assessment and plan as transcribed.

## 2018-06-04 NOTE — PROGRESS NOTE ADULT - SUBJECTIVE AND OBJECTIVE BOX
· Subjective and Objective: 	  PGY 1 Note discussed with supervising resident and primary attending    Patient is a 85y old  Female who presents with a chief complaint of AMS (2018 01:57)      INTERVAL HPI/OVERNIGHT EVENTS: Patient seen and examined at bed side, denies any complaints offers     MEDICATIONS  (STANDING):  amLODIPine   Tablet 10 milliGRAM(s) Oral daily  aspirin  chewable 81 milliGRAM(s) Oral daily  atorvastatin 40 milliGRAM(s) Oral at bedtime  calcium carbonate 1250 mG + Vitamin D (OsCal 500 + D) 1 Tablet(s) Oral daily  cholecalciferol 1000 Unit(s) Oral daily  clopidogrel Tablet 75 milliGRAM(s) Oral daily  docusate sodium 100 milliGRAM(s) Oral two times a day  escitalopram 5 milliGRAM(s) Oral daily  famotidine    Tablet 20 milliGRAM(s) Oral daily  fenofibrate Tablet 145 milliGRAM(s) Oral daily  ferrous    sulfate 325 milliGRAM(s) Oral daily  gabapentin 100 milliGRAM(s) Oral three times a day  heparin  Injectable 5000 Unit(s) SubCutaneous every 8 hours  insulin glargine Injectable (LANTUS) 10 Unit(s) SubCutaneous at bedtime  insulin lispro (HumaLOG) corrective regimen sliding scale   SubCutaneous three times a day before meals  metoprolol tartrate 50 milliGRAM(s) Oral daily    MEDICATIONS  (PRN):  acetaminophen   Tablet 650 milliGRAM(s) Oral every 6 hours PRN mild pain (1-3) or fever (>100.4)      __________________________________________________  REVIEW OF SYSTEMS:    CONSTITUTIONAL: No fever,   EYES: no acute visual disturbances  NECK: No pain or stiffness  RESPIRATORY: No cough; No shortness of breath  CARDIOVASCULAR: No chest pain, no palpitations  GASTROINTESTINAL: No pain. No nausea or vomiting; No diarrhea   NEUROLOGICAL: No headache or numbness, no tremors  MUSCULOSKELETAL: No joint pain, no muscle pain  GENITOURINARY: no dysuria, no frequency, no hesitancy  PSYCHIATRY: no depression , no anxiety  ALL OTHER  ROS negative        Vital Signs Last 24 Hrs  T(C): 36.8 (2018 11:15), Max: 36.9 (2018 16:05)  T(F): 98.2 (2018 11:15), Max: 98.4 (2018 16:05)  HR: 60 (2018 11:15) (57 - 68)  BP: 148/69 (2018 11:15) (136/54 - 167/65)  BP(mean): --  RR: 16 (2018 11:15) (16 - 18)  SpO2: 98% (2018 11:15) (96% - 100%)    ________________________________________________  PHYSICAL EXAM:  GENERAL: NAD  HEENT: Normocephalic;  conjunctivae and sclerae clear; moist mucous membranes;   NECK : supple  CHEST/LUNG: Clear to auscultation bilaterally with good air entry   HEART: S1 S2  regular; no murmurs, gallops or rubs  ABDOMEN: Soft, Nontender, Nondistended; Bowel sounds present  EXTREMITIES: no cyanosis; no edema; no calf tenderness  SKIN: warm and dry; no rash  NERVOUS SYSTEM:  Awake and alert; Oriented  to place, person and time ; no new deficits    _________________________________________________  LABS:                        11.5   5.0   )-----------( 228      ( 2018 08:43 )             37.3     06-04    136  |  101  |  28<H>  ----------------------------<  298<H>  3.9   |  28  |  1.15    Ca    9.0      2018 08:43  Phos  2.9     06-04  Mg     1.9     06-04    TPro  6.6  /  Alb  3.5  /  TBili  0.2  /  DBili  x   /  AST  16  /  ALT  18  /  AlkPhos  24<L>  06-04    PT/INR - ( 2018 08:43 )   PT: 10.8 sec;   INR: 0.99 ratio         PTT - ( 2018 22:41 )  PTT:29.4 sec  Urinalysis Basic - ( 2018 01:46 )    Color: Yellow / Appearance: Clear / S.010 / pH: x  Gluc: x / Ketone: Negative  / Bili: Negative / Urobili: Negative   Blood: x / Protein: 30 mg/dL / Nitrite: Negative   Leuk Esterase: Trace / RBC: 0-2 /HPF / WBC 3-5 /HPF   Sq Epi: x / Non Sq Epi: Few /HPF / Bacteria: Few /HPF      CAPILLARY BLOOD GLUCOSE      POCT Blood Glucose.: 464 mg/dL (2018 11:51)        RADIOLOGY & ADDITIONAL TESTS:  US doppler ndication: CVA. Transient ischemic attack    Carotid duplex Doppler ultrasound is performed. Grayscale ultrasound   demonstrates atherosclerotic plaques in the left carotid bulb and   proximal right internal carotid artery. The flow velocity measurements   during peak systolic phase in centimeters per second are as the following:    Right CCA 58, ICA 81, ECA 40.  Left CCA 70, , ECA 82.    The end diastolic velocity measurement for the right ICA is 17, and  for   the left ICA is 18.    The peak systolic ICA/CCA velocity ratio on the right is 1.4, and on the   left is 2.0.    The right vertebral artery maintains normal antegrade flow direction. The   left vertebral artery is not visualized.    Impression: Moderate (50-69%) stenosis in the left internal carotid   artery by velocity criteria.    No hemodynamically significant stenosisin the right internal carotid   artery by velocity criteria.      Imaging Personally Reviewed:  YES/NO    Consultant(s) Notes Reviewed:   YES/ No    Care Discussed with Consultants :     Plan of care was discussed with patient and /or primary care giver; all questions and concerns were addressed and care was aligned with patient's wishes.        Assessment and Plan:   · Assessment		  Patient is a 85F from home, AAOx2 at baseline, ambulates with cane and walker, lives with daughter, with PMH NIDDM, HTN, ckd stage 3, dementia, b/l femoral stent and 1 cardiac stent placed 8 years ago, iron deficiency anemia, presenting with episode of confusion a/w malaise, generalized weakness, sob, and uncontrolled HTN with , with symptoms starting at 9pm yesterday while at a family gathering. Admitted for altered mental status likely 2/2 TIA vs malignant HTN vs less likely worsening dementia.     Problem/Plan - 1:  ·  Problem: TIA (transient ischemic attack).  Plan: code stroke, NIHSS score 0, back to baseline mental status so no tPA given  no neuro deficit on exam  CT head neg  carotid doppler showed   echo Moderate (50-69%) stenosis in the left internal carotid   artery by velocity criteria.  asa, statin, heparin sq  passed bedside swallow, regular diet  neurology Dr Hieu scherer noted   vascular surgery consulted.  F/u ECHO.      Problem/Plan - 2:  ·  Problem: HTN (hypertension).  Plan: resuming norvasc 10mg, lopressor 50mg in AM, losartan 50mg daily  monitor BP  BP well controlled.      Problem/Plan - 3:  ·  Problem: Dementia.  Plan: c/w escitalopram  AAOX2, at her baseline upon admission as per daughter.      Problem/Plan - 4:  ·  Problem: Diabetes.  Plan: holding home janumet  f/u HbA1C 8.4  monitor FS ac hs  diabetic diet  Elidia scherer noted   Lantus 10 units Hs   sliding scale.      Problem/Plan - 5:  ·  Problem: Hyperlipidemia.  Plan: statin for TIA  c/w home fenofibrate   lipid profile wnl.      Problem/Plan - 6:  Problem: Osteoarthritis. Plan: tylenol PRN.     Problem/Plan - 7:  ·  Problem: Need for prophylactic measure.  Plan: IMPROVE VTE Individual Risk Assessment

## 2018-06-04 NOTE — CONSULT NOTE ADULT - PROBLEM SELECTOR RECOMMENDATION 9
- patient takes metformin 1000 mg bid   -c/w HSS  and lantus 10  units bedtime   -will restart home meds and consider actose -15  mg daily upon discharge   -monitor blood sugar closely - patient takes JANUMET 50 /1000 mg bid   -c/w HSS  and lantus 10  units bedtime   -will restart home meds and consider actose -15  mg / acrabose 50 mg  daily upon discharge   -monitor blood sugar closely - patient takes JANUMET 50 /1000 mg bid   -c/w HSS  and lantus 10  units bedtime   -will restart home meds and consider actos -15  mg vs acrabose 50 mg  daily upon discharge   -monitor blood sugar closely  d/w family and hs

## 2018-06-04 NOTE — PROGRESS NOTE ADULT - SUBJECTIVE AND OBJECTIVE BOX
PGY 1 Note discussed with supervising resident and primary attending    Patient is a 85y old  Female who presents with a chief complaint of AMS (2018 01:57)      INTERVAL HPI/OVERNIGHT EVENTS: Patient seen and examined at bed side, denies any complaints offers     MEDICATIONS  (STANDING):  amLODIPine   Tablet 10 milliGRAM(s) Oral daily  aspirin  chewable 81 milliGRAM(s) Oral daily  atorvastatin 40 milliGRAM(s) Oral at bedtime  calcium carbonate 1250 mG + Vitamin D (OsCal 500 + D) 1 Tablet(s) Oral daily  cholecalciferol 1000 Unit(s) Oral daily  clopidogrel Tablet 75 milliGRAM(s) Oral daily  docusate sodium 100 milliGRAM(s) Oral two times a day  escitalopram 5 milliGRAM(s) Oral daily  famotidine    Tablet 20 milliGRAM(s) Oral daily  fenofibrate Tablet 145 milliGRAM(s) Oral daily  ferrous    sulfate 325 milliGRAM(s) Oral daily  gabapentin 100 milliGRAM(s) Oral three times a day  heparin  Injectable 5000 Unit(s) SubCutaneous every 8 hours  insulin glargine Injectable (LANTUS) 10 Unit(s) SubCutaneous at bedtime  insulin lispro (HumaLOG) corrective regimen sliding scale   SubCutaneous three times a day before meals  metoprolol tartrate 50 milliGRAM(s) Oral daily    MEDICATIONS  (PRN):  acetaminophen   Tablet 650 milliGRAM(s) Oral every 6 hours PRN mild pain (1-3) or fever (>100.4)      __________________________________________________  REVIEW OF SYSTEMS:    CONSTITUTIONAL: No fever,   EYES: no acute visual disturbances  NECK: No pain or stiffness  RESPIRATORY: No cough; No shortness of breath  CARDIOVASCULAR: No chest pain, no palpitations  GASTROINTESTINAL: No pain. No nausea or vomiting; No diarrhea   NEUROLOGICAL: No headache or numbness, no tremors  MUSCULOSKELETAL: No joint pain, no muscle pain  GENITOURINARY: no dysuria, no frequency, no hesitancy  PSYCHIATRY: no depression , no anxiety  ALL OTHER  ROS negative        Vital Signs Last 24 Hrs  T(C): 36.8 (2018 11:15), Max: 36.9 (2018 16:05)  T(F): 98.2 (2018 11:15), Max: 98.4 (2018 16:05)  HR: 60 (2018 11:15) (57 - 68)  BP: 148/69 (2018 11:15) (136/54 - 167/65)  BP(mean): --  RR: 16 (2018 11:15) (16 - 18)  SpO2: 98% (2018 11:15) (96% - 100%)    ________________________________________________  PHYSICAL EXAM:  GENERAL: NAD  HEENT: Normocephalic;  conjunctivae and sclerae clear; moist mucous membranes;   NECK : supple  CHEST/LUNG: Clear to auscultation bilaterally with good air entry   HEART: S1 S2  regular; no murmurs, gallops or rubs  ABDOMEN: Soft, Nontender, Nondistended; Bowel sounds present  EXTREMITIES: no cyanosis; no edema; no calf tenderness  SKIN: warm and dry; no rash  NERVOUS SYSTEM:  Awake and alert; Oriented  to place, person and time ; no new deficits    _________________________________________________  LABS:                        11.5   5.0   )-----------( 228      ( 2018 08:43 )             37.3     06-04    136  |  101  |  28<H>  ----------------------------<  298<H>  3.9   |  28  |  1.15    Ca    9.0      2018 08:43  Phos  2.9     06-04  Mg     1.9     06-04    TPro  6.6  /  Alb  3.5  /  TBili  0.2  /  DBili  x   /  AST  16  /  ALT  18  /  AlkPhos  24<L>  06-04    PT/INR - ( 2018 08:43 )   PT: 10.8 sec;   INR: 0.99 ratio         PTT - ( 2018 22:41 )  PTT:29.4 sec  Urinalysis Basic - ( 2018 01:46 )    Color: Yellow / Appearance: Clear / S.010 / pH: x  Gluc: x / Ketone: Negative  / Bili: Negative / Urobili: Negative   Blood: x / Protein: 30 mg/dL / Nitrite: Negative   Leuk Esterase: Trace / RBC: 0-2 /HPF / WBC 3-5 /HPF   Sq Epi: x / Non Sq Epi: Few /HPF / Bacteria: Few /HPF      CAPILLARY BLOOD GLUCOSE      POCT Blood Glucose.: 464 mg/dL (2018 11:51)        RADIOLOGY & ADDITIONAL TESTS:  US doppler ndication: CVA. Transient ischemic attack    Carotid duplex Doppler ultrasound is performed. Grayscale ultrasound   demonstrates atherosclerotic plaques in the left carotid bulb and   proximal right internal carotid artery. The flow velocity measurements   during peak systolic phase in centimeters per second are as the following:    Right CCA 58, ICA 81, ECA 40.  Left CCA 70, , ECA 82.    The end diastolic velocity measurement for the right ICA is 17, and  for   the left ICA is 18.    The peak systolic ICA/CCA velocity ratio on the right is 1.4, and on the   left is 2.0.    The right vertebral artery maintains normal antegrade flow direction. The   left vertebral artery is not visualized.    Impression: Moderate (50-69%) stenosis in the left internal carotid   artery by velocity criteria.    No hemodynamically significant stenosisin the right internal carotid   artery by velocity criteria.      Imaging Personally Reviewed:  YES/NO    Consultant(s) Notes Reviewed:   YES/ No    Care Discussed with Consultants :     Plan of care was discussed with patient and /or primary care giver; all questions and concerns were addressed and care was aligned with patient's wishes.

## 2018-06-04 NOTE — CONSULT NOTE ADULT - SUBJECTIVE AND OBJECTIVE BOX
Patient is a 85y old  Female who presents with a chief complaint of AMS (2018 01:57)      HPI:  Patient is a 85F from home, AAOx2 at baseline, ambulates with cane and walker, lives with daughter, with PMH NIDDM, HTN, ckd stage 3, dementia, b/l femoral stent and 1 cardiac stent placed 8 years ago, iron deficiency anemia, presenting with episode of confusion at 9pm yesterday while at a family gathering. As per daughter, when family members left, patient asked when she is able to go home, but was already home. Also, earlier in the day, daughter took patient's BP which was sbp 210, so was given her medications a/w generalized weakness, sob on exertion, malaise. Daughter denies that patient had slurred speech, facial droop, and at baseline does not know the date or year, but that this episode was new for the patient. Denies fever, chills, palpitations, chest pain, nausea, vomiting, diarrhea or constipation. Patient was brought to the ED at 10:48pm, code stroke was called, however, as per daughter, patient was back to her baseline and NIHSS score was 0. Patient was seen and examined, ROS only positive for fatigue otherwise negative. Reports feeling well.    PMH: as per HPI  Surgical Hx: 2 fem stents and 1 cardiac stent  Fam Hx: none  Social Hx: neg for smoking or etoh  Allergies: NKDA (2018 01:57)      PAST MEDICAL & SURGICAL HISTORY:  TIA (transient ischemic attack)  Myositis: on prednisone x 5 years  Osteoarthritis  HTN (hypertension)  Diabetes  No significant past surgical history         MEDICATIONS  (STANDING):  amLODIPine   Tablet 10 milliGRAM(s) Oral daily  aspirin  chewable 81 milliGRAM(s) Oral daily  atorvastatin 40 milliGRAM(s) Oral at bedtime  calcium carbonate 1250 mG + Vitamin D (OsCal 500 + D) 1 Tablet(s) Oral daily  clopidogrel Tablet 75 milliGRAM(s) Oral daily  docusate sodium 100 milliGRAM(s) Oral two times a day  escitalopram 5 milliGRAM(s) Oral daily  fenofibrate Tablet 145 milliGRAM(s) Oral daily  ferrous    sulfate 325 milliGRAM(s) Oral daily  gabapentin 100 milliGRAM(s) Oral three times a day  heparin  Injectable 5000 Unit(s) SubCutaneous every 8 hours  metoprolol tartrate 50 milliGRAM(s) Oral daily    MEDICATIONS  (PRN):  acetaminophen   Tablet 650 milliGRAM(s) Oral every 6 hours PRN mild pain (1-3) or fever (>100.4)      FAMILY HISTORY:  No pertinent family history in first degree relatives      SOCIAL HISTORY:      REVIEW OF SYSTEMS:  CONSTITUTIONAL: No fever, weight loss, or fatigue  EYES: No eye pain, visual disturbances, or discharge  ENT:  No difficulty hearing, tinnitus, vertigo; No sinus or throat pain  NECK: No pain or stiffness  RESPIRATORY: No cough, wheezing, chills or hemoptysis; No Shortness of Breath  CARDIOVASCULAR: No chest pain, palpitations, passing out, dizziness, or leg swelling  GASTROINTESTINAL: No abdominal or epigastric pain. No nausea, vomiting, or hematemesis; No diarrhea or constipation. No melena or hematochezia.  GENITOURINARY: No dysuria, frequency, hematuria, or incontinence  NEUROLOGICAL: No headaches, memory loss, loss of strength, numbness, or tremors  SKIN: No itching, burning, rashes, or lesions   LYMPH Nodes: No enlarged glands  ENDOCRINE: No heat or cold intolerance; No hair loss  MUSCULOSKELETAL: No joint pain or swelling; No muscle, back, or extremity pain  PSYCHIATRIC: No depression, anxiety, mood swings, or difficulty sleeping  HEME/LYMPH: No easy bruising, or bleeding gums  ALLERGY AND IMMUNOLOGIC: No hives or eczema	        Vital Signs Last 24 Hrs  T(C): 36.5 (2018 08:33), Max: 36.9 (2018 16:05)  T(F): 97.7 (2018 08:33), Max: 98.4 (2018 16:05)  HR: 57 (2018 08:33) (57 - 68)  BP: 156/88 (2018 08:33) (135/64 - 167/65)  BP(mean): --  RR: 18 (2018 08:33) (16 - 18)  SpO2: 96% (2018 08:33) (95% - 100%)      Constitutional:    NC/AT:    HEENT:    Neck:  No JVD, bruits or thyromegaly    Respiratory:  Clear without rales or rhonchi    Cardiovascular:  RR without murmur, rub or gallop.    Gastrointestinal: Soft without hepatosplenomegaly.    Extremities: without cyanosis, clubbing or edema.    Neurological:  Oriented   x  3   . No gross sensory or motor defects.        LABS:                        11.3   7.0   )-----------( 248      ( 2018 06:55 )             36.3     06-    136  |  103  |  25<H>  ----------------------------<  228<H>  4.2   |  25  |  1.14    Ca    9.2      2018 06:55  Phos  2.6     06-  Mg     1.9     -    TPro  6.9  /  Alb  3.5  /  TBili  0.2  /  DBili  x   /  AST  14  /  ALT  17  /  AlkPhos  27<L>  -    CARDIAC MARKERS ( 2018 22:41 )  <0.015 ng/mL / x     / x     / x     / x          PT/INR - ( 2018 06:55 )   PT: 11.3 sec;   INR: 1.04 ratio         PTT - ( 2018 22:41 )  PTT:29.4 sec  Urinalysis Basic - ( 2018 01:46 )    Color: Yellow / Appearance: Clear / S.010 / pH: x  Gluc: x / Ketone: Negative  / Bili: Negative / Urobili: Negative   Blood: x / Protein: 30 mg/dL / Nitrite: Negative   Leuk Esterase: Trace / RBC: 0-2 /HPF / WBC 3-5 /HPF   Sq Epi: x / Non Sq Epi: Few /HPF / Bacteria: Few /HPF              RADIOLOGY & ADDITIONAL STUDIES: cxr :  mild bibasilar atelectasis.

## 2018-06-05 DIAGNOSIS — N18.9 CHRONIC KIDNEY DISEASE, UNSPECIFIED: ICD-10-CM

## 2018-06-05 LAB
ANION GAP SERPL CALC-SCNC: 6 MMOL/L — SIGNIFICANT CHANGE UP (ref 5–17)
BUN SERPL-MCNC: 36 MG/DL — HIGH (ref 7–18)
CALCIUM SERPL-MCNC: 9 MG/DL — SIGNIFICANT CHANGE UP (ref 8.4–10.5)
CHLORIDE SERPL-SCNC: 103 MMOL/L — SIGNIFICANT CHANGE UP (ref 96–108)
CO2 SERPL-SCNC: 29 MMOL/L — SIGNIFICANT CHANGE UP (ref 22–31)
CREAT SERPL-MCNC: 1.44 MG/DL — HIGH (ref 0.5–1.3)
GLUCOSE BLDC GLUCOMTR-MCNC: 110 MG/DL — HIGH (ref 70–99)
GLUCOSE BLDC GLUCOMTR-MCNC: 209 MG/DL — HIGH (ref 70–99)
GLUCOSE BLDC GLUCOMTR-MCNC: 217 MG/DL — HIGH (ref 70–99)
GLUCOSE BLDC GLUCOMTR-MCNC: 375 MG/DL — HIGH (ref 70–99)
GLUCOSE SERPL-MCNC: 215 MG/DL — HIGH (ref 70–99)
POTASSIUM SERPL-MCNC: 4.1 MMOL/L — SIGNIFICANT CHANGE UP (ref 3.5–5.3)
POTASSIUM SERPL-SCNC: 4.1 MMOL/L — SIGNIFICANT CHANGE UP (ref 3.5–5.3)
SODIUM SERPL-SCNC: 138 MMOL/L — SIGNIFICANT CHANGE UP (ref 135–145)

## 2018-06-05 RX ORDER — INSULIN GLARGINE 100 [IU]/ML
15 INJECTION, SOLUTION SUBCUTANEOUS AT BEDTIME
Qty: 0 | Refills: 0 | Status: DISCONTINUED | OUTPATIENT
Start: 2018-06-05 | End: 2018-06-06

## 2018-06-05 RX ORDER — SODIUM CHLORIDE 9 MG/ML
1000 INJECTION INTRAMUSCULAR; INTRAVENOUS; SUBCUTANEOUS
Qty: 0 | Refills: 0 | Status: DISCONTINUED | OUTPATIENT
Start: 2018-06-05 | End: 2018-06-06

## 2018-06-05 RX ORDER — INSULIN LISPRO 100/ML
4 VIAL (ML) SUBCUTANEOUS
Qty: 0 | Refills: 0 | Status: DISCONTINUED | OUTPATIENT
Start: 2018-06-05 | End: 2018-06-06

## 2018-06-05 RX ORDER — ERGOCALCIFEROL 1.25 MG/1
50000 CAPSULE ORAL
Qty: 0 | Refills: 0 | Status: DISCONTINUED | OUTPATIENT
Start: 2018-06-05 | End: 2018-06-06

## 2018-06-05 RX ADMIN — Medication 650 MILLIGRAM(S): at 22:15

## 2018-06-05 RX ADMIN — Medication 4 UNIT(S): at 17:35

## 2018-06-05 RX ADMIN — Medication 325 MILLIGRAM(S): at 14:40

## 2018-06-05 RX ADMIN — GABAPENTIN 100 MILLIGRAM(S): 400 CAPSULE ORAL at 14:42

## 2018-06-05 RX ADMIN — ATORVASTATIN CALCIUM 40 MILLIGRAM(S): 80 TABLET, FILM COATED ORAL at 22:14

## 2018-06-05 RX ADMIN — Medication 650 MILLIGRAM(S): at 08:38

## 2018-06-05 RX ADMIN — ESCITALOPRAM OXALATE 5 MILLIGRAM(S): 10 TABLET, FILM COATED ORAL at 12:28

## 2018-06-05 RX ADMIN — HEPARIN SODIUM 5000 UNIT(S): 5000 INJECTION INTRAVENOUS; SUBCUTANEOUS at 14:42

## 2018-06-05 RX ADMIN — Medication 81 MILLIGRAM(S): at 12:28

## 2018-06-05 RX ADMIN — Medication 100 MILLIGRAM(S): at 17:35

## 2018-06-05 RX ADMIN — Medication 1 TABLET(S): at 12:27

## 2018-06-05 RX ADMIN — Medication 100 MILLIGRAM(S): at 05:29

## 2018-06-05 RX ADMIN — INSULIN GLARGINE 15 UNIT(S): 100 INJECTION, SOLUTION SUBCUTANEOUS at 22:15

## 2018-06-05 RX ADMIN — SODIUM CHLORIDE 65 MILLILITER(S): 9 INJECTION INTRAMUSCULAR; INTRAVENOUS; SUBCUTANEOUS at 17:36

## 2018-06-05 RX ADMIN — Medication 1000 UNIT(S): at 17:35

## 2018-06-05 RX ADMIN — ERGOCALCIFEROL 50000 UNIT(S): 1.25 CAPSULE ORAL at 14:40

## 2018-06-05 RX ADMIN — Medication 50 MILLIGRAM(S): at 05:29

## 2018-06-05 RX ADMIN — Medication 2: at 08:36

## 2018-06-05 RX ADMIN — HEPARIN SODIUM 5000 UNIT(S): 5000 INJECTION INTRAVENOUS; SUBCUTANEOUS at 22:15

## 2018-06-05 RX ADMIN — Medication 5: at 12:29

## 2018-06-05 RX ADMIN — CLOPIDOGREL BISULFATE 75 MILLIGRAM(S): 75 TABLET, FILM COATED ORAL at 12:28

## 2018-06-05 RX ADMIN — GABAPENTIN 100 MILLIGRAM(S): 400 CAPSULE ORAL at 05:29

## 2018-06-05 RX ADMIN — AMLODIPINE BESYLATE 10 MILLIGRAM(S): 2.5 TABLET ORAL at 05:29

## 2018-06-05 RX ADMIN — GABAPENTIN 100 MILLIGRAM(S): 400 CAPSULE ORAL at 22:14

## 2018-06-05 RX ADMIN — FAMOTIDINE 20 MILLIGRAM(S): 10 INJECTION INTRAVENOUS at 12:27

## 2018-06-05 RX ADMIN — Medication 4 UNIT(S): at 12:30

## 2018-06-05 RX ADMIN — HEPARIN SODIUM 5000 UNIT(S): 5000 INJECTION INTRAVENOUS; SUBCUTANEOUS at 05:29

## 2018-06-05 NOTE — PROGRESS NOTE ADULT - SUBJECTIVE AND OBJECTIVE BOX
PGY 1 Note discussed with supervising resident and primary attending    Patient is a 85y old  Female who presents with a chief complaint of AMS (03 Jun 2018 01:57)      INTERVAL HPI/OVERNIGHT EVENTS: Patient seen and examined at bed side,  offers no new complaints; current symptoms resolving    MEDICATIONS  (STANDING):  amLODIPine   Tablet 10 milliGRAM(s) Oral daily  aspirin  chewable 81 milliGRAM(s) Oral daily  atorvastatin 40 milliGRAM(s) Oral at bedtime  calcium carbonate 1250 mG + Vitamin D (OsCal 500 + D) 1 Tablet(s) Oral daily  cholecalciferol 1000 Unit(s) Oral daily  clopidogrel Tablet 75 milliGRAM(s) Oral daily  docusate sodium 100 milliGRAM(s) Oral two times a day  ergocalciferol 38555 Unit(s) Oral <User Schedule>  escitalopram 5 milliGRAM(s) Oral daily  famotidine    Tablet 20 milliGRAM(s) Oral daily  ferrous    sulfate 325 milliGRAM(s) Oral daily  gabapentin 100 milliGRAM(s) Oral three times a day  heparin  Injectable 5000 Unit(s) SubCutaneous every 8 hours  insulin glargine Injectable (LANTUS) 15 Unit(s) SubCutaneous at bedtime  insulin lispro (HumaLOG) corrective regimen sliding scale   SubCutaneous three times a day before meals  insulin lispro Injectable (HumaLOG) 4 Unit(s) SubCutaneous three times a day before meals  metoprolol tartrate 50 milliGRAM(s) Oral daily  sodium chloride 0.9%. 1000 milliLiter(s) (65 mL/Hr) IV Continuous <Continuous>    MEDICATIONS  (PRN):  acetaminophen   Tablet 650 milliGRAM(s) Oral every 6 hours PRN mild pain (1-3) or fever (>100.4)      __________________________________________________  REVIEW OF SYSTEMS:    CONSTITUTIONAL: No fever,   EYES: no acute visual disturbances  NECK: No pain or stiffness  RESPIRATORY: No cough; No shortness of breath  CARDIOVASCULAR: No chest pain, no palpitations  GASTROINTESTINAL: No pain. No nausea or vomiting; No diarrhea   NEUROLOGICAL: No headache or numbness, no tremors  MUSCULOSKELETAL: No joint pain, no muscle pain  GENITOURINARY: no dysuria, no frequency, no hesitancy  PSYCHIATRY: no depression , no anxiety  ALL OTHER  ROS negative        Vital Signs Last 24 Hrs  T(C): 36.8 (05 Jun 2018 16:06), Max: 36.8 (05 Jun 2018 16:06)  T(F): 98.3 (05 Jun 2018 16:06), Max: 98.3 (05 Jun 2018 16:06)  HR: 74 (05 Jun 2018 16:06) (63 - 74)  BP: 138/65 (05 Jun 2018 16:06) (118/49 - 161/60)  BP(mean): --  RR: 16 (05 Jun 2018 16:06) (16 - 18)  SpO2: 97% (05 Jun 2018 16:06) (95% - 98%)    ________________________________________________  PHYSICAL EXAM:  GENERAL: NAD  HEENT: Normocephalic;  conjunctivae and sclerae clear; moist mucous membranes;   NECK : supple  CHEST/LUNG: Clear to auscultation bilaterally with good air entry   HEART: S1 S2  regular; no murmurs, gallops or rubs  ABDOMEN: Soft, Nontender, Nondistended; Bowel sounds present  EXTREMITIES: no cyanosis; no edema; no calf tenderness  SKIN: warm and dry; no rash  NERVOUS SYSTEM:  Awake and alert; Oriented  to place, person and time ; no new deficits    _________________________________________________  LABS:                        11.5   5.0   )-----------( 228      ( 04 Jun 2018 08:43 )             37.3     06-05    138  |  103  |  36<H>  ----------------------------<  215<H>  4.1   |  29  |  1.44<H>    Ca    9.0      05 Jun 2018 07:55  Phos  2.9     06-04  Mg     1.9     06-04    TPro  6.6  /  Alb  3.5  /  TBili  0.2  /  DBili  x   /  AST  16  /  ALT  18  /  AlkPhos  24<L>  06-04    PT/INR - ( 04 Jun 2018 08:43 )   PT: 10.8 sec;   INR: 0.99 ratio             CAPILLARY BLOOD GLUCOSE      POCT Blood Glucose.: 110 mg/dL (05 Jun 2018 16:51)  POCT Blood Glucose.: 375 mg/dL (05 Jun 2018 12:13)  POCT Blood Glucose.: 209 mg/dL (05 Jun 2018 07:42)  POCT Blood Glucose.: 221 mg/dL (04 Jun 2018 21:26)        RADIOLOGY & ADDITIONAL TESTS:    Consultant(s) Notes Reviewed:   YES    Care Discussed with Consultants :     Plan of care was discussed with patient and /or primary care giver; all questions and concerns were addressed and care was aligned with patient's wishes.

## 2018-06-05 NOTE — PROGRESS NOTE ADULT - SUBJECTIVE AND OBJECTIVE BOX
Patient seen and examined.   Time of visit:    MEDICATIONS  (STANDING):  amLODIPine   Tablet 10 milliGRAM(s) Oral daily  aspirin  chewable 81 milliGRAM(s) Oral daily  atorvastatin 40 milliGRAM(s) Oral at bedtime  calcium carbonate 1250 mG + Vitamin D (OsCal 500 + D) 1 Tablet(s) Oral daily  cholecalciferol 1000 Unit(s) Oral daily  clopidogrel Tablet 75 milliGRAM(s) Oral daily  docusate sodium 100 milliGRAM(s) Oral two times a day  ergocalciferol 27761 Unit(s) Oral <User Schedule>  escitalopram 5 milliGRAM(s) Oral daily  famotidine    Tablet 20 milliGRAM(s) Oral daily  ferrous    sulfate 325 milliGRAM(s) Oral daily  gabapentin 100 milliGRAM(s) Oral three times a day  heparin  Injectable 5000 Unit(s) SubCutaneous every 8 hours  insulin glargine Injectable (LANTUS) 15 Unit(s) SubCutaneous at bedtime  insulin lispro (HumaLOG) corrective regimen sliding scale   SubCutaneous three times a day before meals  insulin lispro Injectable (HumaLOG) 4 Unit(s) SubCutaneous three times a day before meals  metoprolol tartrate 50 milliGRAM(s) Oral daily      MEDICATIONS  (PRN):  acetaminophen   Tablet 650 milliGRAM(s) Oral every 6 hours PRN mild pain (1-3) or fever (>100.4)   Medications up to date at time of exam.      PHYSICAL EXAMINATION:  Patient has no new complaints.  GENERAL: The patient is a well-developed, well-nourished, in no apparent distress.     Vital Signs Last 24 Hrs  T(C): 36.4 (05 Jun 2018 11:35), Max: 36.9 (04 Jun 2018 15:25)  T(F): 97.6 (05 Jun 2018 11:35), Max: 98.5 (04 Jun 2018 15:25)  HR: 69 (05 Jun 2018 11:35) (63 - 73)  BP: 118/49 (05 Jun 2018 11:35) (118/49 - 161/60)  BP(mean): --  RR: 16 (05 Jun 2018 11:35) (16 - 18)  SpO2: 98% (05 Jun 2018 11:35) (95% - 98%)   (if applicable)      HEENT: Head is normocephalic and atraumatic.     NECK: Supple, no palpable adenopathy.    LUNGS: Clear to auscultation, no wheezing, rales, or rhonchi.    HEART: Regular rate and rhythm without murmur.    ABDOMEN: Soft, nontender, and nondistended.  No hepatosplenomegaly is noted.    EXTREMITIES: Without any cyanosis, clubbing, rash, lesions or edema.    NEUROLOGIC: Awake, alert.    SKIN: Warm, dry, good turgor.      LABS:                        11.5   5.0   )-----------( 228      ( 04 Jun 2018 08:43 )             37.3     06-05    138  |  103  |  36<H>  ----------------------------<  215<H>  4.1   |  29  |  1.44<H>    Ca    9.0      05 Jun 2018 07:55  Phos  2.9     06-04  Mg     1.9     06-04    TPro  6.6  /  Alb  3.5  /  TBili  0.2  /  DBili  x   /  AST  16  /  ALT  18  /  AlkPhos  24<L>  06-04    PT/INR - ( 04 Jun 2018 08:43 )   PT: 10.8 sec;   INR: 0.99 ratio             MICROBIOLOGY: (if applicable)    RADIOLOGY & ADDITIONAL STUDIES:  EKG:   CXR:  ECHO:  < from: Transthoracic Echocardiogram (06.04.18 @ 06:40) >    Patient name: ALEXIS HERNANDEZ  YOB: 1932   Age: 85 (F)   MR#: [id number]  Study Date: 6/4/2018  Location: [address] [place], [place] [address]Sonographer: Miriam Morales [facility]  Study quality: Technically good  Referring Physician:  ELIZABETH WALTER MD  Blood Pressure: 148/69 mmHg  Height: 160 cm  Weight: 63 kg  BSA: 1.7 m2  ------------------------------------------------------------------------    PROCEDURE: Transthoracic echocardiogram with 2-D, M-Mode  and complete spectral and color flow Doppler.  INDICATION:Hypertensive heart disease without heart  failure (I11.9)  HISTORY:  ------------------------------------------------------------------------  DIMENSIONS:  Dimensions:     Normal Values:  LA:     4.3 cm    2.0 - 4.0 cm  Ao:     3.4 cm    2.0 - 3.8 cm  SEPTUM: 0.9 cm    0.6 - 1.2 cm  PWT:    0.8 cm    0.6 - 1.1 cm  LVIDd:  5.0 cm    3.0 - 5.6 cm  LVIDs:  3.2 cm    1.8 - 4.0 cm      Derived Variables:  LVMI: 88 g/m2  RWT: 0.32  Ejection Fraction Visual Estimate: 55-60 %    ------------------------------------------------------------------------  OBSERVATIONS:  Mitral Valve: Mitral annular calcification. Mild mitral  regurgitation.  Aortic Root: Aortic Root: 3.4 cm.    Aortic Valve: Calcified trileaflet aortic valve with normal  opening. Mild aortic insufficiency.  Left Atrium: Mild left atrial enlargement.  Left Ventricle: Normal Left Ventricular Systolic Function,  (EF = 55 to 60%) Normal left ventricular internal  dimensions and wall thicknesses. Grade II diastolic  dysfunction.  Right Heart: Normal right atrium. Normal right ventricular  size and function. Normal tricuspid valve. There is mild  pulmonic regurgitation.  Pericardium/PleuraNormal pericardium with no pericardial  effusion.  Hemodynamic: RV systolic pressure is 31 mm Hg.  ------------------------------------------------------------------------  CONCLUSIONS:  1. Mitral annular calcification. Mild mitral regurgitation.    2. Calcified trileaflet aortic valve with normal opening.  Mild aortic insufficiency.  3. Aortic Root: 3.4 cm.    4. Mild left atrial enlargement.  5. Normal left ventricular internal dimensions and wall  thicknesses.  6. Normal Left Ventricular Systolic Function,  (EF = 55 to  60%)  7. Grade II diastolic dysfunction.  8. Normal right atrium.  9. Normal right ventricular size and function.  10. RV systolic pressure is 31 mm Hg.  11. Normal tricuspid valve.  12. There is mild pulmonic regurgitation.  13. Normal pericardium with no pericardial effusion.    ------------------------------------------------------------------------  Confirmed on  6/5/2018 - 07:49:18 by Lesly Montes MD  ------------------------------------------------------------------------    < end of copied text >      < from: US Duplex Carotid Arteries Complete, Bilateral (06.04.18 @ 14:27) >    EXAM:  US DPLX CAROTIDS COMPL BI                            PROCEDURE DATE:  06/04/2018          INTERPRETATION:  Carotid duplex Doppler ultrasound    Indication: CVA. Transient ischemic attack    Carotid duplex Doppler ultrasound is performed. Grayscale ultrasound   demonstrates atherosclerotic plaques in the left carotid bulb and   proximal right internal carotid artery. The flow velocity measurements   during peak systolic phase in centimeters per second are as the following:    Right CCA 58, ICA 81, ECA 40.  Left CCA 70, , ECA 82.    The end diastolic velocity measurement for the right ICA is 17, and  for   the left ICA is 18.    The peak systolic ICA/CCA velocity ratio on the right is 1.4, and on the   left is 2.0.    The right vertebral artery maintains normal antegrade flow direction. The   left vertebral artery is not visualized.    Impression: Moderate (50-69%) stenosis in the left internal carotid   artery by velocity criteria.    No hemodynamically significant stenosisin the right internal carotid   artery by velocity criteria.                    MAHAD LINO M.D., ATTENDING RADIOLOGIST  This document has been electronically signed. Jun 4 2018  2:36PM                < end of copied text >      IMPRESSION: 85y Female PAST MEDICAL & SURGICAL HISTORY:  TIA (transient ischemic attack)  Myositis: on prednisone x 5 years  Osteoarthritis  HTN (hypertension)  Diabetes  No significant past surgical history         Patient is a 85F from home, AAOx2 at baseline, ambulates with cane and walker, lives with daughter, with PMH NIDDM, HTN, dementia, b/l femoral stent and 1 cardiac stent placed 8 years ago, iron deficiency anemia, presenting with episode of confusion at 9pm yesterday while at a family gathering. As per daughter, when family members left, patient asked when she is able to go home, but was already home. Also, earlier in the day, daughter took patient's BP which was sbp 210, so was given her medications. Daughter denies that patient had slurred speech, facial droop or weakness, and at baseline does not know the date or year, but that this episode was new for the patient. Denies fever, chills, SOB, palpitations, chest pain, nausea, vomiting, diarrhea or constipation. Patient was brought to the ED at 10:48pm, code stroke was called, however, as per daughter, patient was back to her baseline and NIHSS score was 0. Patient was seen and examined, ROS only positive for fatigue otherwise negative. Reports feeling well.    RECOMMENDATION:  Blood pressure is much improved now. Patient w/ moderate stenosis of L ICA, vascular follow up.  Would continue with present medications. Echo shows normal LV function, Grade II DD. Patient's symptoms much improved.

## 2018-06-05 NOTE — PROGRESS NOTE ADULT - PROBLEM SELECTOR PLAN 6
patient slight elevated the creatinine   likely dehydration   will give fluids   nephrology consulted   monitor BMP in am

## 2018-06-05 NOTE — PROGRESS NOTE ADULT - SUBJECTIVE AND OBJECTIVE BOX
[facility] NEPHROLOGY- PROGRESS NOTE    Patient is a 86yo Female with DM type 2, HTN, CKD-3, Dementia, CAD s/p stent, PVD s/p b/l femoral stent, Iron deficiency anemia, p/w AMS. Renal consulted for Elevated serum creatinine.    Hospital Medications: Medications reviewed.  REVIEW OF SYSTEMS: Limited ROS- pt c/o chronic HA. Denies any SOB or chest pain.     VITALS:  T(F): 98.3 (18 @ 16:06), Max: 98.3 (18 @ 16:06)  HR: 74 (18 @ 16:06)  BP: 138/65 (18 @ 16:06)  RR: 16 (18 @ 16:06)  SpO2: 97% (18 @ 16:06)  Wt(kg): --     @ 07:01  -   @ 17:04  --------------------------------------------------------  IN: 175 mL / OUT: 0 mL / NET: 175 mL      PHYSICAL EXAM:  Constitutional: NAD  Neurological: Awake and Alert  HEENT: anicteric sclera,   Respiratory: CTAB, no wheezes, rales or rhonchi  Cardiovascular: S1, S2, RRR  Gastrointestinal: BS+, soft, NT/ND  : No juan.  Extremities: No peripheral edema    LABS:      138  |  103  |  36<H>  ----------------------------<  215<H>  4.1   |  29  |  1.44<H>    Ca    9.0      2018 07:55  Phos  2.9       Mg     1.9         TPro  6.6  /  Alb  3.5  /  TBili  0.2  /  DBili      /  AST  16  /  ALT  18  /  AlkPhos  24<L>      Creatinine Trend: 1.44 <--, 1.15 <--, 1.14 <--, 1.35 <--                        11.5   5.0   )-----------( 228      ( 2018 08:43 )             37.3     Urine Studies:  Urinalysis Basic - ( 2018 01:46 )    Color: Yellow / Appearance: Clear / S.010 / pH:   Gluc:  / Ketone: Negative  / Bili: Negative / Urobili: Negative   Blood:  / Protein: 30 mg/dL / Nitrite: Negative   Leuk Esterase: Trace / RBC: 0-2 /HPF / WBC 3-5 /HPF   Sq Epi:  / Non Sq Epi: Few /HPF / Bacteria: Few /HPF      Osmolality, Random Urine: 253 mos/kg ( @ 01:46)  Sodium, Random Urine: 68 mmol/L ( @ 01:46)

## 2018-06-05 NOTE — PROGRESS NOTE ADULT - SUBJECTIVE AND OBJECTIVE BOX
Time of Visit:  Patient seen and examined.     MEDICATIONS  (STANDING):  amLODIPine   Tablet 10 milliGRAM(s) Oral daily  aspirin  chewable 81 milliGRAM(s) Oral daily  atorvastatin 40 milliGRAM(s) Oral at bedtime  calcium carbonate 1250 mG + Vitamin D (OsCal 500 + D) 1 Tablet(s) Oral daily  cholecalciferol 1000 Unit(s) Oral daily  clopidogrel Tablet 75 milliGRAM(s) Oral daily  docusate sodium 100 milliGRAM(s) Oral two times a day  ergocalciferol 70628 Unit(s) Oral <User Schedule>  escitalopram 5 milliGRAM(s) Oral daily  famotidine    Tablet 20 milliGRAM(s) Oral daily  ferrous    sulfate 325 milliGRAM(s) Oral daily  gabapentin 100 milliGRAM(s) Oral three times a day  heparin  Injectable 5000 Unit(s) SubCutaneous every 8 hours  insulin glargine Injectable (LANTUS) 15 Unit(s) SubCutaneous at bedtime  insulin lispro (HumaLOG) corrective regimen sliding scale   SubCutaneous three times a day before meals  insulin lispro Injectable (HumaLOG) 4 Unit(s) SubCutaneous three times a day before meals  metoprolol tartrate 50 milliGRAM(s) Oral daily      MEDICATIONS  (PRN):  acetaminophen   Tablet 650 milliGRAM(s) Oral every 6 hours PRN mild pain (1-3) or fever (>100.4)       Medications up to date at time of exam.    ROS: No fever, chills, cough, congestion, SOB.  PHYSICAL EXAMINATION:    Vital Signs Last 24 Hrs  T(C): 36.4 (05 Jun 2018 11:35), Max: 36.9 (04 Jun 2018 15:25)  T(F): 97.6 (05 Jun 2018 11:35), Max: 98.5 (04 Jun 2018 15:25)  HR: 69 (05 Jun 2018 11:35) (63 - 73)  BP: 118/49 (05 Jun 2018 11:35) (118/49 - 161/60)  BP(mean): --  RR: 16 (05 Jun 2018 11:35) (16 - 18)  SpO2: 98% (05 Jun 2018 11:35) (95% - 98%)   (if applicable)    General; Alert and oriented. No acute distress.     HEENT: Head is normocephalic and atraumatic. Extraocular muscles are intact. Moist mucosa.     NECK: Supple, no palpable adenopathy.    LUNGS: Clear to auscultation, no wheezing, rales, or rhonchi. No use of accessory muscle.     HEART: S1 S2 Regular rate and no click/ rub.    ABDOMEN: Soft, nontender, and nondistended.  No hepatosplenomegaly is noted.    EXTREMITIES: Without any cyanosis, clubbing, rash, lesions or edema.    NEUROLOGIC: Awake, alert, oriented.     SKIN: Warm and moist. Non diaphoretic.        LABS:                        11.5   5.0   )-----------( 228      ( 04 Jun 2018 08:43 )             37.3     06-05    138  |  103  |  36<H>  ----------------------------<  215<H>  4.1   |  29  |  1.44<H>    Ca    9.0      05 Jun 2018 07:55  Phos  2.9     06-04  Mg     1.9     06-04    TPro  6.6  /  Alb  3.5  /  TBili  0.2  /  DBili  x   /  AST  16  /  ALT  18  /  AlkPhos  24<L>  06-04    PT/INR - ( 04 Jun 2018 08:43 )   PT: 10.8 sec;   INR: 0.99 ratio         RADIOLOGY & ADDITIONAL STUDIES:  EKG:   CXR: < from: Xray Chest 1 View AP/PA (06.02.18 @ 23:53) >    INTERPRETATION:  PORTABLE CHEST X-RAY    HISTORY: AMS.    COMPARISON: 1/29/2017.    FINDINGS:  Multiple EKG leads overlie the chest.  Mild bibasilar atelectasis.  No focal lung consolidation.  No pneumothorax or pleural effusion.   The cardiac silhouette is not accurately assessed by AP technique. Aortic   calcifications.    IMPRESSION:  Mild bibasilar atelectasis.        PAST MEDICAL & SURGICAL HISTORY:  TIA (transient ischemic attack)  Myositis: on prednisone x 5 years  Osteoarthritis  HTN (hypertension)  Diabetes  No significant past surgical history     Impression; 84 Y/O Female with prior mentioned multiple chronic conditions. Presented with confusion  and also had  took by her daughter , so was given her medications a/w generalized weakness, sob on exertion, malaise.+encephalopathy unclear etiology. CXR showed + Atelectasis. +HARMON most likely due to deconditioning. BLD Cx x 2 negative.    Suggestion;  Oxygen supplementation if needed to keep O 2saturation >90%. O2 saturation 96%.     DVT and GI prophylaxis.   PT/OT eval . Time of Visit:  Patient seen and examined.     MEDICATIONS  (STANDING):  amLODIPine   Tablet 10 milliGRAM(s) Oral daily  aspirin  chewable 81 milliGRAM(s) Oral daily  atorvastatin 40 milliGRAM(s) Oral at bedtime  calcium carbonate 1250 mG + Vitamin D (OsCal 500 + D) 1 Tablet(s) Oral daily  cholecalciferol 1000 Unit(s) Oral daily  clopidogrel Tablet 75 milliGRAM(s) Oral daily  docusate sodium 100 milliGRAM(s) Oral two times a day  ergocalciferol 76775 Unit(s) Oral <User Schedule>  escitalopram 5 milliGRAM(s) Oral daily  famotidine    Tablet 20 milliGRAM(s) Oral daily  ferrous    sulfate 325 milliGRAM(s) Oral daily  gabapentin 100 milliGRAM(s) Oral three times a day  heparin  Injectable 5000 Unit(s) SubCutaneous every 8 hours  insulin glargine Injectable (LANTUS) 15 Unit(s) SubCutaneous at bedtime  insulin lispro (HumaLOG) corrective regimen sliding scale   SubCutaneous three times a day before meals  insulin lispro Injectable (HumaLOG) 4 Unit(s) SubCutaneous three times a day before meals  metoprolol tartrate 50 milliGRAM(s) Oral daily      MEDICATIONS  (PRN):  acetaminophen   Tablet 650 milliGRAM(s) Oral every 6 hours PRN mild pain (1-3) or fever (>100.4)       Medications up to date at time of exam.    ROS: No fever, chills, cough, congestion, SOB.  PHYSICAL EXAMINATION:    Vital Signs Last 24 Hrs  T(C): 36.4 (05 Jun 2018 11:35), Max: 36.9 (04 Jun 2018 15:25)  T(F): 97.6 (05 Jun 2018 11:35), Max: 98.5 (04 Jun 2018 15:25)  HR: 69 (05 Jun 2018 11:35) (63 - 73)  BP: 118/49 (05 Jun 2018 11:35) (118/49 - 161/60)  BP(mean): --  RR: 16 (05 Jun 2018 11:35) (16 - 18)  SpO2: 98% (05 Jun 2018 11:35) (95% - 98%)   (if applicable)    General; Alert and oriented. No acute distress.     HEENT: Head is normocephalic and atraumatic. Extraocular muscles are intact. Moist mucosa.     NECK: Supple, no palpable adenopathy.    LUNGS: Clear to auscultation, no wheezing, rales, or rhonchi. No use of accessory muscle.     HEART: S1 S2 Regular rate and no click/ rub.    ABDOMEN: Soft, nontender, and nondistended.  No hepatosplenomegaly is noted.    EXTREMITIES: Without any cyanosis, clubbing, rash, lesions or edema.    NEUROLOGIC: Awake, alert, oriented.     SKIN: Warm and moist. Non diaphoretic.        LABS:                        11.5   5.0   )-----------( 228      ( 04 Jun 2018 08:43 )             37.3     06-05    138  |  103  |  36<H>  ----------------------------<  215<H>  4.1   |  29  |  1.44<H>    Ca    9.0      05 Jun 2018 07:55  Phos  2.9     06-04  Mg     1.9     06-04    TPro  6.6  /  Alb  3.5  /  TBili  0.2  /  DBili  x   /  AST  16  /  ALT  18  /  AlkPhos  24<L>  06-04    PT/INR - ( 04 Jun 2018 08:43 )   PT: 10.8 sec;   INR: 0.99 ratio         RADIOLOGY & ADDITIONAL STUDIES:  EKG:   CXR: < from: Xray Chest 1 View AP/PA (06.02.18 @ 23:53) >    INTERPRETATION:  PORTABLE CHEST X-RAY    HISTORY: AMS.    COMPARISON: 1/29/2017.    FINDINGS:  Multiple EKG leads overlie the chest.  Mild bibasilar atelectasis.  No focal lung consolidation.  No pneumothorax or pleural effusion.   The cardiac silhouette is not accurately assessed by AP technique. Aortic   calcifications.    IMPRESSION:  Mild bibasilar atelectasis.        PAST MEDICAL & SURGICAL HISTORY:  TIA (transient ischemic attack)  Myositis: on prednisone x 5 years  Osteoarthritis  HTN (hypertension)  Diabetes  No significant past surgical history     Impression; 84 Y/O Female with prior mentioned multiple chronic conditions. Presented with confusion  and also had  took by her daughter , so was given her medications a/w generalized weakness, sob on exertion, malaise.+encephalopathy unclear etiology. CXR showed + Atelectasis. +HARMON most likely due to deconditioning. BLD Cx x 2 negative.    Suggestion;  Oxygen supplementation if needed to keep O 2saturation >90%. O2 saturation 96%.     DVT and GI prophylaxis.   PT/OT eval .  Agree with above assessment and plan as transcribed.

## 2018-06-05 NOTE — PROGRESS NOTE ADULT - SUBJECTIVE AND OBJECTIVE BOX
Interval Events:  pt in nad    Allergies    No Known Allergies    Intolerances      Endocrine/Metabolic Medications:  atorvastatin 40 milliGRAM(s) Oral at bedtime  fenofibrate Tablet 145 milliGRAM(s) Oral daily  insulin glargine Injectable (LANTUS) 10 Unit(s) SubCutaneous at bedtime  insulin lispro (HumaLOG) corrective regimen sliding scale   SubCutaneous three times a day before meals      Vital Signs Last 24 Hrs  T(C): 36.7 (05 Jun 2018 07:53), Max: 36.9 (04 Jun 2018 15:25)  T(F): 98 (05 Jun 2018 07:53), Max: 98.5 (04 Jun 2018 15:25)  HR: 63 (05 Jun 2018 07:53) (57 - 73)  BP: 119/49 (05 Jun 2018 07:53) (119/49 - 161/60)  BP(mean): --  RR: 18 (05 Jun 2018 07:53) (16 - 18)  SpO2: 95% (05 Jun 2018 07:53) (95% - 98%)      PHYSICAL EXAM  All physical exam findings normal, except those marked:  General:	Alert, active, cooperative, NAD, well hydrated  .		[] Abnormal:  Neck		Normal: supple, no cervical adenopathy, no palpable thyroid  .		[] Abnormal:  Cardiovascular	Normal: regular rate, normal S1, S2, no murmurs  .		[] Abnormal:  Respiratory	Normal: no chest wall deformity, normal respiratory pattern, CTA B/L  .		[] Abnormal:  Abdominal	Normal: soft, ND, NT, bowel sounds present, no masses, no organomegaly  .		[] Abnormal:  		Normal normal genitalia, testes descended, circumcised/uncircumcised  .		Asha stage:			Breast asha:  .		Menstrual history:  .		[] Abnormal:  Extremities	Normal: FROM x4  .		[] Abnormal:  Skin		Normal: intact and not indurated, no rash, no acanthosis nigricans  .		[] Abnormal:  Neurologic	Normal: grossly intact  .		[] Abnormal:    LABS                              138    |  103    |  36                  Calcium: 9.0   / iCa: x      (06-05 @ 07:55)    ----------------------------<  215       Magnesium: x                                4.1     |  29     |  1.44             Phosphorous: x          CAPILLARY BLOOD GLUCOSE      POCT Blood Glucose.: 209 mg/dL (05 Jun 2018 07:42)  POCT Blood Glucose.: 221 mg/dL (04 Jun 2018 21:26)  POCT Blood Glucose.: 142 mg/dL (04 Jun 2018 16:05)  POCT Blood Glucose.: 464 mg/dL (04 Jun 2018 11:51)        Assesment/plan   dm- remains un cont   change lantus to 15 units   add humalog 4 ac tid  change to oral dm meds upon d/c  fsg ac and hs  d/w hs

## 2018-06-06 ENCOUNTER — TRANSCRIPTION ENCOUNTER (OUTPATIENT)
Age: 83
End: 2018-06-06

## 2018-06-06 VITALS
RESPIRATION RATE: 17 BRPM | OXYGEN SATURATION: 100 % | SYSTOLIC BLOOD PRESSURE: 150 MMHG | TEMPERATURE: 97 F | HEART RATE: 66 BPM | DIASTOLIC BLOOD PRESSURE: 64 MMHG

## 2018-06-06 DIAGNOSIS — R69 ILLNESS, UNSPECIFIED: ICD-10-CM

## 2018-06-06 LAB
ANION GAP SERPL CALC-SCNC: 6 MMOL/L — SIGNIFICANT CHANGE UP (ref 5–17)
BASOPHILS # BLD AUTO: 0.1 K/UL — SIGNIFICANT CHANGE UP (ref 0–0.2)
BASOPHILS NFR BLD AUTO: 1.8 % — SIGNIFICANT CHANGE UP (ref 0–2)
BUN SERPL-MCNC: 36 MG/DL — HIGH (ref 7–18)
CALCIUM SERPL-MCNC: 9.2 MG/DL — SIGNIFICANT CHANGE UP (ref 8.4–10.5)
CHLORIDE SERPL-SCNC: 103 MMOL/L — SIGNIFICANT CHANGE UP (ref 96–108)
CO2 SERPL-SCNC: 30 MMOL/L — SIGNIFICANT CHANGE UP (ref 22–31)
CREAT SERPL-MCNC: 1.22 MG/DL — SIGNIFICANT CHANGE UP (ref 0.5–1.3)
EOSINOPHIL # BLD AUTO: 0.2 K/UL — SIGNIFICANT CHANGE UP (ref 0–0.5)
EOSINOPHIL NFR BLD AUTO: 4.4 % — SIGNIFICANT CHANGE UP (ref 0–6)
GLUCOSE BLDC GLUCOMTR-MCNC: 117 MG/DL — HIGH (ref 70–99)
GLUCOSE BLDC GLUCOMTR-MCNC: 136 MG/DL — HIGH (ref 70–99)
GLUCOSE SERPL-MCNC: 124 MG/DL — HIGH (ref 70–99)
HCT VFR BLD CALC: 34.6 % — SIGNIFICANT CHANGE UP (ref 34.5–45)
HGB BLD-MCNC: 11 G/DL — LOW (ref 11.5–15.5)
LYMPHOCYTES # BLD AUTO: 1.4 K/UL — SIGNIFICANT CHANGE UP (ref 1–3.3)
LYMPHOCYTES # BLD AUTO: 32 % — SIGNIFICANT CHANGE UP (ref 13–44)
MAGNESIUM SERPL-MCNC: 2 MG/DL — SIGNIFICANT CHANGE UP (ref 1.6–2.6)
MCHC RBC-ENTMCNC: 26 PG — LOW (ref 27–34)
MCHC RBC-ENTMCNC: 31.7 GM/DL — LOW (ref 32–36)
MCV RBC AUTO: 82.1 FL — SIGNIFICANT CHANGE UP (ref 80–100)
MONOCYTES # BLD AUTO: 0.4 K/UL — SIGNIFICANT CHANGE UP (ref 0–0.9)
MONOCYTES NFR BLD AUTO: 8.8 % — SIGNIFICANT CHANGE UP (ref 2–14)
NEUTROPHILS # BLD AUTO: 2.3 K/UL — SIGNIFICANT CHANGE UP (ref 1.8–7.4)
NEUTROPHILS NFR BLD AUTO: 53 % — SIGNIFICANT CHANGE UP (ref 43–77)
PHOSPHATE SERPL-MCNC: 2.8 MG/DL — SIGNIFICANT CHANGE UP (ref 2.5–4.5)
PLATELET # BLD AUTO: 224 K/UL — SIGNIFICANT CHANGE UP (ref 150–400)
POTASSIUM SERPL-MCNC: 4.1 MMOL/L — SIGNIFICANT CHANGE UP (ref 3.5–5.3)
POTASSIUM SERPL-SCNC: 4.1 MMOL/L — SIGNIFICANT CHANGE UP (ref 3.5–5.3)
RBC # BLD: 4.21 M/UL — SIGNIFICANT CHANGE UP (ref 3.8–5.2)
RBC # FLD: 13 % — SIGNIFICANT CHANGE UP (ref 10.3–14.5)
SODIUM SERPL-SCNC: 139 MMOL/L — SIGNIFICANT CHANGE UP (ref 135–145)
WBC # BLD: 4.3 K/UL — SIGNIFICANT CHANGE UP (ref 3.8–10.5)
WBC # FLD AUTO: 4.3 K/UL — SIGNIFICANT CHANGE UP (ref 3.8–10.5)

## 2018-06-06 PROCEDURE — 85027 COMPLETE CBC AUTOMATED: CPT

## 2018-06-06 PROCEDURE — 82607 VITAMIN B-12: CPT

## 2018-06-06 PROCEDURE — 82746 ASSAY OF FOLIC ACID SERUM: CPT

## 2018-06-06 PROCEDURE — 83036 HEMOGLOBIN GLYCOSYLATED A1C: CPT

## 2018-06-06 PROCEDURE — 80061 LIPID PANEL: CPT

## 2018-06-06 PROCEDURE — 85610 PROTHROMBIN TIME: CPT

## 2018-06-06 PROCEDURE — 81001 URINALYSIS AUTO W/SCOPE: CPT

## 2018-06-06 PROCEDURE — 82140 ASSAY OF AMMONIA: CPT

## 2018-06-06 PROCEDURE — 85730 THROMBOPLASTIN TIME PARTIAL: CPT

## 2018-06-06 PROCEDURE — 71045 X-RAY EXAM CHEST 1 VIEW: CPT

## 2018-06-06 PROCEDURE — 84443 ASSAY THYROID STIM HORMONE: CPT

## 2018-06-06 PROCEDURE — 99291 CRITICAL CARE FIRST HOUR: CPT | Mod: 25

## 2018-06-06 PROCEDURE — 82306 VITAMIN D 25 HYDROXY: CPT

## 2018-06-06 PROCEDURE — 83935 ASSAY OF URINE OSMOLALITY: CPT

## 2018-06-06 PROCEDURE — 80053 COMPREHEN METABOLIC PANEL: CPT

## 2018-06-06 PROCEDURE — 83735 ASSAY OF MAGNESIUM: CPT

## 2018-06-06 PROCEDURE — 93005 ELECTROCARDIOGRAM TRACING: CPT

## 2018-06-06 PROCEDURE — 93880 EXTRACRANIAL BILAT STUDY: CPT

## 2018-06-06 PROCEDURE — 87040 BLOOD CULTURE FOR BACTERIA: CPT

## 2018-06-06 PROCEDURE — 93306 TTE W/DOPPLER COMPLETE: CPT

## 2018-06-06 PROCEDURE — 84300 ASSAY OF URINE SODIUM: CPT

## 2018-06-06 PROCEDURE — 84100 ASSAY OF PHOSPHORUS: CPT

## 2018-06-06 PROCEDURE — 70450 CT HEAD/BRAIN W/O DYE: CPT

## 2018-06-06 PROCEDURE — 84156 ASSAY OF PROTEIN URINE: CPT

## 2018-06-06 PROCEDURE — 84484 ASSAY OF TROPONIN QUANT: CPT

## 2018-06-06 PROCEDURE — 82962 GLUCOSE BLOOD TEST: CPT

## 2018-06-06 PROCEDURE — 80048 BASIC METABOLIC PNL TOTAL CA: CPT

## 2018-06-06 PROCEDURE — 97161 PT EVAL LOW COMPLEX 20 MIN: CPT

## 2018-06-06 RX ORDER — DOCUSATE SODIUM 100 MG
1 CAPSULE ORAL
Qty: 60 | Refills: 0
Start: 2018-06-06 | End: 2018-07-05

## 2018-06-06 RX ORDER — FAMOTIDINE 10 MG/ML
1 INJECTION INTRAVENOUS
Qty: 0 | Refills: 0 | COMMUNITY
Start: 2018-06-06

## 2018-06-06 RX ORDER — FERROUS SULFATE 325(65) MG
1 TABLET ORAL
Qty: 0 | Refills: 0 | DISCHARGE
Start: 2018-06-06 | End: 2018-07-05

## 2018-06-06 RX ORDER — FERROUS SULFATE 325(65) MG
1 TABLET ORAL
Qty: 30 | Refills: 0
Start: 2018-06-06 | End: 2018-07-05

## 2018-06-06 RX ORDER — GABAPENTIN 400 MG/1
1 CAPSULE ORAL
Qty: 90 | Refills: 0
Start: 2018-06-06 | End: 2018-07-05

## 2018-06-06 RX ORDER — METOPROLOL TARTRATE 50 MG
1 TABLET ORAL
Qty: 30 | Refills: 0
Start: 2018-06-06 | End: 2018-07-05

## 2018-06-06 RX ORDER — GABAPENTIN 400 MG/1
1 CAPSULE ORAL
Qty: 0 | Refills: 0 | COMMUNITY
Start: 2018-06-06

## 2018-06-06 RX ORDER — ACETAMINOPHEN 500 MG
2 TABLET ORAL
Qty: 15 | Refills: 0
Start: 2018-06-06 | End: 2018-06-12

## 2018-06-06 RX ORDER — ESCITALOPRAM OXALATE 10 MG/1
1 TABLET, FILM COATED ORAL
Qty: 0 | Refills: 0 | COMMUNITY
Start: 2018-06-06

## 2018-06-06 RX ORDER — ACETAMINOPHEN 500 MG
2 TABLET ORAL
Qty: 0 | Refills: 0 | COMMUNITY
Start: 2018-06-06

## 2018-06-06 RX ORDER — CLOPIDOGREL BISULFATE 75 MG/1
1 TABLET, FILM COATED ORAL
Qty: 30 | Refills: 0
Start: 2018-06-06 | End: 2018-07-05

## 2018-06-06 RX ORDER — METOPROLOL TARTRATE 50 MG
1 TABLET ORAL
Qty: 0 | Refills: 0 | DISCHARGE
Start: 2018-06-06 | End: 2018-07-05

## 2018-06-06 RX ORDER — AMLODIPINE BESYLATE 2.5 MG/1
1 TABLET ORAL
Qty: 30 | Refills: 0
Start: 2018-06-06 | End: 2018-07-05

## 2018-06-06 RX ORDER — DOCUSATE SODIUM 100 MG
1 CAPSULE ORAL
Qty: 0 | Refills: 0 | COMMUNITY

## 2018-06-06 RX ORDER — FERROUS SULFATE 325(65) MG
1 TABLET ORAL
Qty: 0 | Refills: 0 | COMMUNITY

## 2018-06-06 RX ORDER — METOPROLOL TARTRATE 50 MG
1 TABLET ORAL
Qty: 0 | Refills: 0 | COMMUNITY
Start: 2018-06-06

## 2018-06-06 RX ORDER — ASPIRIN/CALCIUM CARB/MAGNESIUM 324 MG
1 TABLET ORAL
Qty: 0 | Refills: 0 | COMMUNITY
Start: 2018-06-06

## 2018-06-06 RX ORDER — ESCITALOPRAM OXALATE 10 MG/1
1 TABLET, FILM COATED ORAL
Qty: 0 | Refills: 0 | COMMUNITY

## 2018-06-06 RX ORDER — ESCITALOPRAM OXALATE 10 MG/1
1 TABLET, FILM COATED ORAL
Qty: 30 | Refills: 0
Start: 2018-06-06 | End: 2018-07-05

## 2018-06-06 RX ORDER — METOPROLOL TARTRATE 50 MG
1 TABLET ORAL
Qty: 0 | Refills: 0 | COMMUNITY

## 2018-06-06 RX ORDER — FAMOTIDINE 10 MG/ML
1 INJECTION INTRAVENOUS
Qty: 30 | Refills: 0
Start: 2018-06-06 | End: 2018-07-05

## 2018-06-06 RX ORDER — ASPIRIN/CALCIUM CARB/MAGNESIUM 324 MG
1 TABLET ORAL
Qty: 30 | Refills: 0
Start: 2018-06-06 | End: 2018-07-05

## 2018-06-06 RX ORDER — DOCUSATE SODIUM 100 MG
1 CAPSULE ORAL
Qty: 0 | Refills: 0 | COMMUNITY
Start: 2018-06-06

## 2018-06-06 RX ORDER — GABAPENTIN 400 MG/1
1 CAPSULE ORAL
Qty: 0 | Refills: 0 | COMMUNITY

## 2018-06-06 RX ORDER — CLOPIDOGREL BISULFATE 75 MG/1
1 TABLET, FILM COATED ORAL
Qty: 0 | Refills: 0 | COMMUNITY
Start: 2018-06-06

## 2018-06-06 RX ORDER — AMLODIPINE BESYLATE 2.5 MG/1
1 TABLET ORAL
Qty: 0 | Refills: 0 | COMMUNITY
Start: 2018-06-06

## 2018-06-06 RX ORDER — SITAGLIPTIN AND METFORMIN HYDROCHLORIDE 500; 50 MG/1; MG/1
1 TABLET, FILM COATED ORAL
Qty: 60 | Refills: 0
Start: 2018-06-06 | End: 2018-07-05

## 2018-06-06 RX ORDER — ATORVASTATIN CALCIUM 80 MG/1
1 TABLET, FILM COATED ORAL
Qty: 0 | Refills: 0 | COMMUNITY
Start: 2018-06-06

## 2018-06-06 RX ORDER — ATORVASTATIN CALCIUM 80 MG/1
1 TABLET, FILM COATED ORAL
Qty: 30 | Refills: 0
Start: 2018-06-06 | End: 2018-07-05

## 2018-06-06 RX ADMIN — GABAPENTIN 100 MILLIGRAM(S): 400 CAPSULE ORAL at 15:16

## 2018-06-06 RX ADMIN — HEPARIN SODIUM 5000 UNIT(S): 5000 INJECTION INTRAVENOUS; SUBCUTANEOUS at 15:16

## 2018-06-06 RX ADMIN — Medication 4 UNIT(S): at 11:40

## 2018-06-06 RX ADMIN — GABAPENTIN 100 MILLIGRAM(S): 400 CAPSULE ORAL at 06:07

## 2018-06-06 RX ADMIN — Medication 1 TABLET(S): at 11:39

## 2018-06-06 RX ADMIN — Medication 81 MILLIGRAM(S): at 11:39

## 2018-06-06 RX ADMIN — Medication 325 MILLIGRAM(S): at 11:39

## 2018-06-06 RX ADMIN — HEPARIN SODIUM 5000 UNIT(S): 5000 INJECTION INTRAVENOUS; SUBCUTANEOUS at 06:07

## 2018-06-06 RX ADMIN — AMLODIPINE BESYLATE 10 MILLIGRAM(S): 2.5 TABLET ORAL at 06:07

## 2018-06-06 RX ADMIN — ESCITALOPRAM OXALATE 5 MILLIGRAM(S): 10 TABLET, FILM COATED ORAL at 11:39

## 2018-06-06 RX ADMIN — Medication 50 MILLIGRAM(S): at 06:07

## 2018-06-06 RX ADMIN — Medication 4 UNIT(S): at 08:00

## 2018-06-06 RX ADMIN — Medication 100 MILLIGRAM(S): at 06:07

## 2018-06-06 RX ADMIN — CLOPIDOGREL BISULFATE 75 MILLIGRAM(S): 75 TABLET, FILM COATED ORAL at 11:39

## 2018-06-06 RX ADMIN — FAMOTIDINE 20 MILLIGRAM(S): 10 INJECTION INTRAVENOUS at 11:39

## 2018-06-06 NOTE — DISCHARGE NOTE ADULT - MEDICATION SUMMARY - MEDICATIONS TO STOP TAKING
I will STOP taking the medications listed below when I get home from the hospital:    fenofibrate 145 mg oral tablet  -- 1 tab(s) by mouth once a day    Protonix 40 mg oral delayed release tablet  -- 1 tab(s) by mouth once a day    Lantus 100 units/mL subcutaneous solution  -- 20  subcutaneous once a day (at bedtime)    NovoLOG 100 units/mL subcutaneous solution  -- 15  subcutaneous 3 times a day (before meals)    losartan 50 mg oral tablet  -- 1 tab(s) by mouth once a day    metoprolol tartrate 50 mg oral tablet  -- 1 tab(s) by mouth 2 times a day    hydroCHLOROthiazide 25 mg oral tablet  -- 1 tab(s) by mouth once a day

## 2018-06-06 NOTE — PROGRESS NOTE ADULT - PROBLEM SELECTOR PROBLEM 2
CKD (chronic kidney disease), stage III
CKD (chronic kidney disease), stage III
HTN (hypertension)
CKD (chronic kidney disease), stage III

## 2018-06-06 NOTE — PROGRESS NOTE ADULT - SUBJECTIVE AND OBJECTIVE BOX
Time of Visit:  Patient seen and examined.     MEDICATIONS  (STANDING):  amLODIPine   Tablet 10 milliGRAM(s) Oral daily  aspirin  chewable 81 milliGRAM(s) Oral daily  atorvastatin 40 milliGRAM(s) Oral at bedtime  calcium carbonate 1250 mG + Vitamin D (OsCal 500 + D) 1 Tablet(s) Oral daily  clopidogrel Tablet 75 milliGRAM(s) Oral daily  docusate sodium 100 milliGRAM(s) Oral two times a day  ergocalciferol 21426 Unit(s) Oral <User Schedule>  escitalopram 5 milliGRAM(s) Oral daily  famotidine    Tablet 20 milliGRAM(s) Oral daily  ferrous    sulfate 325 milliGRAM(s) Oral daily  gabapentin 100 milliGRAM(s) Oral three times a day  heparin  Injectable 5000 Unit(s) SubCutaneous every 8 hours  insulin glargine Injectable (LANTUS) 15 Unit(s) SubCutaneous at bedtime  insulin lispro (HumaLOG) corrective regimen sliding scale   SubCutaneous three times a day before meals  insulin lispro Injectable (HumaLOG) 4 Unit(s) SubCutaneous three times a day before meals  metoprolol tartrate 50 milliGRAM(s) Oral daily  sodium chloride 0.9%. 1000 milliLiter(s) (65 mL/Hr) IV Continuous <Continuous>      MEDICATIONS  (PRN):  acetaminophen   Tablet 650 milliGRAM(s) Oral every 6 hours PRN mild pain (1-3) or fever (>100.4)     Medications up to date at time of exam.    PHYSICAL EXAMINATION:  Patient has no new complaints.  GENERAL: The patient is a well-developed, well-nourished, in no apparent distress.     Vital Signs Last 24 Hrs  T(C): 36.6 (06 Jun 2018 08:12), Max: 36.9 (05 Jun 2018 20:40)  T(F): 97.9 (06 Jun 2018 08:12), Max: 98.4 (05 Jun 2018 20:40)  HR: 60 (06 Jun 2018 08:12) (60 - 82)  BP: 141/55 (06 Jun 2018 08:12) (129/56 - 141/55)  BP(mean): --  RR: 18 (06 Jun 2018 08:12) (16 - 18)  SpO2: 100% (06 Jun 2018 08:12) (97% - 100%)   (if applicable)    Chest Tube (if applicable)    HEENT: Head is normocephalic and atraumatic.     NECK: Supple, no palpable adenopathy.    LUNGS: Clear to auscultation, no wheezing, rales, or rhonchi.    HEART: Regular rate and rhythm without murmur.    ABDOMEN: Soft, nontender, and nondistended.      EXTREMITIES: Without any cyanosis, clubbing, rash, lesions or edema.    NEUROLOGIC: Awake, alert.    SKIN: Warm, dry, good turgor.    LABS:                        11.0   4.3   )-----------( 224      ( 06 Jun 2018 07:36 )             34.6     06-06    139  |  103  |  36<H>  ----------------------------<  124<H>  4.1   |  30  |  1.22    Ca    9.2      06 Jun 2018 07:36  Phos  2.8     06-06  Mg     2.0     06-06            MICROBIOLOGY: (if applicable)    RADIOLOGY & ADDITIONAL STUDIES:  EKG:   CXR:  ECHO:    IMPRESSION: 85y Female PAST MEDICAL & SURGICAL HISTORY:  TIA (transient ischemic attack)  Myositis: on prednisone x 5 years  Osteoarthritis  HTN (hypertension)  Diabetes  No significant past surgical history           Impression; 86 Y/O Female with prior mentioned multiple chronic conditions. Presented with confusion  and also had  took by her daughter , so was given her medications a/w generalized weakness, sob on exertion, malaise.    +encephalopathy unclear etiology.   +Atelectasis.   +HARMON most likely due to deconditioning.     Suggestion;  Oxygen supplementation if needed to keep O 2saturation >90%. O2 saturation 96%.     DVT and GI prophylaxis.   PT/OT eval . Time of Visit:  Patient seen and examined.     MEDICATIONS  (STANDING):  amLODIPine   Tablet 10 milliGRAM(s) Oral daily  aspirin  chewable 81 milliGRAM(s) Oral daily  atorvastatin 40 milliGRAM(s) Oral at bedtime  calcium carbonate 1250 mG + Vitamin D (OsCal 500 + D) 1 Tablet(s) Oral daily  clopidogrel Tablet 75 milliGRAM(s) Oral daily  docusate sodium 100 milliGRAM(s) Oral two times a day  ergocalciferol 93335 Unit(s) Oral <User Schedule>  escitalopram 5 milliGRAM(s) Oral daily  famotidine    Tablet 20 milliGRAM(s) Oral daily  ferrous    sulfate 325 milliGRAM(s) Oral daily  gabapentin 100 milliGRAM(s) Oral three times a day  heparin  Injectable 5000 Unit(s) SubCutaneous every 8 hours  insulin glargine Injectable (LANTUS) 15 Unit(s) SubCutaneous at bedtime  insulin lispro (HumaLOG) corrective regimen sliding scale   SubCutaneous three times a day before meals  insulin lispro Injectable (HumaLOG) 4 Unit(s) SubCutaneous three times a day before meals  metoprolol tartrate 50 milliGRAM(s) Oral daily  sodium chloride 0.9%. 1000 milliLiter(s) (65 mL/Hr) IV Continuous <Continuous>      MEDICATIONS  (PRN):  acetaminophen   Tablet 650 milliGRAM(s) Oral every 6 hours PRN mild pain (1-3) or fever (>100.4)     Medications up to date at time of exam.    PHYSICAL EXAMINATION:  Patient has no new complaints.  GENERAL: The patient is a well-developed, well-nourished, in no apparent distress.     Vital Signs Last 24 Hrs  T(C): 36.6 (06 Jun 2018 08:12), Max: 36.9 (05 Jun 2018 20:40)  T(F): 97.9 (06 Jun 2018 08:12), Max: 98.4 (05 Jun 2018 20:40)  HR: 60 (06 Jun 2018 08:12) (60 - 82)  BP: 141/55 (06 Jun 2018 08:12) (129/56 - 141/55)  BP(mean): --  RR: 18 (06 Jun 2018 08:12) (16 - 18)  SpO2: 100% (06 Jun 2018 08:12) (97% - 100%)   (if applicable)    Chest Tube (if applicable)    HEENT: Head is normocephalic and atraumatic.     NECK: Supple, no palpable adenopathy.    LUNGS: Clear to auscultation, no wheezing, rales, or rhonchi.    HEART: Regular rate and rhythm without murmur.    ABDOMEN: Soft, nontender, and nondistended.      EXTREMITIES: Without any cyanosis, clubbing, rash, lesions or edema.    NEUROLOGIC: Awake, alert.    SKIN: Warm, dry, good turgor.    LABS:                        11.0   4.3   )-----------( 224      ( 06 Jun 2018 07:36 )             34.6     06-06    139  |  103  |  36<H>  ----------------------------<  124<H>  4.1   |  30  |  1.22    Ca    9.2      06 Jun 2018 07:36  Phos  2.8     06-06  Mg     2.0     06-06            MICROBIOLOGY: (if applicable)    RADIOLOGY & ADDITIONAL STUDIES:  EKG:   CXR:  ECHO:    IMPRESSION: 85y Female PAST MEDICAL & SURGICAL HISTORY:  TIA (transient ischemic attack)  Myositis: on prednisone x 5 years  Osteoarthritis  HTN (hypertension)  Diabetes  No significant past surgical history           Impression; 84 Y/O Female with prior mentioned multiple chronic conditions. Presented with confusion  and also had  took by her daughter , so was given her medications a/w generalized weakness, sob on exertion, malaise.    +encephalopathy unclear etiology.   +Atelectasis.   +HARMON most likely due to deconditioning.     Suggestion;  Oxygen supplementation if needed to keep O 2saturation >90%. O2 saturation 96%.     DVT and GI prophylaxis.   PT/OT eval .    Agree with above assessment and plan as transcribed.

## 2018-06-06 NOTE — DISCHARGE NOTE ADULT - HOSPITAL COURSE
Patient is a 85F from home, AAOx2 at baseline, ambulates with cane and walker, lives with daughter, with PMH NIDDM, HTN, ckd stage 3, dementia, b/l femoral stent and 1 cardiac stent placed 8 years ago, iron deficiency anemia, presenting with episode of confusion at 9pm yesterday while at a family gathering. As per daughter, when family members left, patient asked when she is able to go home, but was already home. Also, earlier in the day, daughter took patient's BP which was sbp 210, so was given her medications a/w generalized weakness, sob on exertion, malaise. Daughter denies that patient had slurred speech, facial droop, and at baseline does not know the date or year, but that this episode was new for the patient. Denies fever, chills, palpitations, chest pain, nausea, vomiting, diarrhea or constipation. Patient was brought to the ED at 10:48pm, code stroke was called, however, as per daughter, patient was back to her baseline and NIHSS score was 0. Patient was seen and examined, ROS only positive for fatigue otherwise negative. Reports feeling well.    Patient to R/o [place]/ TIA TIA (transient ischemic attack).  Plan: code stroke, NIHSS score 0, back to baseline mental status so no tPA given, no neuro deficit on exam  CT head neg , carotid doppler showed  Moderate (50-69%) stenosis in the left internal carotid artery by velocity criteria. treated with asa, statin,  neurology Dr Hieu scherer  recommended no further neurological workup needed, vascular surgery consulted. vascular recommended outpt for vasc surveillance- repeat US in 6 months.   ECHO wnl   CKD (chronic kidney disease). Plan: patient slight elevated the creatinine likely dehydration treated with IV fluids nephrology Dr Richey evaluated with patient and recommended to hold losartan. renal functions improved     Patient is stable for discharge   Plan of care discussed with patient and the attending physician

## 2018-06-06 NOTE — PROGRESS NOTE ADULT - SUBJECTIVE AND OBJECTIVE BOX
[facility] NEPHROLOGY- PROGRESS NOTE    Patient is a 86yo Female with DM type 2, HTN, CKD-3, Dementia, CAD s/p stent, PVD s/p b/l femoral stent, Iron deficiency anemia, p/w AMS. Renal consulted for Elevated serum creatinine.    Hospital Medications: Medications reviewed.  REVIEW OF SYSTEMS: Limited ROS- pt c/o chronic HA. Denies any SOB or chest pain.     VITALS:  T(F): 97.9 (18 @ 08:12), Max: 98.4 (18 @ 20:40)  HR: 60 (18 @ 08:12)  BP: 141/55 (18 @ 08:12)  RR: 18 (18 @ 08:12)  SpO2: 100% (18 @ 08:12)  Wt(kg): --     @ 07:01  -   @ 07:00  --------------------------------------------------------  IN: 760 mL / OUT: 0 mL / NET: 760 mL      PHYSICAL EXAM:  Constitutional: NAD  Neurological: Awake and Alert  HEENT: anicteric sclera,   Respiratory: CTAB, no wheezes, rales or rhonchi  Cardiovascular: S1, S2, RRR  Gastrointestinal: BS+, soft, NT/ND  : No juan.  Extremities: No peripheral edema      LABS:      139  |  103  |  36<H>  ----------------------------<  124<H>  4.1   |  30  |  1.22    Ca    9.2      2018 07:36  Phos  2.8       Mg     2.0           Creatinine Trend: 1.22 <--, 1.44 <--, 1.15 <--, 1.14 <--, 1.35 <--                        11.0   4.3   )-----------( 224      ( 2018 07:36 )             34.6     Urine Studies:  Urinalysis Basic - ( 2018 01:46 )    Color: Yellow / Appearance: Clear / S.010 / pH:   Gluc:  / Ketone: Negative  / Bili: Negative / Urobili: Negative   Blood:  / Protein: 30 mg/dL / Nitrite: Negative   Leuk Esterase: Trace / RBC: 0-2 /HPF / WBC 3-5 /HPF   Sq Epi:  / Non Sq Epi: Few /HPF / Bacteria: Few /HPF      Osmolality, Random Urine: 253 mos/kg ( @ 01:46)  Sodium, Random Urine: 68 mmol/L ( @ 01:46)

## 2018-06-06 NOTE — PROGRESS NOTE ADULT - PROBLEM SELECTOR PROBLEM 1
KARLY (acute kidney injury)
KARLY (acute kidney injury)
TIA (transient ischemic attack)
KARLY (acute kidney injury)

## 2018-06-06 NOTE — PROGRESS NOTE ADULT - ASSESSMENT
Patient is a 84yo Female with DM type 2, HTN, CKD-3, Dementia, CAD s/p stent, PVD s/p b/l femoral stent, Iron deficiency anemia, p/w AMS. Renal consulted for Elevated serum creatinine.
Patient is a 85F from home, AAOx2 at baseline, ambulates with cane and walker, lives with daughter, with PMH NIDDM, HTN, ckd stage 3, dementia, b/l femoral stent and 1 cardiac stent placed 8 years ago, iron deficiency anemia, presenting with episode of confusion a/w malaise, generalized weakness, sob, and uncontrolled HTN with , with symptoms starting at 9pm yesterday while at a family gathering. Admitted for altered mental status likely 2/2 TIA vs malignant HTN vs less likely worsening dementia.
Patient is a 86yo Female with DM type 2, HTN, CKD-3, Dementia, CAD s/p stent, PVD s/p b/l femoral stent, Iron deficiency anemia, p/w AMS. Renal consulted for Elevated serum creatinine.
Patient is a 86yo Female with DM type 2, HTN, CKD-3, Dementia, CAD s/p stent, PVD s/p b/l femoral stent, Iron deficiency anemia, p/w AMS. Renal consulted for Elevated serum creatinine.

## 2018-06-06 NOTE — PROGRESS NOTE ADULT - PROBLEM SELECTOR PLAN 3
BP controlled. Continue with current anti-hypertensive medications. Monitor BP.
c/w escitalopram  AAOX2, at her baseline upon admission as per daughter
c/w escitalopram  AAOX2, at her baseline upon admission as per daughter
c/w escitalopram  stable  keep environmental settings stable  avoid narcotics

## 2018-06-06 NOTE — DISCHARGE NOTE ADULT - PATIENT PORTAL LINK FT
You can access the [facility] Portal, offered by [facility], by registering with the following website: [email or web address]

## 2018-06-06 NOTE — PROGRESS NOTE ADULT - PROBLEM SELECTOR PROBLEM 4
Altered mental status, unspecified altered mental status type
Diabetes

## 2018-06-06 NOTE — PROGRESS NOTE ADULT - PROBLEM SELECTOR PLAN 1
Mild KARLY hemodynamically mediated. Losartan held and KARLY resolved. Renal function at baseline.   Avoid nephrotoxins.
Mild KARLY likely hemodynamically mediated. Consider trial of IVF. Continue to hold Losartan. Avoid nephrotoxins.
code stroke, NIHSS score 0, back to baseline mental status so no tPA given  no neuro deficit on exam  CT head neg  carotid doppler showed   echo Moderate (50-69%) stenosis in the left internal carotid   artery by velocity criteria.  asa, statin, heparin sq  passed bedside swallow, regular diet  neurology Dr Hieu scherer noted   vascular surgery consulted.   ECHO wnl   vascular recommended outpt for vasc surveillance- repeat US in 6 months
code stroke, NIHSS score 0, back to baseline mental status so no tPA given  no neuro deficit on exam  CT head neg  carotid doppler showed   echo Moderate (50-69%) stenosis in the left internal carotid   artery by velocity criteria.  asa, statin, heparin sq  passed bedside swallow, regular diet  neurology Dr Hieu scherer noted   vascular surgery consulted.  F/u ECHO
stable  neuro w/u pending   CT Head (-) acute pathology   c/w BP optimization  neuro checks  fall precautions  f/u all medical specialist recs
Mild KARLY likely hemodynamically mediated. Improving with IVF. c/w IVF. Encourage PO fluid intake. Continue to hold Losartan. Avoid nephrotoxins.

## 2018-06-06 NOTE — PROGRESS NOTE ADULT - PROBLEM SELECTOR PLAN 2
Baseline SCr 0.9-1.2. Defer secondary w/u for CKD at this time.   Monitor BMP.
Baseline SCr 0.9-1.2. Defer secondary w/u for CKD at this time.   Monitor BMP.
Baseline SCr 0.9-1.2. Renal function at baseline.   Defer secondary w/u for CKD at this time.   Monitor BMP.
malignant htn improving  c/w antihypertensive rx
resuming norvasc 10mg, lopressor 50mg in AM, losartan 50mg daily  monitor BP  BP well controlled
resuming norvasc 10mg, lopressor 50mg in AM, losartan 50mg daily  monitor BP  BP well controlled

## 2018-06-06 NOTE — DISCHARGE NOTE ADULT - MEDICATION SUMMARY - MEDICATIONS TO TAKE
I will START or STAY ON the medications listed below when I get home from the hospital:    acetaminophen 325 mg oral tablet  -- 2 tab(s) by mouth 1 to 2 times a day, As Needed -mild pain (1-3) or fever (>100.4)   -- Indication: For pain    aspirin 81 mg oral tablet, chewable  -- 1 tab(s) by mouth once a day  -- Indication: For CVD    gabapentin 100 mg oral capsule  -- 1 cap(s) by mouth 3 times a day  -- Indication: For pain    escitalopram 5 mg oral tablet  -- 1 tab(s) by mouth once a day  -- Indication: For Depression     Janumet 50 mg-1000 mg oral tablet  -- 1 tab(s) by mouth 2 times a day   -- Indication: For Diabetes     atorvastatin 40 mg oral tablet  -- 1 tab(s) by mouth once a day (at bedtime)  -- Indication: For HLD     clopidogrel 75 mg oral tablet  -- 1 tab(s) by mouth once a day  -- Indication: For CAD    metoprolol tartrate 50 mg oral tablet  -- 1 tab(s) by mouth once a day  -- Indication: For HTN (hypertension)    amLODIPine 10 mg oral tablet  -- 1 tab(s) by mouth once a day  -- Indication: For HTN (hypertension)    famotidine 20 mg oral tablet  -- 1 tab(s) by mouth once a day  -- Indication: For prophylaxis     ferrous sulfate 325 mg (65 mg elemental iron) oral tablet  -- 1 tab(s) by mouth once a day  -- Indication: For Anemia     docusate sodium 100 mg oral capsule  -- 1 cap(s) by mouth 2 times a day  -- Indication: For Constipation     calcium-vitamin D 500 mg-200 intl units oral tablet  -- 1 tab(s) by mouth once a day  -- Indication: For vit D deficiency

## 2018-06-06 NOTE — DISCHARGE NOTE ADULT - PLAN OF CARE
To Treat the underlying medical condition You presented to hospital with altered mental status no neuro deficit on exam, CT head was negative for acute events, carotid doppler showed  Moderate (50-69%) stenosis in the left internal carotid. Vascular surgery recommended You presented to hospital with altered mental status no neuro deficit on exam, CT head was negative for acute events, carotid doppler showed  Moderate (50-69%) stenosis in the left internal carotid. Vascular surgery recommended medical management and repeat carotid doppler in 6 months. continue with aspirin and statin. You have underlying CKD, worsening of renal functions likely due to dehydration, will hold losartan, encourage PO oral intake, Avoid nephrotoxic medications and NSAID's. Currently your renal functions are at baseline. Keep A1c < 8 Your A1c 8.2, continue with home medications, follow up with primary care in 2 weeks BP is been stable, will hold Losartan for now due to Zac, BP well controlled with Metoprolol and amlodipine. Follow up with your primary care in 2 weeks Your Lipid panel wnl, will hold fenofibrate due to CKD, continue with statin

## 2018-06-06 NOTE — PROGRESS NOTE ADULT - PROVIDER SPECIALTY LIST ADULT
Cardiology
Endocrinology
Internal Medicine
Nephrology
Nephrology
Pulmonology
Pulmonology
Nephrology

## 2018-06-06 NOTE — DISCHARGE NOTE ADULT - ABILITY TO HEAR (WITH HEARING AID OR HEARING APPLIANCE IF NORMALLY USED):
Mildly to Moderately Impaired: difficulty hearing in some environments or speaker may need to increase volume or speak distinctly/left side [place]

## 2018-06-06 NOTE — DISCHARGE NOTE ADULT - CARE PLAN
Principal Discharge DX:	Altered mental status, unspecified altered mental status type  Goal:	To Treat the underlying medical condition  Assessment and plan of treatment:	You presented to hospital with altered mental status no neuro deficit on exam, CT head was negative for acute events, carotid doppler showed  Moderate (50-69%) stenosis in the left internal carotid. Vascular surgery recommended  Secondary Diagnosis:	KARLY (acute kidney injury)  Secondary Diagnosis:	Diabetes  Secondary Diagnosis:	HTN (hypertension)  Secondary Diagnosis:	Hyperlipidemia Principal Discharge DX:	Altered mental status, unspecified altered mental status type  Goal:	To Treat the underlying medical condition  Assessment and plan of treatment:	You presented to hospital with altered mental status no neuro deficit on exam, CT head was negative for acute events, carotid doppler showed  Moderate (50-69%) stenosis in the left internal carotid. Vascular surgery recommended medical management and repeat carotid doppler in 6 months. continue with aspirin and statin.  Secondary Diagnosis:	ZAC (acute kidney injury)  Assessment and plan of treatment:	You have underlying CKD, worsening of renal functions likely due to dehydration, will hold losartan, encourage PO oral intake, Avoid nephrotoxic medications and NSAID's. Currently your renal functions are at baseline.  Secondary Diagnosis:	Diabetes  Goal:	Keep A1c < 8  Assessment and plan of treatment:	Your A1c 8.2, continue with home medications, follow up with primary care in 2 weeks  Secondary Diagnosis:	HTN (hypertension)  Assessment and plan of treatment:	BP is been stable, will hold Losartan for now due to Zac, BP well controlled with Metoprolol and amlodipine. Follow up with your primary care in 2 weeks  Secondary Diagnosis:	Hyperlipidemia  Assessment and plan of treatment:	Your Lipid panel wnl, will hold fenofibrate due to CKD, continue with statin

## 2018-06-06 NOTE — PROGRESS NOTE ADULT - ATTENDING COMMENTS
[facility] NEPHROLOGY  Aryan Turk M.D.  Efe Rodgers D.O.  Silvia Richey M.D.  Tiffany Davis, MSN, ANP-C  [phone]    [address] [place] [address]
[facility] NEPHROLOGY  Aryan Turk M.D.  Efe Rodgers D.O.  Silvia Richey M.D.  Tiffany Davis, MSN, ANP-C  [phone]    [address] [place] [address]
[facility] NEPHROLOGY  Aryan Turk M.D.  Efe Rodgers D.O.  Silvia Richey M.D.  Tiffany Davis, MSN, ANP-C  [phone]    [address] [place] [address]

## 2018-06-06 NOTE — PROGRESS NOTE ADULT - PROBLEM SELECTOR PROBLEM 3
Benign hypertensive CKD, stage 1-4 or unspecified chronic kidney disease
Dementia

## 2018-06-08 LAB
CULTURE RESULTS: SIGNIFICANT CHANGE UP
CULTURE RESULTS: SIGNIFICANT CHANGE UP
SPECIMEN SOURCE: SIGNIFICANT CHANGE UP
SPECIMEN SOURCE: SIGNIFICANT CHANGE UP

## 2018-07-01 ENCOUNTER — OUTPATIENT (OUTPATIENT)
Dept: OUTPATIENT SERVICES | Facility: HOSPITAL | Age: 83
LOS: 1 days | End: 2018-07-01

## 2018-07-07 NOTE — ED ADULT TRIAGE NOTE - INTERPRETER NAME
Problem: Communication  Goal: The ability to communicate needs accurately and effectively will improve    Intervention: [place] patient and significant other/support system to call light to alert staff of needs  Rounded on routinely and as needed. Alert and aware of surroundings and safety. Will call for needs if indicated. Cooperative and compliant.      Problem: Pain Management  Goal: Pain level will decrease to patient's comfort goal    Intervention: Follow pain managment plan developed in collaboration with patient and Interdisciplinary Team  Does request prn pain Rx if needed and indicated and reports effective. Pain will be controlled and managed.         Daughter

## 2018-07-09 DIAGNOSIS — Z71.89 OTHER SPECIFIED COUNSELING: ICD-10-CM

## 2018-08-19 ENCOUNTER — EMERGENCY (EMERGENCY)
Facility: HOSPITAL | Age: 83
LOS: 1 days | Discharge: HOME | End: 2018-08-19
Attending: EMERGENCY MEDICINE
Payer: MEDICARE

## 2018-08-19 VITALS
HEART RATE: 62 BPM | DIASTOLIC BLOOD PRESSURE: 66 MMHG | OXYGEN SATURATION: 99 % | WEIGHT: 145.06 LBS | RESPIRATION RATE: 17 BRPM | HEIGHT: 60 IN | TEMPERATURE: 98 F | SYSTOLIC BLOOD PRESSURE: 190 MMHG

## 2018-08-19 VITALS
DIASTOLIC BLOOD PRESSURE: 55 MMHG | SYSTOLIC BLOOD PRESSURE: 180 MMHG | OXYGEN SATURATION: 98 % | HEART RATE: 58 BPM | RESPIRATION RATE: 17 BRPM | TEMPERATURE: 98 F

## 2018-08-19 PROBLEM — G45.9 TRANSIENT CEREBRAL ISCHEMIC ATTACK, UNSPECIFIED: Chronic | Status: ACTIVE | Noted: 2018-06-02

## 2018-08-19 LAB
ALBUMIN SERPL ELPH-MCNC: 3.6 G/DL — SIGNIFICANT CHANGE UP (ref 3.5–5)
ALP SERPL-CCNC: 66 U/L — SIGNIFICANT CHANGE UP (ref 40–120)
ALT FLD-CCNC: 22 U/L DA — SIGNIFICANT CHANGE UP (ref 10–60)
ANION GAP SERPL CALC-SCNC: 8 MMOL/L — SIGNIFICANT CHANGE UP (ref 5–17)
APTT BLD: 27.5 SEC — SIGNIFICANT CHANGE UP (ref 27.5–37.4)
AST SERPL-CCNC: 19 U/L — SIGNIFICANT CHANGE UP (ref 10–40)
BASOPHILS # BLD AUTO: 0.1 K/UL — SIGNIFICANT CHANGE UP (ref 0–0.2)
BASOPHILS NFR BLD AUTO: 0.9 % — SIGNIFICANT CHANGE UP (ref 0–2)
BILIRUB SERPL-MCNC: 0.4 MG/DL — SIGNIFICANT CHANGE UP (ref 0.2–1.2)
BUN SERPL-MCNC: 17 MG/DL — SIGNIFICANT CHANGE UP (ref 7–18)
CALCIUM SERPL-MCNC: 8.9 MG/DL — SIGNIFICANT CHANGE UP (ref 8.4–10.5)
CHLORIDE SERPL-SCNC: 94 MMOL/L — LOW (ref 96–108)
CK SERPL-CCNC: 55 U/L — SIGNIFICANT CHANGE UP (ref 21–215)
CO2 SERPL-SCNC: 28 MMOL/L — SIGNIFICANT CHANGE UP (ref 22–31)
CREAT SERPL-MCNC: 0.87 MG/DL — SIGNIFICANT CHANGE UP (ref 0.5–1.3)
EOSINOPHIL # BLD AUTO: 0.3 K/UL — SIGNIFICANT CHANGE UP (ref 0–0.5)
EOSINOPHIL NFR BLD AUTO: 3.1 % — SIGNIFICANT CHANGE UP (ref 0–6)
GLUCOSE SERPL-MCNC: 212 MG/DL — HIGH (ref 70–99)
HCT VFR BLD CALC: 40 % — SIGNIFICANT CHANGE UP (ref 34.5–45)
HGB BLD-MCNC: 13.1 G/DL — SIGNIFICANT CHANGE UP (ref 11.5–15.5)
INR BLD: 0.92 RATIO — SIGNIFICANT CHANGE UP (ref 0.88–1.16)
LYMPHOCYTES # BLD AUTO: 1.3 K/UL — SIGNIFICANT CHANGE UP (ref 1–3.3)
LYMPHOCYTES # BLD AUTO: 15 % — SIGNIFICANT CHANGE UP (ref 13–44)
MCHC RBC-ENTMCNC: 27 PG — SIGNIFICANT CHANGE UP (ref 27–34)
MCHC RBC-ENTMCNC: 32.8 GM/DL — SIGNIFICANT CHANGE UP (ref 32–36)
MCV RBC AUTO: 82.2 FL — SIGNIFICANT CHANGE UP (ref 80–100)
MONOCYTES # BLD AUTO: 0.5 K/UL — SIGNIFICANT CHANGE UP (ref 0–0.9)
MONOCYTES NFR BLD AUTO: 5.2 % — SIGNIFICANT CHANGE UP (ref 2–14)
NEUTROPHILS # BLD AUTO: 6.8 K/UL — SIGNIFICANT CHANGE UP (ref 1.8–7.4)
NEUTROPHILS NFR BLD AUTO: 75.8 % — SIGNIFICANT CHANGE UP (ref 43–77)
NT-PROBNP SERPL-SCNC: 427 PG/ML — SIGNIFICANT CHANGE UP (ref 0–450)
PLATELET # BLD AUTO: 198 K/UL — SIGNIFICANT CHANGE UP (ref 150–400)
POTASSIUM SERPL-MCNC: 4 MMOL/L — SIGNIFICANT CHANGE UP (ref 3.5–5.3)
POTASSIUM SERPL-SCNC: 4 MMOL/L — SIGNIFICANT CHANGE UP (ref 3.5–5.3)
PROT SERPL-MCNC: 7.2 G/DL — SIGNIFICANT CHANGE UP (ref 6–8.3)
PROTHROM AB SERPL-ACNC: 10 SEC — SIGNIFICANT CHANGE UP (ref 9.8–12.7)
RBC # BLD: 4.87 M/UL — SIGNIFICANT CHANGE UP (ref 3.8–5.2)
RBC # FLD: 13.8 % — SIGNIFICANT CHANGE UP (ref 10.3–14.5)
SODIUM SERPL-SCNC: 130 MMOL/L — LOW (ref 135–145)
TROPONIN I SERPL-MCNC: <0.015 NG/ML — SIGNIFICANT CHANGE UP (ref 0–0.04)
WBC # BLD: 8.9 K/UL — SIGNIFICANT CHANGE UP (ref 3.8–10.5)
WBC # FLD AUTO: 8.9 K/UL — SIGNIFICANT CHANGE UP (ref 3.8–10.5)

## 2018-08-19 PROCEDURE — 99285 EMERGENCY DEPT VISIT HI MDM: CPT

## 2018-08-19 PROCEDURE — 83880 ASSAY OF NATRIURETIC PEPTIDE: CPT

## 2018-08-19 PROCEDURE — 70450 CT HEAD/BRAIN W/O DYE: CPT | Mod: 26

## 2018-08-19 PROCEDURE — 82550 ASSAY OF CK (CPK): CPT

## 2018-08-19 PROCEDURE — 71045 X-RAY EXAM CHEST 1 VIEW: CPT | Mod: 26

## 2018-08-19 PROCEDURE — 80053 COMPREHEN METABOLIC PANEL: CPT

## 2018-08-19 PROCEDURE — 93005 ELECTROCARDIOGRAM TRACING: CPT

## 2018-08-19 PROCEDURE — 84484 ASSAY OF TROPONIN QUANT: CPT

## 2018-08-19 PROCEDURE — 82962 GLUCOSE BLOOD TEST: CPT

## 2018-08-19 PROCEDURE — 85610 PROTHROMBIN TIME: CPT

## 2018-08-19 PROCEDURE — 99284 EMERGENCY DEPT VISIT MOD MDM: CPT | Mod: 25

## 2018-08-19 PROCEDURE — 85027 COMPLETE CBC AUTOMATED: CPT

## 2018-08-19 PROCEDURE — 85730 THROMBOPLASTIN TIME PARTIAL: CPT

## 2018-08-19 PROCEDURE — 71045 X-RAY EXAM CHEST 1 VIEW: CPT

## 2018-08-19 PROCEDURE — 70450 CT HEAD/BRAIN W/O DYE: CPT

## 2018-08-19 RX ORDER — SODIUM CHLORIDE 9 MG/ML
250 INJECTION INTRAMUSCULAR; INTRAVENOUS; SUBCUTANEOUS ONCE
Qty: 0 | Refills: 0 | Status: DISCONTINUED | OUTPATIENT
Start: 2018-08-19 | End: 2018-08-23

## 2018-08-19 RX ORDER — ACETAMINOPHEN 500 MG
1000 TABLET ORAL ONCE
Qty: 0 | Refills: 0 | Status: DISCONTINUED | OUTPATIENT
Start: 2018-08-19 | End: 2018-08-23

## 2018-08-19 NOTE — ED PROVIDER NOTE - MEDICAL DECISION MAKING DETAILS
Patient with headache, HTN and abnormal EKG, will do labs, CT of head, IV Tylenol for pt pain control and reassess. Patient with headache, HTN and abnormal EKG, will do labs, CT of head, IV Tylenol for pt pain control and reassess.  Headache resolved with tylenol. labs reveal mild hyponatremia, patient received NS. home with daughter

## 2018-08-19 NOTE — ED ADULT NURSE NOTE - NSIMPLEMENTINTERV_GEN_ALL_ED
Implemented All Fall with Harm Risk Interventions:  [place] to call system. Call bell, personal items and telephone within reach. Instruct patient to call for assistance. Room bathroom lighting operational. Non-slip footwear when patient is off stretcher. Physically safe environment: no spills, clutter or unnecessary equipment. Stretcher in lowest position, wheels locked, appropriate side rails in place. Provide visual cue, wrist band, yellow gown, etc. Monitor gait and stability. Monitor for mental status changes and reorient to person, place, and time. Review medications for side effects contributing to fall risk. Reinforce activity limits and safety measures with patient and family. Provide visual clues: red socks.

## 2018-08-19 NOTE — ED PROVIDER NOTE - OBJECTIVE STATEMENT
84 y/o F patient with a significant PMHx of DM, HTN, Myositis, Osteoarthritis, TIA and no significant PSHx BIB EMS to the ED with x3 days of headache and HTN. Patient reports she normally takes Metoprolol 15mg BID. Patient states she took an extra 100mg prior to EMS arrival due to HTN. Patient notes dizzies. Patient denies chest pain, shortness of breath, or any other complaints. NKDA 84 y/o F patient with a significant PMHx of DM, HTN, Myositis, Osteoarthritis, TIA and no significant PSHx BIB EMS to the ED with x3 days of headache and HTN. Patient reports she normally takes Metoprolol 15mg BID. Patient states she took an extra 100mg of metoprolol prior to EMS arrival due to HTN. Patient notes dizzies. Patient denies chest pain, shortness of breath, or any other complaints. NKDA

## 2018-08-19 NOTE — ED ADULT NURSE NOTE - OBJECTIVE STATEMENT
85 yr biba complained of Gen weakness, headache and elevated b/p since 9 am yesterday, pt is a&ox3, vitals stable, ambulates independently, pt safety will be maintained

## 2018-09-01 ENCOUNTER — OUTPATIENT (OUTPATIENT)
Dept: OUTPATIENT SERVICES | Facility: HOSPITAL | Age: 83
LOS: 1 days | End: 2018-09-01
Payer: MEDICARE

## 2018-09-01 PROCEDURE — G9001: CPT

## 2018-09-17 DIAGNOSIS — Z71.89 OTHER SPECIFIED COUNSELING: ICD-10-CM

## 2019-02-02 NOTE — ED ADULT NURSE NOTE - ED STAT RN HAND OFF
Anesthesia Post-op Note      Patient: Constantine Lowe      Vital Last Value   Temperature 36.5 °C (97.7 °F) (02/01/19 1858)   Pulse 55 (02/01/19 1858)   Respiratory 16 (02/01/19 1858)   Non-Invasive  Blood Pressure 170/72 (02/01/19 1858)   Arterial   Blood Pressure     Pulse Oximetry 95 % (02/01/19 1858)     Mental status recovered. Patient participates in evaluation.  VS noted and are stable.  Respiratory function satisfactory; airway patent.  Cardiovascular function and hydration status satisfactory.  Adequate pain control.  Nausea and vomiting control satisfactory.    No apparent anesthetic complications.    Comments:  
Handoff

## 2019-04-23 NOTE — PHYSICAL THERAPY INITIAL EVALUATION ADULT - ADDITIONAL COMMENTS
47 gene reflex panel ordered.  
I spoke with Nicky Briscoe to review her genetic test results. Unfortunately, I do not have a copy of the report for review; however, per a phone note from Celsa Ley, genetic testing via [facility] Breast Cancer STAT Panel did not identify any pathogenic variants in 9 genes.     A STAT panel was requested in order to make treatment decisions; however, Ms. Briscoe remains interested in additional genetic testing based on her family history. I do not have access to the original order, but will request that Nichelle submit a re-requisition to [facility] to order additional genetic testing upon her return next week. In the meantime, Ms. Briscoe's cancer risk and medical management should continue to be based upon her personal and family history.  
Pt. states has HA (CDPAP) x 8 hrs x 5 days.

## 2019-10-03 ENCOUNTER — INPATIENT (INPATIENT)
Facility: HOSPITAL | Age: 84
LOS: 4 days | Discharge: HOME | DRG: 291 | End: 2019-10-08
Attending: INTERNAL MEDICINE | Admitting: INTERNAL MEDICINE
Payer: MEDICARE

## 2019-10-03 VITALS
HEART RATE: 57 BPM | SYSTOLIC BLOOD PRESSURE: 177 MMHG | TEMPERATURE: 98 F | WEIGHT: 139.99 LBS | DIASTOLIC BLOOD PRESSURE: 67 MMHG | OXYGEN SATURATION: 96 % | RESPIRATION RATE: 16 BRPM | HEIGHT: 64 IN

## 2019-10-03 DIAGNOSIS — N39.0 URINARY TRACT INFECTION, SITE NOT SPECIFIED: ICD-10-CM

## 2019-10-03 DIAGNOSIS — I50.33 ACUTE ON CHRONIC DIASTOLIC (CONGESTIVE) HEART FAILURE: ICD-10-CM

## 2019-10-03 DIAGNOSIS — I10 ESSENTIAL (PRIMARY) HYPERTENSION: ICD-10-CM

## 2019-10-03 DIAGNOSIS — E11.9 TYPE 2 DIABETES MELLITUS WITHOUT COMPLICATIONS: ICD-10-CM

## 2019-10-03 DIAGNOSIS — F03.90 UNSPECIFIED DEMENTIA WITHOUT BEHAVIORAL DISTURBANCE: ICD-10-CM

## 2019-10-03 DIAGNOSIS — J18.9 PNEUMONIA, UNSPECIFIED ORGANISM: ICD-10-CM

## 2019-10-03 DIAGNOSIS — Z29.9 ENCOUNTER FOR PROPHYLACTIC MEASURES, UNSPECIFIED: ICD-10-CM

## 2019-10-03 DIAGNOSIS — R09.89 OTHER SPECIFIED SYMPTOMS AND SIGNS INVOLVING THE CIRCULATORY AND RESPIRATORY SYSTEMS: ICD-10-CM

## 2019-10-03 DIAGNOSIS — E87.1 HYPO-OSMOLALITY AND HYPONATREMIA: ICD-10-CM

## 2019-10-03 LAB
ALBUMIN SERPL ELPH-MCNC: 3.5 G/DL — SIGNIFICANT CHANGE UP (ref 3.5–5)
ALP SERPL-CCNC: 52 U/L — SIGNIFICANT CHANGE UP (ref 40–120)
ALT FLD-CCNC: 25 U/L DA — SIGNIFICANT CHANGE UP (ref 10–60)
ANION GAP SERPL CALC-SCNC: 7 MMOL/L — SIGNIFICANT CHANGE UP (ref 5–17)
ANION GAP SERPL CALC-SCNC: 8 MMOL/L — SIGNIFICANT CHANGE UP (ref 5–17)
ANION GAP SERPL CALC-SCNC: 9 MMOL/L — SIGNIFICANT CHANGE UP (ref 5–17)
ANION GAP SERPL CALC-SCNC: 9 MMOL/L — SIGNIFICANT CHANGE UP (ref 5–17)
APPEARANCE UR: CLEAR — SIGNIFICANT CHANGE UP
APTT BLD: 29.8 SEC — SIGNIFICANT CHANGE UP (ref 27.5–36.3)
AST SERPL-CCNC: 27 U/L — SIGNIFICANT CHANGE UP (ref 10–40)
BASE EXCESS BLDV CALC-SCNC: -1.5 MMOL/L — SIGNIFICANT CHANGE UP (ref -2–2)
BASOPHILS # BLD AUTO: 0.04 K/UL — SIGNIFICANT CHANGE UP (ref 0–0.2)
BASOPHILS NFR BLD AUTO: 0.4 % — SIGNIFICANT CHANGE UP (ref 0–2)
BILIRUB SERPL-MCNC: 0.3 MG/DL — SIGNIFICANT CHANGE UP (ref 0.2–1.2)
BILIRUB UR-MCNC: NEGATIVE — SIGNIFICANT CHANGE UP
BLOOD GAS COMMENTS, VENOUS: SIGNIFICANT CHANGE UP
BUN SERPL-MCNC: 19 MG/DL — HIGH (ref 7–18)
BUN SERPL-MCNC: 19 MG/DL — HIGH (ref 7–18)
BUN SERPL-MCNC: 22 MG/DL — HIGH (ref 7–18)
BUN SERPL-MCNC: 24 MG/DL — HIGH (ref 7–18)
CALCIUM SERPL-MCNC: 8.5 MG/DL — SIGNIFICANT CHANGE UP (ref 8.4–10.5)
CALCIUM SERPL-MCNC: 8.9 MG/DL — SIGNIFICANT CHANGE UP (ref 8.4–10.5)
CALCIUM SERPL-MCNC: 8.9 MG/DL — SIGNIFICANT CHANGE UP (ref 8.4–10.5)
CALCIUM SERPL-MCNC: 9.1 MG/DL — SIGNIFICANT CHANGE UP (ref 8.4–10.5)
CHLORIDE SERPL-SCNC: 89 MMOL/L — LOW (ref 96–108)
CHLORIDE SERPL-SCNC: 92 MMOL/L — LOW (ref 96–108)
CHLORIDE UR-SCNC: 93 MMOL/L — SIGNIFICANT CHANGE UP (ref 55–125)
CK MB BLD-MCNC: 3.4 % — SIGNIFICANT CHANGE UP (ref 0–3.5)
CK MB CFR SERPL CALC: 2.3 NG/ML — SIGNIFICANT CHANGE UP (ref 0–3.6)
CK MB CFR SERPL CALC: 2.6 NG/ML — SIGNIFICANT CHANGE UP (ref 0–3.6)
CK SERPL-CCNC: 68 U/L — SIGNIFICANT CHANGE UP (ref 21–215)
CO2 SERPL-SCNC: 25 MMOL/L — SIGNIFICANT CHANGE UP (ref 22–31)
CO2 SERPL-SCNC: 25 MMOL/L — SIGNIFICANT CHANGE UP (ref 22–31)
CO2 SERPL-SCNC: 26 MMOL/L — SIGNIFICANT CHANGE UP (ref 22–31)
CO2 SERPL-SCNC: 27 MMOL/L — SIGNIFICANT CHANGE UP (ref 22–31)
COLOR SPEC: YELLOW — SIGNIFICANT CHANGE UP
CREAT ?TM UR-MCNC: 16 MG/DL — SIGNIFICANT CHANGE UP
CREAT SERPL-MCNC: 0.8 MG/DL — SIGNIFICANT CHANGE UP (ref 0.5–1.3)
CREAT SERPL-MCNC: 0.83 MG/DL — SIGNIFICANT CHANGE UP (ref 0.5–1.3)
CREAT SERPL-MCNC: 0.9 MG/DL — SIGNIFICANT CHANGE UP (ref 0.5–1.3)
CREAT SERPL-MCNC: 0.92 MG/DL — SIGNIFICANT CHANGE UP (ref 0.5–1.3)
DIFF PNL FLD: NEGATIVE — SIGNIFICANT CHANGE UP
EOSINOPHIL # BLD AUTO: 0.18 K/UL — SIGNIFICANT CHANGE UP (ref 0–0.5)
EOSINOPHIL NFR BLD AUTO: 2 % — SIGNIFICANT CHANGE UP (ref 0–6)
FLU A RESULT: SIGNIFICANT CHANGE UP
FLU A RESULT: SIGNIFICANT CHANGE UP
FLUAV AG NPH QL: SIGNIFICANT CHANGE UP
FLUBV AG NPH QL: SIGNIFICANT CHANGE UP
GLUCOSE BLDC GLUCOMTR-MCNC: 148 MG/DL — HIGH (ref 70–99)
GLUCOSE BLDC GLUCOMTR-MCNC: 151 MG/DL — HIGH (ref 70–99)
GLUCOSE BLDC GLUCOMTR-MCNC: 186 MG/DL — HIGH (ref 70–99)
GLUCOSE BLDC GLUCOMTR-MCNC: 197 MG/DL — HIGH (ref 70–99)
GLUCOSE SERPL-MCNC: 174 MG/DL — HIGH (ref 70–99)
GLUCOSE SERPL-MCNC: 196 MG/DL — HIGH (ref 70–99)
GLUCOSE SERPL-MCNC: 196 MG/DL — HIGH (ref 70–99)
GLUCOSE SERPL-MCNC: 259 MG/DL — HIGH (ref 70–99)
GLUCOSE UR QL: NEGATIVE — SIGNIFICANT CHANGE UP
HCO3 BLDV-SCNC: 23 MMOL/L — SIGNIFICANT CHANGE UP (ref 21–29)
HCT VFR BLD CALC: 33.4 % — LOW (ref 34.5–45)
HGB BLD-MCNC: 11.1 G/DL — LOW (ref 11.5–15.5)
HOROWITZ INDEX BLDV+IHG-RTO: 21 — SIGNIFICANT CHANGE UP
IMM GRANULOCYTES NFR BLD AUTO: 0.5 % — SIGNIFICANT CHANGE UP (ref 0–1.5)
INR BLD: 0.91 RATIO — SIGNIFICANT CHANGE UP (ref 0.88–1.16)
KETONES UR-MCNC: NEGATIVE — SIGNIFICANT CHANGE UP
LACTATE SERPL-SCNC: 1.7 MMOL/L — SIGNIFICANT CHANGE UP (ref 0.7–2)
LEUKOCYTE ESTERASE UR-ACNC: ABNORMAL
LYMPHOCYTES # BLD AUTO: 0.86 K/UL — LOW (ref 1–3.3)
LYMPHOCYTES # BLD AUTO: 9.4 % — LOW (ref 13–44)
MCHC RBC-ENTMCNC: 26.4 PG — LOW (ref 27–34)
MCHC RBC-ENTMCNC: 33.2 GM/DL — SIGNIFICANT CHANGE UP (ref 32–36)
MCV RBC AUTO: 79.3 FL — LOW (ref 80–100)
MONOCYTES # BLD AUTO: 0.45 K/UL — SIGNIFICANT CHANGE UP (ref 0–0.9)
MONOCYTES NFR BLD AUTO: 4.9 % — SIGNIFICANT CHANGE UP (ref 2–14)
NEUTROPHILS # BLD AUTO: 7.55 K/UL — HIGH (ref 1.8–7.4)
NEUTROPHILS NFR BLD AUTO: 82.8 % — HIGH (ref 43–77)
NITRITE UR-MCNC: NEGATIVE — SIGNIFICANT CHANGE UP
NRBC # BLD: 0 /100 WBCS — SIGNIFICANT CHANGE UP (ref 0–0)
NT-PROBNP SERPL-SCNC: 1892 PG/ML — HIGH (ref 0–450)
OSMOLALITY UR: 282 MOS/KG — SIGNIFICANT CHANGE UP (ref 50–1200)
PCO2 BLDV: 43 MMHG — SIGNIFICANT CHANGE UP (ref 35–50)
PH BLDV: 7.36 — SIGNIFICANT CHANGE UP (ref 7.35–7.45)
PH UR: 7 — SIGNIFICANT CHANGE UP (ref 5–8)
PLATELET # BLD AUTO: 270 K/UL — SIGNIFICANT CHANGE UP (ref 150–400)
PO2 BLDV: 36 MMHG — SIGNIFICANT CHANGE UP (ref 25–45)
POTASSIUM SERPL-MCNC: 3.7 MMOL/L — SIGNIFICANT CHANGE UP (ref 3.5–5.3)
POTASSIUM SERPL-MCNC: 3.9 MMOL/L — SIGNIFICANT CHANGE UP (ref 3.5–5.3)
POTASSIUM SERPL-MCNC: 4.2 MMOL/L — SIGNIFICANT CHANGE UP (ref 3.5–5.3)
POTASSIUM SERPL-MCNC: 4.3 MMOL/L — SIGNIFICANT CHANGE UP (ref 3.5–5.3)
POTASSIUM SERPL-SCNC: 3.7 MMOL/L — SIGNIFICANT CHANGE UP (ref 3.5–5.3)
POTASSIUM SERPL-SCNC: 3.9 MMOL/L — SIGNIFICANT CHANGE UP (ref 3.5–5.3)
POTASSIUM SERPL-SCNC: 4.2 MMOL/L — SIGNIFICANT CHANGE UP (ref 3.5–5.3)
POTASSIUM SERPL-SCNC: 4.3 MMOL/L — SIGNIFICANT CHANGE UP (ref 3.5–5.3)
PROCALCITONIN SERPL-MCNC: 0.06 NG/ML — SIGNIFICANT CHANGE UP (ref 0.02–0.1)
PROT ?TM UR-MCNC: 42 MG/DL — HIGH (ref 0–12)
PROT SERPL-MCNC: 6.9 G/DL — SIGNIFICANT CHANGE UP (ref 6–8.3)
PROT UR-MCNC: 100
PROTHROM AB SERPL-ACNC: 10.1 SEC — SIGNIFICANT CHANGE UP (ref 10–12.9)
RBC # BLD: 4.21 M/UL — SIGNIFICANT CHANGE UP (ref 3.8–5.2)
RBC # FLD: 13.9 % — SIGNIFICANT CHANGE UP (ref 10.3–14.5)
RSV RESULT: SIGNIFICANT CHANGE UP
RSV RNA RESP QL NAA+PROBE: SIGNIFICANT CHANGE UP
SAO2 % BLDV: 64 % — LOW (ref 67–88)
SODIUM SERPL-SCNC: 123 MMOL/L — LOW (ref 135–145)
SODIUM SERPL-SCNC: 123 MMOL/L — LOW (ref 135–145)
SODIUM SERPL-SCNC: 124 MMOL/L — LOW (ref 135–145)
SODIUM SERPL-SCNC: 125 MMOL/L — LOW (ref 135–145)
SODIUM UR-SCNC: 82 MMOL/L — SIGNIFICANT CHANGE UP (ref 40–220)
SP GR SPEC: 1.01 — SIGNIFICANT CHANGE UP (ref 1.01–1.02)
TROPONIN I SERPL-MCNC: 0.02 NG/ML — SIGNIFICANT CHANGE UP (ref 0–0.04)
TROPONIN I SERPL-MCNC: <0.015 NG/ML — SIGNIFICANT CHANGE UP (ref 0–0.04)
TROPONIN I SERPL-MCNC: <0.015 NG/ML — SIGNIFICANT CHANGE UP (ref 0–0.04)
UROBILINOGEN FLD QL: NEGATIVE — SIGNIFICANT CHANGE UP
WBC # BLD: 9.13 K/UL — SIGNIFICANT CHANGE UP (ref 3.8–10.5)
WBC # FLD AUTO: 9.13 K/UL — SIGNIFICANT CHANGE UP (ref 3.8–10.5)

## 2019-10-03 PROCEDURE — 99283 EMERGENCY DEPT VISIT LOW MDM: CPT

## 2019-10-03 PROCEDURE — 99223 1ST HOSP IP/OBS HIGH 75: CPT

## 2019-10-03 PROCEDURE — 93010 ELECTROCARDIOGRAM REPORT: CPT

## 2019-10-03 PROCEDURE — 71045 X-RAY EXAM CHEST 1 VIEW: CPT | Mod: 26

## 2019-10-03 PROCEDURE — 93306 TTE W/DOPPLER COMPLETE: CPT | Mod: 26

## 2019-10-03 RX ORDER — AMITRIPTYLINE HCL 25 MG
25 TABLET ORAL AT BEDTIME
Refills: 0 | Status: DISCONTINUED | OUTPATIENT
Start: 2019-10-03 | End: 2019-10-08

## 2019-10-03 RX ORDER — CEFTRIAXONE 500 MG/1
1000 INJECTION, POWDER, FOR SOLUTION INTRAMUSCULAR; INTRAVENOUS EVERY 24 HOURS
Refills: 0 | Status: DISCONTINUED | OUTPATIENT
Start: 2019-10-03 | End: 2019-10-08

## 2019-10-03 RX ORDER — MORPHINE SULFATE 50 MG/1
4 CAPSULE, EXTENDED RELEASE ORAL ONCE
Refills: 0 | Status: DISCONTINUED | OUTPATIENT
Start: 2019-10-03 | End: 2019-10-03

## 2019-10-03 RX ORDER — CLOPIDOGREL BISULFATE 75 MG/1
75 TABLET, FILM COATED ORAL DAILY
Refills: 0 | Status: DISCONTINUED | OUTPATIENT
Start: 2019-10-03 | End: 2019-10-03

## 2019-10-03 RX ORDER — AZITHROMYCIN 500 MG/1
500 TABLET, FILM COATED ORAL ONCE
Refills: 0 | Status: COMPLETED | OUTPATIENT
Start: 2019-10-03 | End: 2019-10-03

## 2019-10-03 RX ORDER — AZITHROMYCIN 500 MG/1
500 TABLET, FILM COATED ORAL EVERY 24 HOURS
Refills: 0 | Status: COMPLETED | OUTPATIENT
Start: 2019-10-03 | End: 2019-10-07

## 2019-10-03 RX ORDER — ATORVASTATIN CALCIUM 80 MG/1
80 TABLET, FILM COATED ORAL DAILY
Refills: 0 | Status: DISCONTINUED | OUTPATIENT
Start: 2019-10-03 | End: 2019-10-08

## 2019-10-03 RX ORDER — FUROSEMIDE 40 MG
20 TABLET ORAL
Refills: 0 | Status: DISCONTINUED | OUTPATIENT
Start: 2019-10-03 | End: 2019-10-07

## 2019-10-03 RX ORDER — ENOXAPARIN SODIUM 100 MG/ML
40 INJECTION SUBCUTANEOUS DAILY
Refills: 0 | Status: DISCONTINUED | OUTPATIENT
Start: 2019-10-03 | End: 2019-10-08

## 2019-10-03 RX ORDER — HYDRALAZINE HCL 50 MG
25 TABLET ORAL THREE TIMES A DAY
Refills: 0 | Status: DISCONTINUED | OUTPATIENT
Start: 2019-10-03 | End: 2019-10-04

## 2019-10-03 RX ORDER — AMLODIPINE BESYLATE 2.5 MG/1
10 TABLET ORAL DAILY
Refills: 0 | Status: DISCONTINUED | OUTPATIENT
Start: 2019-10-03 | End: 2019-10-08

## 2019-10-03 RX ORDER — FENOFIBRATE,MICRONIZED 130 MG
145 CAPSULE ORAL DAILY
Refills: 0 | Status: DISCONTINUED | OUTPATIENT
Start: 2019-10-03 | End: 2019-10-08

## 2019-10-03 RX ORDER — ESCITALOPRAM OXALATE 10 MG/1
5 TABLET, FILM COATED ORAL DAILY
Refills: 0 | Status: DISCONTINUED | OUTPATIENT
Start: 2019-10-03 | End: 2019-10-04

## 2019-10-03 RX ORDER — ACETAMINOPHEN 500 MG
650 TABLET ORAL EVERY 6 HOURS
Refills: 0 | Status: DISCONTINUED | OUTPATIENT
Start: 2019-10-03 | End: 2019-10-08

## 2019-10-03 RX ORDER — FUROSEMIDE 40 MG
60 TABLET ORAL ONCE
Refills: 0 | Status: COMPLETED | OUTPATIENT
Start: 2019-10-03 | End: 2019-10-03

## 2019-10-03 RX ORDER — CEFTRIAXONE 500 MG/1
1000 INJECTION, POWDER, FOR SOLUTION INTRAMUSCULAR; INTRAVENOUS ONCE
Refills: 0 | Status: COMPLETED | OUTPATIENT
Start: 2019-10-03 | End: 2019-10-03

## 2019-10-03 RX ORDER — ASPIRIN/CALCIUM CARB/MAGNESIUM 324 MG
81 TABLET ORAL DAILY
Refills: 0 | Status: DISCONTINUED | OUTPATIENT
Start: 2019-10-03 | End: 2019-10-08

## 2019-10-03 RX ORDER — INSULIN LISPRO 100/ML
VIAL (ML) SUBCUTANEOUS
Refills: 0 | Status: DISCONTINUED | OUTPATIENT
Start: 2019-10-03 | End: 2019-10-08

## 2019-10-03 RX ORDER — METOPROLOL TARTRATE 50 MG
25 TABLET ORAL
Refills: 0 | Status: DISCONTINUED | OUTPATIENT
Start: 2019-10-03 | End: 2019-10-08

## 2019-10-03 RX ORDER — MEMANTINE HYDROCHLORIDE 10 MG/1
10 TABLET ORAL AT BEDTIME
Refills: 0 | Status: DISCONTINUED | OUTPATIENT
Start: 2019-10-03 | End: 2019-10-08

## 2019-10-03 RX ADMIN — AZITHROMYCIN 255 MILLIGRAM(S): 500 TABLET, FILM COATED ORAL at 07:33

## 2019-10-03 RX ADMIN — MORPHINE SULFATE 4 MILLIGRAM(S): 50 CAPSULE, EXTENDED RELEASE ORAL at 05:29

## 2019-10-03 RX ADMIN — ESCITALOPRAM OXALATE 5 MILLIGRAM(S): 10 TABLET, FILM COATED ORAL at 11:36

## 2019-10-03 RX ADMIN — Medication 145 MILLIGRAM(S): at 13:32

## 2019-10-03 RX ADMIN — Medication 81 MILLIGRAM(S): at 11:36

## 2019-10-03 RX ADMIN — Medication 60 MILLIGRAM(S): at 06:58

## 2019-10-03 RX ADMIN — ENOXAPARIN SODIUM 40 MILLIGRAM(S): 100 INJECTION SUBCUTANEOUS at 11:39

## 2019-10-03 RX ADMIN — Medication 25 MILLIGRAM(S): at 21:58

## 2019-10-03 RX ADMIN — MEMANTINE HYDROCHLORIDE 10 MILLIGRAM(S): 10 TABLET ORAL at 21:58

## 2019-10-03 RX ADMIN — Medication 20 MILLIGRAM(S): at 18:05

## 2019-10-03 RX ADMIN — MORPHINE SULFATE 4 MILLIGRAM(S): 50 CAPSULE, EXTENDED RELEASE ORAL at 08:31

## 2019-10-03 RX ADMIN — Medication 25 MILLIGRAM(S): at 14:13

## 2019-10-03 RX ADMIN — CEFTRIAXONE 100 MILLIGRAM(S): 500 INJECTION, POWDER, FOR SOLUTION INTRAMUSCULAR; INTRAVENOUS at 06:55

## 2019-10-03 RX ADMIN — Medication 25 MILLIGRAM(S): at 21:59

## 2019-10-03 RX ADMIN — ATORVASTATIN CALCIUM 80 MILLIGRAM(S): 80 TABLET, FILM COATED ORAL at 11:36

## 2019-10-03 NOTE — ED PROVIDER NOTE - CLINICAL SUMMARY MEDICAL DECISION MAKING FREE TEXT BOX
86 year old female with cp and sob. hypertensive. PE as above.  labs are significant for elevated BNP and hyponatremia. cxr with pulm congestion vs infiltrate. vbg unremarkable. ecg unchanged. given lasix, CAP abx. will admit.

## 2019-10-03 NOTE — ED ADULT NURSE REASSESSMENT NOTE - NS ED NURSE REASSESS COMMENT FT1
0800 am - axox3 ,nad , Sri Lankan speaking only , attached to car monitor - box B , stable v/s ,nad ,

## 2019-10-03 NOTE — H&P ADULT - PROBLEM SELECTOR PLAN 1
p/w hypoxia to 80s w/x CXR findings of b/l congestion  respiratory status improved s/p lasix 60mg IV push x1 in ED  proBNP 1892 on admission  starting lasix 20mg IV push bid  monitor strict I/Os  daily weights  f/u TTE (echo in 2018 showed only a grade II diastolic dysfunction)  1L fluid restriction  initial troponin negative, f/u t2 and t3  cardiology consulted - Dr. Martell p/w hypoxia to 80s w/x CXR findings of b/l congestion  respiratory status improved s/p lasix 60mg IV push x1 in ED  VBG does not suggest acid-base disturbance  proBNP 1892 on admission  starting lasix 20mg IV push bid  monitor strict I/Os  daily weights  f/u TTE (echo in 2018 showed only a grade II diastolic dysfunction)  1L fluid restriction  initial troponin negative, f/u t2 and t3  asa, statin and bblocker  cardiology consulted - Dr. Martell

## 2019-10-03 NOTE — H&P ADULT - HISTORY OF PRESENT ILLNESS
87 y/o [place]/Emirati-speaking female, AAOx2 at baseline, ambulates with cane and walker, lives with daughter, with PMHx of diastolic CHF (pEF), DM, HTN, dementia, b/l femoral stent and 1 cardiac stent placed 9 years ago, iron deficiency anemia presents to the ED with chief complaint of chest discomfort and dyspnea. Patient is an extremely poor historian and continues to say she is only here because she felt "sick in her heart". Attempted to reach daughter multiple times for collateral history without success. Patient was noted to be hypoxic in the 80s on admission, now maintaining O2 sat >92% on 2L NC after initial dose of lasix. She is in no acute distress.

## 2019-10-03 NOTE — CONSULT NOTE ADULT - SUBJECTIVE AND OBJECTIVE BOX
CHIEF COMPLAINT: Chest pain    HPI: 85 yo Palestinian-speaking F with dementia, HFpEF (EF 55-60% by echo 06/2018, GIIdd), DM, HTN, HLD, CAD s/p PCI (9 years ago) and PAD who presents with chest pain and dyspnea. Patient    PAST MEDICAL & SURGICAL HISTORY:  As above, also TIA (transient ischemic attack), Myositis: on prednisone x 5 years, Osteoarthritis, anemia  No significant past surgical history      Allergies    No Known Allergies    MEDICATIONS  (STANDING):  amitriptyline 25 milliGRAM(s) Oral at bedtime  amLODIPine   Tablet 10 milliGRAM(s) Oral daily  aspirin  chewable 81 milliGRAM(s) Oral daily  atorvastatin 80 milliGRAM(s) Oral daily  azithromycin  IVPB 500 milliGRAM(s) IV Intermittent every 24 hours  cefTRIAXone   IVPB 1000 milliGRAM(s) IV Intermittent every 24 hours  enoxaparin Injectable 40 milliGRAM(s) SubCutaneous daily  escitalopram 5 milliGRAM(s) Oral daily  fenofibrate Tablet 145 milliGRAM(s) Oral daily  furosemide   Injectable 20 milliGRAM(s) IV Push two times a day  hydrALAZINE 25 milliGRAM(s) Oral three times a day  insulin lispro (HumaLOG) corrective regimen sliding scale   SubCutaneous three times a day before meals  memantine 10 milliGRAM(s) Oral at bedtime  metoprolol tartrate 25 milliGRAM(s) Oral two times a day    MEDICATIONS  (PRN):  acetaminophen   Tablet .. 650 milliGRAM(s) Oral every 6 hours PRN Moderate Pain (4 - 6)      FAMILY HISTORY:  No pertinent family history in first degree relatives    No family history of premature coronary artery disease or sudden cardiac death    SOCIAL HISTORY:  Smoking-  Alcohol-  Illicit Drug use-    REVIEW OF SYSTEMS:  Constitutional: [ ] fever, [ ]weight loss,  [ ]fatigue  Eyes: [ ] visual changes  Respiratory: [ ]shortness of breath;  [ ] cough, [ ]wheezing, [ ]chills, [ ]hemoptysis  Cardiovascular: [ ] chest pain, [ ]palpitations, [ ]dizziness,  [ ]leg swelling [ ]syncope  Gastrointestinal: [ ] abdominal pain, [ ]nausea, [ ]vomiting,  [ ]diarrhea   Genitourinary: [ ] dysuria, [ ] hematuria  Neurologic: [ ] headaches [ ] tremors  [ ] weakness [ ] lightheadedness  Skin: [ ] itching, [ ]burning, [ ] rashes  Endocrine: [ ] heat or cold intolerance  Musculoskeletal: [ ] joint pain or swelling; [ ] muscle, back, or extremity pain  Psychiatric: [ ] depression, [ ]anxiety, [ ]mood swings, or [ ]difficulty sleeping  Hematologic: [ ] easy bruising, [ ] bleeding gums       [ x] All others negative	  [ ] Unable to obtain    Vital Signs Last 24 Hrs  T(C): 36.8 (03 Oct 2019 07:13), Max: 36.8 (03 Oct 2019 07:13)  T(F): 98.3 (03 Oct 2019 07:13), Max: 98.3 (03 Oct 2019 07:13)  HR: 54 (03 Oct 2019 07:13) (54 - 59)  BP: 150/50 (03 Oct 2019 07:13) (150/50 - 177/67)  BP(mean): --  RR: 18 (03 Oct 2019 04:47) (16 - 18)  SpO2: 92% (03 Oct 2019 07:13) (92% - 96%)  I&O's Summary      PHYSICAL EXAM:  General: No acute distress  HEENT: EOMI, PERRL  Neck: Supple, No JVD  Lungs: Clear to auscultation bilaterally; No rales or wheezing  Heart: Regular rate and rhythm; No murmurs, rubs, or gallops  Abdomen: Nontender, bowel sounds present  Extremities: No clubbing, cyanosis, or edema  Nervous system:  Alert & Oriented X3, no focal deficits  Psychiatric: Normal affect  Skin: No rashes or lesions      LABS:  10-03    125<L>  |  89<L>  |  19<H>  ----------------------------<  196<H>  4.2   |  27  |  0.80    Ca    8.9      03 Oct 2019 10:33    TPro  6.9  /  Alb  3.5  /  TBili  0.3  /  DBili  x   /  AST  27  /  ALT  25  /  AlkPhos  52  10-03    Creatinine Trend: 0.80<--, 0.83<--                        11.1   9.13  )-----------( 270      ( 03 Oct 2019 04:18 )             33.4     PT/INR - ( 03 Oct 2019 04:18 )   PT: 10.1 sec;   INR: 0.91 ratio         PTT - ( 03 Oct 2019 04:18 )  PTT:29.8 sec    Lipid Panel:   Cardiac Enzymes: CARDIAC MARKERS ( 03 Oct 2019 04:18 )  0.018 ng/mL / x     / x     / x     / 2.6 ng/mL      Serum Pro-Brain Natriuretic Peptide: 1892 pg/mL (10-03-19 @ 04:18)    RADIOLOGY: < from: Xray Chest 1 View- PORTABLE-Urgent (10.03.19 @ 04:28) >  IMPRESSION:  Compatible with CHF. Clinical correlation is suggested.    < end of copied text >    ECG [my interpretation]: 10/3/2019 @ 09:24: Sinus rhythm, nonspecific ST-T wave abnormality unchanged from 2018    TELEMETRY:    ECHO: < from: Transthoracic Echocardiogram (06.04.18 @ 06:40) >  CONCLUSIONS:  1. Mitral annular calcification. Mild mitral regurgitation.  2. Calcified trileaflet aortic valve with normal opening. Mild aortic insufficiency.  3. Aortic Root: 3.4 cm.  4. Mild left atrial enlargement.  5. Normal left ventricular internal dimensions and wall  thicknesses.  6. Normal Left Ventricular Systolic Function,  (EF = 55 to 60%)  7. Grade II diastolic dysfunction.  8. Normal right atrium.  9. Normal right ventricular size and function.  10. RV systolic pressure is 31 mm Hg.  11. Normal tricuspid valve.  12. There is mild pulmonic regurgitation.  13. Normal pericardium with no pericardial effusion.    < end of copied text > CHIEF COMPLAINT: Chest pain    HPI: 85 yo Stateless-speaking F with ? mild dementia, HFpEF (EF 55-60% by echo 06/2018, GIIdd), DM, HTN, HLD, CAD s/p PCI (9 years ago) and PAD who presents with chest pain and dyspnea. Patient reports symptoms have been present for the past month or so, and she also developed pressure-like chest discomfort 2 days prior to admission. Dyspnea predominantly occurred when patient was exerting herself. No associated LH, palpitations or syncope. Patient states she has been eating salty foods and drinking beers frequently (~1 beer every day or every other day). She reports she felt better after receiving furosemide in the ER, and currently denies any chest pain or dyspnea at all.     PAST MEDICAL & SURGICAL HISTORY:  As above, also TIA (transient ischemic attack), Myositis: on prednisone x 5 years, Osteoarthritis, anemia  No significant past surgical history      Allergies    No Known Allergies    MEDICATIONS  (STANDING):  amitriptyline 25 milliGRAM(s) Oral at bedtime  amLODIPine   Tablet 10 milliGRAM(s) Oral daily  aspirin  chewable 81 milliGRAM(s) Oral daily  atorvastatin 80 milliGRAM(s) Oral daily  azithromycin  IVPB 500 milliGRAM(s) IV Intermittent every 24 hours  cefTRIAXone   IVPB 1000 milliGRAM(s) IV Intermittent every 24 hours  enoxaparin Injectable 40 milliGRAM(s) SubCutaneous daily  escitalopram 5 milliGRAM(s) Oral daily  fenofibrate Tablet 145 milliGRAM(s) Oral daily  furosemide   Injectable 20 milliGRAM(s) IV Push two times a day  hydrALAZINE 25 milliGRAM(s) Oral three times a day  insulin lispro (HumaLOG) corrective regimen sliding scale   SubCutaneous three times a day before meals  memantine 10 milliGRAM(s) Oral at bedtime  metoprolol tartrate 25 milliGRAM(s) Oral two times a day    MEDICATIONS  (PRN):  acetaminophen   Tablet .. 650 milliGRAM(s) Oral every 6 hours PRN Moderate Pain (4 - 6)      FAMILY HISTORY:  No family history of premature coronary artery disease or sudden cardiac death    SOCIAL HISTORY:  Smoking-Denies  Alcohol-Active  Illicit Drug use-Denies    REVIEW OF SYSTEMS:  Constitutional: [ ] fever, [ ]weight loss,  [ ]fatigue  Eyes: [ ] visual changes  Respiratory: [ x]shortness of breath;  [ ] cough, [ ]wheezing, [ ]chills, [ ]hemoptysis  Cardiovascular: [ ] chest pain, [ ]palpitations, [ ]dizziness,  [ ]leg swelling [ ]syncope  Gastrointestinal: [ ] abdominal pain, [ ]nausea, [ ]vomiting,  [ ]diarrhea   Genitourinary: [ ] dysuria, [ ] hematuria  Neurologic: [ ] headaches [ ] tremors  [ ] weakness [ ] lightheadedness  Skin: [ ] itching, [ ]burning, [ ] rashes  Endocrine: [ ] heat or cold intolerance  Musculoskeletal: [ ] joint pain or swelling; [ ] muscle, back, or extremity pain  Psychiatric: [ ] depression, [ ]anxiety, [ ]mood swings, or [ ]difficulty sleeping  Hematologic: [ ] easy bruising, [ ] bleeding gums       [ x] All others negative	  [ ] Unable to obtain    Vital Signs Last 24 Hrs  T(C): 36.8 (03 Oct 2019 07:13), Max: 36.8 (03 Oct 2019 07:13)  T(F): 98.3 (03 Oct 2019 07:13), Max: 98.3 (03 Oct 2019 07:13)  HR: 54 (03 Oct 2019 07:13) (54 - 59)  BP: 150/50 (03 Oct 2019 07:13) (150/50 - 177/67)  BP(mean): --  RR: 18 (03 Oct 2019 04:47) (16 - 18)  SpO2: 92% (03 Oct 2019 07:13) (92% - 96%)  I&O's Summary      PHYSICAL EXAM:  General: No acute distress  HEENT: EOMI, PERRL  Neck: Supple, No JVD  Lungs: Clear to auscultation bilaterally; No rales or wheezing  Heart: Regular rate and rhythm; No murmurs, rubs, or gallops  Abdomen: Nontender, bowel sounds present  Extremities: No clubbing, cyanosis, or edema  Nervous system:  Alert & Oriented X3, no focal deficits  Psychiatric: Normal affect  Skin: No rashes or lesions      LABS:  10-03    125<L>  |  89<L>  |  19<H>  ----------------------------<  196<H>  4.2   |  27  |  0.80    Ca    8.9      03 Oct 2019 10:33    TPro  6.9  /  Alb  3.5  /  TBili  0.3  /  DBili  x   /  AST  27  /  ALT  25  /  AlkPhos  52  10-03    Creatinine Trend: 0.80<--, 0.83<--                        11.1   9.13  )-----------( 270      ( 03 Oct 2019 04:18 )             33.4     PT/INR - ( 03 Oct 2019 04:18 )   PT: 10.1 sec;   INR: 0.91 ratio         PTT - ( 03 Oct 2019 04:18 )  PTT:29.8 sec    Lipid Panel:   Cardiac Enzymes: CARDIAC MARKERS ( 03 Oct 2019 04:18 )  0.018 ng/mL / x     / x     / x     / 2.6 ng/mL      Serum Pro-Brain Natriuretic Peptide: 1892 pg/mL (10-03-19 @ 04:18)    RADIOLOGY: < from: Xray Chest 1 View- PORTABLE-Urgent (10.03.19 @ 04:28) >  IMPRESSION:  Compatible with CHF. Clinical correlation is suggested.    < end of copied text >    ECG [my interpretation]: 10/3/2019 @ 09:24: Sinus rhythm, nonspecific ST-T wave abnormality unchanged from 2018    ECHO: < from: Transthoracic Echocardiogram (06.04.18 @ 06:40) >  CONCLUSIONS:  1. Mitral annular calcification. Mild mitral regurgitation.  2. Calcified trileaflet aortic valve with normal opening. Mild aortic insufficiency.  3. Aortic Root: 3.4 cm.  4. Mild left atrial enlargement.  5. Normal left ventricular internal dimensions and wall  thicknesses.  6. Normal Left Ventricular Systolic Function,  (EF = 55 to 60%)  7. Grade II diastolic dysfunction.  8. Normal right atrium.  9. Normal right ventricular size and function.  10. RV systolic pressure is 31 mm Hg.  11. Normal tricuspid valve.  12. There is mild pulmonic regurgitation.  13. Normal pericardium with no pericardial effusion.    < end of copied text >

## 2019-10-03 NOTE — H&P ADULT - PROBLEM SELECTOR PLAN 3
CXR shows evidence of b/l congestion, however cannot r/o underlying pna as well  starting rocephin and zithromax  f/u procalcitonin  f/u flu panel  f/u sputum cx

## 2019-10-03 NOTE — ED ADULT NURSE NOTE - NS ED NURSE REPORT GIVEN DT
Med rec complete per pt at bedside  Allergies have been verified and updated  No ABX in the last 30 days           03-Oct-2019 07:37

## 2019-10-03 NOTE — H&P ADULT - NSHPPHYSICALEXAM_GEN_ALL_CORE
Vital Signs Last 24 Hrs  T(C): 36.8 (03 Oct 2019 07:13), Max: 36.8 (03 Oct 2019 07:13)  T(F): 98.3 (03 Oct 2019 07:13), Max: 98.3 (03 Oct 2019 07:13)  HR: 54 (03 Oct 2019 07:13) (54 - 59)  BP: 150/50 (03 Oct 2019 07:13) (150/50 - 177/67)  BP(mean): --  RR: 18 (03 Oct 2019 04:47) (16 - 18)  SpO2: 92% (03 Oct 2019 07:13) (92% - 96%)

## 2019-10-03 NOTE — ED PROVIDER NOTE - CARE PLAN
Principal Discharge DX:	Pulmonary vascular congestion  Secondary Diagnosis:	Hyponatremia  Secondary Diagnosis:	Chest pain, unspecified type

## 2019-10-03 NOTE — ED PROVIDER NOTE - OBJECTIVE STATEMENT
86 year old female PMh DM, HTN, myositis, OA, TIA coming in with 10 days of hypertension, and tonight started to  have substernal nonradiating chest pain and mild SOB. As per pts daughter has been giving extra doses of BP meds with minimal improvement of BP. Pt denies fevers, chills, sweats, cough, uri symptoms, abd pains, N/V/D/C, urinary complaints, back pains, dizziness.

## 2019-10-03 NOTE — H&P ADULT - PROBLEM SELECTOR PLAN 5
starting hss  f/u HbA1C  diabetic diet c/w hydralazine  c/w amlodipine  c/w cozaar  reducing lopressor dose for relative bradycardia  c/w IV lasix bid  monitor vitals

## 2019-10-03 NOTE — ED ADULT TRIAGE NOTE - CHIEF COMPLAINT QUOTE
Her blood pressure is high x 1 week, chest pain, shortness of breath today.  Chest pain subsided after NTG 0.4 mg sublingual administration by EMT.

## 2019-10-03 NOTE — CONSULT NOTE ADULT - ASSESSMENT
87 yo Eritrean-speaking F with dementia, HFpEF (EF 55-60% by echo 06/2018, GIIdd), DM, HTN, HLD, CAD s/p PCI (9 years ago) and PAD who presents with chest pain and dyspnea in the setting of acute, decompensated, likely diastolic HF exacerbation.    1. HFpEF: Patient is on furosemide 20mg IV BID; check Is/Os and daily weights  -Reasonable to repeat echo to assess for any changes    2. HTN: On amlodipine, hydralazine, metoprolol, and losartan at home  -Can continue regimen inpatient, with maintenance of hemodynamic parameters    3. HLD: Patient is on fenofibrate at home; can recheck lipid panel now but it was not impressive from 2018  Reasonable to be on statin (would decrease dose to 40mg), not sure why fenofibrate is needed as well (pending repeat lipid panel), would discontinue to decrease risk of myalgias and can be readdressed as OP with PMD    4. CAD s/p PCI: On aspirin, statin, beta blocker  -Negative cardiac enzymes    ***Note that this is a preliminary note and any recommendations should NOT be carried out until this note is finalized. *** 85 yo Mauritian-speaking F with dementia, HFpEF (EF 55-60% by echo 06/2018, GIIdd), DM, HTN, HLD, CAD s/p PCI (9 years ago) and PAD who presents with chest pain and dyspnea in the setting of acute, decompensated, likely diastolic HF exacerbation, likely due to dietary indiscretions.    1. HFpEF: Patient is on furosemide 20mg IV BID; check Is/Os and daily weights  -Reasonable to repeat echo to assess for any changes  Patient counselled on proper HF diet, encouraged to stop drinking    2. HTN: On amlodipine, hydralazine, metoprolol, and losartan at home  -Can continue regimen inpatient, with maintenance of hemodynamic parameters    3. HLD: Patient is on fenofibrate at home; can recheck lipid panel now but it was not impressive from 2018  Reasonable to be on statin (would decrease dose to 40mg), not sure why fenofibrate is needed as well (pending repeat lipid panel), would discontinue to decrease risk of myalgias and can be readdressed as OP with PMD    4. CAD s/p PCI: On aspirin, statin, beta blocker  -Negative cardiac enzymes

## 2019-10-03 NOTE — H&P ADULT - NSICDXPASTMEDICALHX_GEN_ALL_CORE_FT
PAST MEDICAL HISTORY:  (HFpEF) heart failure with preserved ejection fraction     Diabetes     HTN (hypertension)     Myositis on prednisone x 5 years    Osteoarthritis     TIA (transient ischemic attack)

## 2019-10-03 NOTE — H&P ADULT - PROBLEM SELECTOR PLAN 8
IMPROVE VTE Individual Risk Assessment          RISK                                                          Points  [  ] Previous VTE                                                3  [  ] Thrombophilia                                             2  [  ] Lower limb paralysis                                   2        (unable to hold up >15 seconds)    [  ] Current Cancer                                             2         (within 6 months)  [  X] Immobilization > 24 hrs                              1  [  ] ICU/CCU stay > 24 hours                             1  [ X ] Age > 60                                                         1    IMPROVE VTE Score: 2    lovenox subq for dvt ppx

## 2019-10-03 NOTE — H&P ADULT - PROBLEM SELECTOR PLAN 6
IMPROVE VTE Individual Risk Assessment          RISK                                                          Points  [  ] Previous VTE                                                3  [  ] Thrombophilia                                             2  [  ] Lower limb paralysis                                   2        (unable to hold up >15 seconds)    [  ] Current Cancer                                             2         (within 6 months)  [  X] Immobilization > 24 hrs                              1  [  ] ICU/CCU stay > 24 hours                             1  [ X ] Age > 60                                                         1    IMPROVE VTE Score: 2    lovenox subq for dvt ppx takes Janumet and glimepiride at home  starting hss  f/u HbA1C  diabetic diet

## 2019-10-03 NOTE — H&P ADULT - ATTENDING COMMENTS
Patient seen and examined ; case was discussed with the admitting resident    ROS: as in the HPI; all other ROS negative    SH and family history as above    Vital Signs Last 24 Hrs  T(C): 36.8 (03 Oct 2019 07:13), Max: 36.8 (03 Oct 2019 07:13)  T(F): 98.3 (03 Oct 2019 07:13), Max: 98.3 (03 Oct 2019 07:13)  HR: 54 (03 Oct 2019 07:13) (54 - 59)  BP: 150/50 (03 Oct 2019 07:13) (150/50 - 177/67)  BP(mean): --  RR: 18 (03 Oct 2019 04:47) (16 - 18)  SpO2: 92% (03 Oct 2019 07:13) (92% - 96%)    GEN: NAD  HEENT- normocephalic; mouth moist  CVS- S1S2+  LUNGS- clear to auscultation; no wheezing  ABD: Soft , nontender, nondistended, Bowel sounds are present  EXTREMITY: no calf tenderness, no cyanosis, no edema  NEURO: AAOx3; non focal neurologic exam; cranial nerves grossly intact  PSYCH: normal affect and behavior  BACK: no swelling or mass;   VASCULAR: ++ distal peripheral pulses  SKIN: warm and dry.       Labs Reviewed:                         11.1   9.13  )-----------( 270      ( 03 Oct 2019 04:18 )             33.4     10-03    124<L>  |  92<L>  |  19<H>  ----------------------------<  174<H>  4.3   |  25  |  0.83    Ca    9.1      03 Oct 2019 04:18    TPro  6.9  /  Alb  3.5  /  TBili  0.3  /  DBili  x   /  AST  27  /  ALT  25  /  AlkPhos  52  10-03    CARDIAC MARKERS ( 03 Oct 2019 04:18 )  0.018 ng/mL / x     / x     / x     / 2.6 ng/mL      Urinalysis Basic - ( 03 Oct 2019 04:59 )    Color: Yellow / Appearance: Clear / S.010 / pH: x  Gluc: x / Ketone: Negative  / Bili: Negative / Urobili: Negative   Blood: x / Protein: 100 / Nitrite: Negative   Leuk Esterase: Small / RBC: Negative /HPF / WBC 11-25 /HPF   Sq Epi: x / Non Sq Epi: Few /HPF / Bacteria: Moderate /HPF      PT/INR - ( 03 Oct 2019 04:18 )   PT: 10.1 sec;   INR: 0.91 ratio         PTT - ( 03 Oct 2019 04:18 )  PTT:29.8 sec  BNP: Serum Pro-Brain Natriuretic Peptide: 1892 pg/mL (10-03 @ 04:18)    MEDICATIONS  (STANDING):    MEDICATIONS  (PRN):      CXR reviewed   EKG Reviewed: lateral st depressions       85 y/o F with HFpEF, CAD s/p PCI  dementia, MAXIMUS, PVD s/p bilateral stents, AOCD admitted with hypoxic respiratory failure 2/2 pulmonary edema, possible CAP. Patient desaturated to 85% improved with 2L NC, given IV lasix in ED. Still having substernal chest pain.     1. Acute hypoxemic respiratory failure 2/2 pulmonary edema.       Plan of care discussed with patient ;  all questions and concerns were addressed.  Discussed with Patient's family Patient seen and examined ; case was discussed with the admitting resident    ROS: as in the HPI; all other ROS negative    SH and family history as above    Vital Signs Last 24 Hrs  T(C): 36.8 (03 Oct 2019 07:13), Max: 36.8 (03 Oct 2019 07:13)  T(F): 98.3 (03 Oct 2019 07:13), Max: 98.3 (03 Oct 2019 07:13)  HR: 54 (03 Oct 2019 07:13) (54 - 59)  BP: 150/50 (03 Oct 2019 07:13) (150/50 - 177/67)  BP(mean): --  RR: 18 (03 Oct 2019 04:47) (16 - 18)  SpO2: 92% (03 Oct 2019 07:13) (92% - 96%)    GEN: NAD  HEENT- normocephalic; mouth moist  CVS- S1S2+  LUNGS- clear to auscultation; no wheezing  ABD: Soft , nontender, nondistended, Bowel sounds are present  EXTREMITY: no calf tenderness, no cyanosis, no edema  NEURO: AAOx3; non focal neurologic exam; cranial nerves grossly intact  PSYCH: normal affect and behavior  BACK: no swelling or mass;   VASCULAR: ++ distal peripheral pulses  SKIN: warm and dry.       Labs Reviewed:                         11.1   9.13  )-----------( 270      ( 03 Oct 2019 04:18 )             33.4     10-03    124<L>  |  92<L>  |  19<H>  ----------------------------<  174<H>  4.3   |  25  |  0.83    Ca    9.1      03 Oct 2019 04:18    TPro  6.9  /  Alb  3.5  /  TBili  0.3  /  DBili  x   /  AST  27  /  ALT  25  /  AlkPhos  52  10-03    CARDIAC MARKERS ( 03 Oct 2019 04:18 )  0.018 ng/mL / x     / x     / x     / 2.6 ng/mL      Urinalysis Basic - ( 03 Oct 2019 04:59 )    Color: Yellow / Appearance: Clear / S.010 / pH: x  Gluc: x / Ketone: Negative  / Bili: Negative / Urobili: Negative   Blood: x / Protein: 100 / Nitrite: Negative   Leuk Esterase: Small / RBC: Negative /HPF / WBC 11-25 /HPF   Sq Epi: x / Non Sq Epi: Few /HPF / Bacteria: Moderate /HPF      PT/INR - ( 03 Oct 2019 04:18 )   PT: 10.1 sec;   INR: 0.91 ratio         PTT - ( 03 Oct 2019 04:18 )  PTT:29.8 sec  BNP: Serum Pro-Brain Natriuretic Peptide: 1892 pg/mL (10-03 @ 04:18)    CXR reviewed   EKG Reviewed: lateral st depressions, similar to previous       87 y/o F with HFpEF, CAD s/p PCI  dementia, MAXIMUS, PVD s/p bilateral stents, AOCD admitted with hypoxic respiratory failure 2/2 pulmonary edema, possible CAP. Patient desaturated to 85% improved with 2L NC, given IV lasix in ED. Still having substernal chest pain. Attempted to contact daughter at  but no answer. Patient limited historian 2/2 underlying dementia and language barrier. Hx taken from chart review and previous documentation.     1. Acute hypoxemic respiratory failure 2/2 pulmonary edema- possible acute on chronic HFpEF vs concomitant PNA, will check PCT, empirically treat for CAP, check flu, IV lasix, appreciate cardiology consultaiton   2. Possible acute on chronic diastolic CHF- Tele monitoring, repeat troponin, cont BB, arb, IV lasix for now   3. CAP- suspected, empirically treat, obtain sputum cx, legionella, strep pnuemo ag, can dc abx if w/u negative   4. CAD s/p PCI- stent placed around  per chart review, cont asa and plavix   5. H/o PVD- would hold pletal if she is still taking, cont asa, statin, plavix   6. H/o dementia- Avoid anticholinergics, benzodiazepines, limit lines/tubes/tethers/restraints, encourage family presence and frequent reorientation/reassurance by staff, encourage good sleep hygiene, adequate lighting  7. MAXIMUS- asymptomatic, was supposed to have surveillance arterial doppler, no focal deficits on exam, would verify with daughter when able.   8. UTI- covered with rocephin above   9. Hyponatremia- some volume overload on exam, intravascular volume depletion   10. DM  Plan of care discussed with patient ;  all questions and concerns were addressed. Patient seen and examined ; case was discussed with the admitting resident    ROS: as in the HPI; all other ROS negative    SH and family history as above    Vital Signs Last 24 Hrs  T(C): 36.8 (03 Oct 2019 07:13), Max: 36.8 (03 Oct 2019 07:13)  T(F): 98.3 (03 Oct 2019 07:13), Max: 98.3 (03 Oct 2019 07:13)  HR: 54 (03 Oct 2019 07:13) (54 - 59)  BP: 150/50 (03 Oct 2019 07:13) (150/50 - 177/67)  BP(mean): --  RR: 18 (03 Oct 2019 04:47) (16 - 18)  SpO2: 92% (03 Oct 2019 07:13) (92% - 96%)    GEN: NAD  HEENT- normocephalic; mouth moist, NC in place   CVS- S1S2+  LUNGS- b/l crackles   ABD: Soft , nontender, nondistended, Bowel sounds are present  EXTREMITY: no calf tenderness, no cyanosis, + edema  NEURO: non focal neurologic exam; cranial nerves grossly intact  PSYCH: normal affect and behavior  BACK: no swelling or mass;   VASCULAR: + distal peripheral pulses  SKIN: warm and dry.       Labs Reviewed:                         11.1   9.13  )-----------( 270      ( 03 Oct 2019 04:18 )             33.4     10-03    124<L>  |  92<L>  |  19<H>  ----------------------------<  174<H>  4.3   |  25  |  0.83    Ca    9.1      03 Oct 2019 04:18    TPro  6.9  /  Alb  3.5  /  TBili  0.3  /  DBili  x   /  AST  27  /  ALT  25  /  AlkPhos  52  10-03    CARDIAC MARKERS ( 03 Oct 2019 04:18 )  0.018 ng/mL / x     / x     / x     / 2.6 ng/mL      Urinalysis Basic - ( 03 Oct 2019 04:59 )    Color: Yellow / Appearance: Clear / S.010 / pH: x  Gluc: x / Ketone: Negative  / Bili: Negative / Urobili: Negative   Blood: x / Protein: 100 / Nitrite: Negative   Leuk Esterase: Small / RBC: Negative /HPF / WBC 11-25 /HPF   Sq Epi: x / Non Sq Epi: Few /HPF / Bacteria: Moderate /HPF      PT/INR - ( 03 Oct 2019 04:18 )   PT: 10.1 sec;   INR: 0.91 ratio         PTT - ( 03 Oct 2019 04:18 )  PTT:29.8 sec  BNP: Serum Pro-Brain Natriuretic Peptide: 1892 pg/mL (10-03 @ 04:18)    CXR reviewed   EKG Reviewed: lateral st depressions, similar to previous       87 y/o F with HFpEF, CAD s/p PCI  dementia, MAXIMUS, PVD s/p bilateral stents, AOCD admitted with hypoxic respiratory failure 2/2 pulmonary edema, possible CAP. Patient desaturated to 85% improved with 2L NC, given IV lasix in ED. Still having substernal chest pain. Attempted to contact daughter at  but no answer, daughter provided hx in ED. Patient limited historian 2/2 underlying dementia and language barrier even with  attempt. Hx taken from chart review and previous documentation.     1. Acute hypoxemic respiratory failure 2/2 pulmonary edema- possible acute on chronic HFpEF vs concomitant PNA, will check PCT, empirically treat for CAP, check flu, IV lasix, appreciate cardiology consultaiton   2. Possible acute on chronic diastolic CHF- Tele monitoring, repeat troponin, cont BB, arb, IV lasix for now   3. CAP- suspected, empirically treat, obtain sputum cx, legionella, strep pnuemo ag, can dc abx if w/u negative   4. CAD s/p PCI- stent placed around  per chart review, cont asa and plavix   5. H/o PVD- would hold pletal if she is still taking, cont asa, statin, plavix   6. H/o dementia- Avoid anticholinergics, benzodiazepines, limit lines/tubes/tethers/restraints, encourage family presence and frequent reorientation/reassurance by staff, encourage good sleep hygiene, adequate lighting  7. MAXIMUS- asymptomatic, was supposed to have surveillance arterial doppler, no focal deficits on exam, would verify with daughter when able.   8. UTI- covered with rocephin above   9. Hyponatremia- some volume overload on exam, intravascular volume depletion, recheck after lasix   10. DM  Plan of care discussed with patient ;  all questions and concerns were addressed.

## 2019-10-03 NOTE — H&P ADULT - PROBLEM SELECTOR PLAN 2
Na on admission 124  suspecting hypervolemic hyponatremia in setting of CHF exacerbation  s/p lasix 60mg IV push x1  f/u repeat BMP

## 2019-10-03 NOTE — ED ADULT NURSE REASSESSMENT NOTE - NS ED NURSE REASSESS COMMENT FT1
Pt received Stable on cardiac monitor.  Admitted to telemetry. Pt received Stable on cardiac monitor, laying calmly in bed..  Admitted to telemetry.

## 2019-10-04 LAB
ALBUMIN SERPL ELPH-MCNC: 3.2 G/DL — LOW (ref 3.5–5)
ALP SERPL-CCNC: 47 U/L — SIGNIFICANT CHANGE UP (ref 40–120)
ALT FLD-CCNC: 21 U/L DA — SIGNIFICANT CHANGE UP (ref 10–60)
ANION GAP SERPL CALC-SCNC: 8 MMOL/L — SIGNIFICANT CHANGE UP (ref 5–17)
ANION GAP SERPL CALC-SCNC: 9 MMOL/L — SIGNIFICANT CHANGE UP (ref 5–17)
ANION GAP SERPL CALC-SCNC: 9 MMOL/L — SIGNIFICANT CHANGE UP (ref 5–17)
AST SERPL-CCNC: 20 U/L — SIGNIFICANT CHANGE UP (ref 10–40)
BASOPHILS # BLD AUTO: 0.03 K/UL — SIGNIFICANT CHANGE UP (ref 0–0.2)
BASOPHILS NFR BLD AUTO: 0.6 % — SIGNIFICANT CHANGE UP (ref 0–2)
BILIRUB SERPL-MCNC: 0.3 MG/DL — SIGNIFICANT CHANGE UP (ref 0.2–1.2)
BUN SERPL-MCNC: 23 MG/DL — HIGH (ref 7–18)
BUN SERPL-MCNC: 24 MG/DL — HIGH (ref 7–18)
BUN SERPL-MCNC: 26 MG/DL — HIGH (ref 7–18)
CALCIUM SERPL-MCNC: 8.6 MG/DL — SIGNIFICANT CHANGE UP (ref 8.4–10.5)
CALCIUM SERPL-MCNC: 8.7 MG/DL — SIGNIFICANT CHANGE UP (ref 8.4–10.5)
CALCIUM SERPL-MCNC: 9.2 MG/DL — SIGNIFICANT CHANGE UP (ref 8.4–10.5)
CHLORIDE SERPL-SCNC: 89 MMOL/L — LOW (ref 96–108)
CHOLEST SERPL-MCNC: 180 MG/DL — SIGNIFICANT CHANGE UP (ref 10–199)
CO2 SERPL-SCNC: 26 MMOL/L — SIGNIFICANT CHANGE UP (ref 22–31)
CO2 SERPL-SCNC: 28 MMOL/L — SIGNIFICANT CHANGE UP (ref 22–31)
CO2 SERPL-SCNC: 29 MMOL/L — SIGNIFICANT CHANGE UP (ref 22–31)
CREAT SERPL-MCNC: 0.85 MG/DL — SIGNIFICANT CHANGE UP (ref 0.5–1.3)
CREAT SERPL-MCNC: 0.89 MG/DL — SIGNIFICANT CHANGE UP (ref 0.5–1.3)
CREAT SERPL-MCNC: 0.9 MG/DL — SIGNIFICANT CHANGE UP (ref 0.5–1.3)
CULTURE RESULTS: SIGNIFICANT CHANGE UP
EOSINOPHIL # BLD AUTO: 0.14 K/UL — SIGNIFICANT CHANGE UP (ref 0–0.5)
EOSINOPHIL NFR BLD AUTO: 2.7 % — SIGNIFICANT CHANGE UP (ref 0–6)
FOLATE SERPL-MCNC: 5.1 NG/ML — SIGNIFICANT CHANGE UP
GLUCOSE BLDC GLUCOMTR-MCNC: 152 MG/DL — HIGH (ref 70–99)
GLUCOSE BLDC GLUCOMTR-MCNC: 201 MG/DL — HIGH (ref 70–99)
GLUCOSE BLDC GLUCOMTR-MCNC: 206 MG/DL — HIGH (ref 70–99)
GLUCOSE BLDC GLUCOMTR-MCNC: 278 MG/DL — HIGH (ref 70–99)
GLUCOSE SERPL-MCNC: 165 MG/DL — HIGH (ref 70–99)
GLUCOSE SERPL-MCNC: 170 MG/DL — HIGH (ref 70–99)
GLUCOSE SERPL-MCNC: 225 MG/DL — HIGH (ref 70–99)
HBA1C BLD-MCNC: 7.4 % — HIGH (ref 4–5.6)
HCT VFR BLD CALC: 31.6 % — LOW (ref 34.5–45)
HDLC SERPL-MCNC: 58 MG/DL — SIGNIFICANT CHANGE UP
HGB BLD-MCNC: 10.3 G/DL — LOW (ref 11.5–15.5)
IMM GRANULOCYTES NFR BLD AUTO: 0.4 % — SIGNIFICANT CHANGE UP (ref 0–1.5)
LIPID PNL WITH DIRECT LDL SERPL: 90 MG/DL — SIGNIFICANT CHANGE UP
LYMPHOCYTES # BLD AUTO: 0.82 K/UL — LOW (ref 1–3.3)
LYMPHOCYTES # BLD AUTO: 16 % — SIGNIFICANT CHANGE UP (ref 13–44)
MAGNESIUM SERPL-MCNC: 1.9 MG/DL — SIGNIFICANT CHANGE UP (ref 1.6–2.6)
MCHC RBC-ENTMCNC: 25.6 PG — LOW (ref 27–34)
MCHC RBC-ENTMCNC: 32.6 GM/DL — SIGNIFICANT CHANGE UP (ref 32–36)
MCV RBC AUTO: 78.6 FL — LOW (ref 80–100)
MONOCYTES # BLD AUTO: 0.45 K/UL — SIGNIFICANT CHANGE UP (ref 0–0.9)
MONOCYTES NFR BLD AUTO: 8.8 % — SIGNIFICANT CHANGE UP (ref 2–14)
NEUTROPHILS # BLD AUTO: 3.65 K/UL — SIGNIFICANT CHANGE UP (ref 1.8–7.4)
NEUTROPHILS NFR BLD AUTO: 71.5 % — SIGNIFICANT CHANGE UP (ref 43–77)
NRBC # BLD: 0 /100 WBCS — SIGNIFICANT CHANGE UP (ref 0–0)
OSMOLALITY SERPL: 269 MOSMOL/KG — LOW (ref 280–301)
PHOSPHATE SERPL-MCNC: 4.3 MG/DL — SIGNIFICANT CHANGE UP (ref 2.5–4.5)
PLATELET # BLD AUTO: 237 K/UL — SIGNIFICANT CHANGE UP (ref 150–400)
POTASSIUM SERPL-MCNC: 3.5 MMOL/L — SIGNIFICANT CHANGE UP (ref 3.5–5.3)
POTASSIUM SERPL-MCNC: 3.6 MMOL/L — SIGNIFICANT CHANGE UP (ref 3.5–5.3)
POTASSIUM SERPL-MCNC: 3.7 MMOL/L — SIGNIFICANT CHANGE UP (ref 3.5–5.3)
POTASSIUM SERPL-SCNC: 3.5 MMOL/L — SIGNIFICANT CHANGE UP (ref 3.5–5.3)
POTASSIUM SERPL-SCNC: 3.6 MMOL/L — SIGNIFICANT CHANGE UP (ref 3.5–5.3)
POTASSIUM SERPL-SCNC: 3.7 MMOL/L — SIGNIFICANT CHANGE UP (ref 3.5–5.3)
PROT SERPL-MCNC: 6.6 G/DL — SIGNIFICANT CHANGE UP (ref 6–8.3)
RBC # BLD: 4.02 M/UL — SIGNIFICANT CHANGE UP (ref 3.8–5.2)
RBC # FLD: 13.7 % — SIGNIFICANT CHANGE UP (ref 10.3–14.5)
S PNEUM AG SER QL: SIGNIFICANT CHANGE UP
SODIUM SERPL-SCNC: 124 MMOL/L — LOW (ref 135–145)
SODIUM SERPL-SCNC: 126 MMOL/L — LOW (ref 135–145)
SODIUM SERPL-SCNC: 126 MMOL/L — LOW (ref 135–145)
SPECIMEN SOURCE: SIGNIFICANT CHANGE UP
TOTAL CHOLESTEROL/HDL RATIO MEASUREMENT: 3.1 RATIO — LOW (ref 3.3–7.1)
TRIGL SERPL-MCNC: 160 MG/DL — HIGH (ref 10–149)
TSH SERPL-MCNC: 2.94 UU/ML — SIGNIFICANT CHANGE UP (ref 0.34–4.82)
URATE SERPL-MCNC: 3.8 MG/DL — SIGNIFICANT CHANGE UP (ref 2.5–7)
VIT B12 SERPL-MCNC: 270 PG/ML — SIGNIFICANT CHANGE UP (ref 232–1245)
WBC # BLD: 5.11 K/UL — SIGNIFICANT CHANGE UP (ref 3.8–10.5)
WBC # FLD AUTO: 5.11 K/UL — SIGNIFICANT CHANGE UP (ref 3.8–10.5)

## 2019-10-04 PROCEDURE — 71045 X-RAY EXAM CHEST 1 VIEW: CPT | Mod: 26

## 2019-10-04 PROCEDURE — 99233 SBSQ HOSP IP/OBS HIGH 50: CPT

## 2019-10-04 RX ORDER — SODIUM CHLORIDE 9 MG/ML
1 INJECTION INTRAMUSCULAR; INTRAVENOUS; SUBCUTANEOUS
Refills: 0 | Status: DISCONTINUED | OUTPATIENT
Start: 2019-10-04 | End: 2019-10-08

## 2019-10-04 RX ORDER — HYDRALAZINE HCL 50 MG
50 TABLET ORAL THREE TIMES A DAY
Refills: 0 | Status: DISCONTINUED | OUTPATIENT
Start: 2019-10-04 | End: 2019-10-08

## 2019-10-04 RX ADMIN — Medication 50 MILLIGRAM(S): at 20:37

## 2019-10-04 RX ADMIN — ESCITALOPRAM OXALATE 5 MILLIGRAM(S): 10 TABLET, FILM COATED ORAL at 12:27

## 2019-10-04 RX ADMIN — Medication 1: at 17:21

## 2019-10-04 RX ADMIN — Medication 25 MILLIGRAM(S): at 17:21

## 2019-10-04 RX ADMIN — Medication 81 MILLIGRAM(S): at 12:27

## 2019-10-04 RX ADMIN — Medication 650 MILLIGRAM(S): at 10:46

## 2019-10-04 RX ADMIN — ENOXAPARIN SODIUM 40 MILLIGRAM(S): 100 INJECTION SUBCUTANEOUS at 12:28

## 2019-10-04 RX ADMIN — Medication 25 MILLIGRAM(S): at 05:40

## 2019-10-04 RX ADMIN — MEMANTINE HYDROCHLORIDE 10 MILLIGRAM(S): 10 TABLET ORAL at 21:30

## 2019-10-04 RX ADMIN — Medication 20 MILLIGRAM(S): at 17:21

## 2019-10-04 RX ADMIN — AMLODIPINE BESYLATE 10 MILLIGRAM(S): 2.5 TABLET ORAL at 05:40

## 2019-10-04 RX ADMIN — CEFTRIAXONE 100 MILLIGRAM(S): 500 INJECTION, POWDER, FOR SOLUTION INTRAMUSCULAR; INTRAVENOUS at 05:40

## 2019-10-04 RX ADMIN — Medication 2: at 08:21

## 2019-10-04 RX ADMIN — ATORVASTATIN CALCIUM 80 MILLIGRAM(S): 80 TABLET, FILM COATED ORAL at 12:27

## 2019-10-04 RX ADMIN — Medication 145 MILLIGRAM(S): at 12:28

## 2019-10-04 RX ADMIN — Medication 650 MILLIGRAM(S): at 09:42

## 2019-10-04 RX ADMIN — Medication 20 MILLIGRAM(S): at 05:41

## 2019-10-04 RX ADMIN — Medication 3: at 12:27

## 2019-10-04 RX ADMIN — Medication 25 MILLIGRAM(S): at 21:30

## 2019-10-04 RX ADMIN — AZITHROMYCIN 255 MILLIGRAM(S): 500 TABLET, FILM COATED ORAL at 05:40

## 2019-10-04 NOTE — PROGRESS NOTE ADULT - ASSESSMENT
85 y/o female admitted to [facility] for acute on chronic CHF exacerbation with possible concomitant CAP.

## 2019-10-04 NOTE — PROGRESS NOTE ADULT - SUBJECTIVE AND OBJECTIVE BOX
PGY 1 Note discussed with supervising resident and primary attending    Patient is a 86y old  Female who presents with a chief complaint of chest pain (04 Oct 2019 12:19)      INTERVAL HPI/OVERNIGHT EVENTS: c/o chest discomfort, but improved since admission  MEDICATIONS  (STANDING):  amitriptyline 25 milliGRAM(s) Oral at bedtime  amLODIPine   Tablet 10 milliGRAM(s) Oral daily  aspirin  chewable 81 milliGRAM(s) Oral daily  atorvastatin 80 milliGRAM(s) Oral daily  azithromycin  IVPB 500 milliGRAM(s) IV Intermittent every 24 hours  cefTRIAXone   IVPB 1000 milliGRAM(s) IV Intermittent every 24 hours  enoxaparin Injectable 40 milliGRAM(s) SubCutaneous daily  escitalopram 5 milliGRAM(s) Oral daily  fenofibrate Tablet 145 milliGRAM(s) Oral daily  furosemide   Injectable 20 milliGRAM(s) IV Push two times a day  hydrALAZINE 25 milliGRAM(s) Oral three times a day  insulin lispro (HumaLOG) corrective regimen sliding scale   SubCutaneous three times a day before meals  memantine 10 milliGRAM(s) Oral at bedtime  metoprolol tartrate 25 milliGRAM(s) Oral two times a day    MEDICATIONS  (PRN):  acetaminophen   Tablet .. 650 milliGRAM(s) Oral every 6 hours PRN Moderate Pain (4 - 6)      __________________________________________________  REVIEW OF SYSTEMS:    CONSTITUTIONAL: No fever,   EYES: no acute visual disturbances  NECK: No pain or stiffness  RESPIRATORY: No cough; No shortness of breath  CARDIOVASCULAR: No chest pain, no palpitations  GASTROINTESTINAL: No pain. No nausea or vomiting; No diarrhea   NEUROLOGICAL: No headache or numbness, no tremors  MUSCULOSKELETAL: No joint pain, no muscle pain  GENITOURINARY: no dysuria, no frequency, no hesitancy  PSYCHIATRY: no depression , no anxiety  ALL OTHER  ROS negative        Vital Signs Last 24 Hrs  T(C): 36.9 (04 Oct 2019 11:49), Max: 37.1 (04 Oct 2019 05:25)  T(F): 98.4 (04 Oct 2019 11:49), Max: 98.8 (04 Oct 2019 08:03)  HR: 63 (04 Oct 2019 11:49) (59 - 70)  BP: 154/63 (04 Oct 2019 11:49) (131/40 - 197/43)  BP(mean): --  RR: 18 (04 Oct 2019 11:49) (16 - 18)  SpO2: 96% (04 Oct 2019 11:49) (94% - 99%)    ________________________________________________  PHYSICAL EXAM:  GENERAL: NAD  HEENT: Normocephalic;  conjunctivae and sclerae clear; moist mucous membranes;   NECK : supple  CHEST/LUNG: Clear to auscultation bilaterally with good air entry   HEART: S1 S2  regular; no murmurs, gallops or rubs  ABDOMEN: Soft, Nontender, Nondistended; Bowel sounds present  EXTREMITIES: no cyanosis; ; no calf tenderness, mild pitting b/l edema+  SKIN: warm and dry; no rash  NERVOUS SYSTEM:  Awake and alert; Oriented  to place, person and time ; no new deficits    _________________________________________________  LABS:                        10.3   5.11  )-----------( 237      ( 04 Oct 2019 07:14 )             31.6     10-04    126<L>  |  89<L>  |  24<H>  ----------------------------<  225<H>  3.6   |  28  |  0.90    Ca    8.7      04 Oct 2019 12:32  Phos  4.3     10-04  Mg     1.9     10-04    TPro  6.6  /  Alb  3.2<L>  /  TBili  0.3  /  DBili  x   /  AST  20  /  ALT  21  /  AlkPhos  47  10-04    PT/INR - ( 03 Oct 2019 04:18 )   PT: 10.1 sec;   INR: 0.91 ratio         PTT - ( 03 Oct 2019 04:18 )  PTT:29.8 sec  Urinalysis Basic - ( 03 Oct 2019 04:59 )    Color: Yellow / Appearance: Clear / S.010 / pH: x  Gluc: x / Ketone: Negative  / Bili: Negative / Urobili: Negative   Blood: x / Protein: 100 / Nitrite: Negative   Leuk Esterase: Small / RBC: Negative /HPF / WBC 11-25 /HPF   Sq Epi: x / Non Sq Epi: Few /HPF / Bacteria: Moderate /HPF      CAPILLARY BLOOD GLUCOSE      POCT Blood Glucose.: 278 mg/dL (04 Oct 2019 11:34)  POCT Blood Glucose.: 201 mg/dL (04 Oct 2019 07:54)  POCT Blood Glucose.: 186 mg/dL (03 Oct 2019 21:10)        RADIOLOGY & ADDITIONAL TESTS:  < from: Transthoracic Echocardiogram (10.03.19 @ 08:53) >  ------------------------------------------------------------------------  CONCLUSIONS:  1. Mild posterior mitral annular calcification. Mild to  moderate mitral regurgitation.  2. Calcified trileaflet aortic valve with normal opening.  No aortic stenosis. Mild to moderate aortic regurgitation  ( msec).  3. Normal aortic root.  4. Severely dilated left atrium.  LA volume index = 54  cc/m2.  5. Normal left ventricular internal dimensions and wall  thicknesses.  6. Normal Left Ventricular Systolic Function,  (EF = 68% by  biplane)  7. Grade II diastolic dysfunction.  8. Normal right atrium.  9. Normal right ventricular size and systolic function  (TAPSE 2.1 cm).  10. RV systolic pressure is moderately increased at  46 mm  Hg.  11. Normal tricuspid valve. Moderate tricuspid  regurgitation.  12. Normal pulmonic valve. Trace pulmonic insufficiency is  noted.  13. No pericardial effusion.    < end of copied text >      < from: Xray Chest 1 View- PORTABLE-Routine (10.04.19 @ 13:49) >  Findings:  Lines: None    Heart/Mediastinum/Lungs: Cardiomegaly. Improved pulmonary vascular   congestion. Small bilateral pleural effusions. Left basilar airspace   disease.    < end of copied text >      Imaging Personally Reviewed:  YES/NO    Consultant(s) Notes Reviewed:   YES/ No    Care Discussed with Consultants :     Plan of care was discussed with patient and /or primary care giver; all questions and concerns were addressed and care was aligned with patient's wishes.

## 2019-10-04 NOTE — CHART NOTE - NSCHARTNOTEFT_GEN_A_CORE
We followed the BMP and repeat Sodium was 124. Dr Freeman was called but she in not familiar with pt yet. Pt has CHF and is already on fluid restriction. After discussing with Dr Martinez PGY 3, pt started on salt tabs 1g bid. Morning team to follow. We followed the BMP and repeat Sodium was 124. Dr Freeman was informed. Pt has CHF and is already on fluid restriction. After discussing with Dr Martinez PGY 3, pt started on salt tabs 1g bid. Will stop sertraline as SSRIs can cause SIADH. Morning team to follow.

## 2019-10-04 NOTE — PROGRESS NOTE ADULT - PROBLEM SELECTOR PLAN 1
p/w hypoxia to 80s w/x CXR findings of b/l congestion  respiratory status improved s/p lasix 60mg IV push x1 in ED  VBG does not suggest acid-base disturbance  proBNP 1892 on admission  starting lasix 20mg IV push bid  monitor strict I/Os- today -250 10/04  O/E No visible distress, mild pedal edema=, SPO2- 99% RA  daily weights  TTE 10/04 grade II diastolic dysfunction, EF 68%  1L fluid restriction  TROPSX3 - negative  asa, statin and bblocker, lasix, hydralazine  Hydralazine increased to 50mg TID  cardiology consulted - Dr. Martell p/w hypoxia to 80s w/x CXR findings of b/l congestion  respiratory status improved s/p lasix 60mg IV push x1 in ED  VBG does not suggest acid-base disturbance  proBNP 1892 on admission  starting lasix 20mg IV push bid  monitor strict I/Os- today -250 10/04  O/E No visible distress, mild pedal edema=, SPO2- 99% RA  d/c tele as per Dr. Martell  TTE 10/04 grade II diastolic dysfunction, EF 68%  1L fluid restriction  TROPSX3 - negative  asa, statin and bblocker, lasix, hydralazine  Hydralazine increased to 50mg TID  cardiology consulted - Dr. Martell

## 2019-10-04 NOTE — PHYSICAL THERAPY INITIAL EVALUATION ADULT - DIAGNOSIS, PT EVAL
Patient presented with pain on L LE, impairments in balance during ambulation. Patient also presented with headache and RN made aware

## 2019-10-04 NOTE — PROGRESS NOTE ADULT - ASSESSMENT
87 yo Bulgarian-speaking F with dementia, HFpEF (EF 55-60% by echo 06/2018, GIIdd), DM, HTN, HLD, CAD s/p PCI (9 years ago) and PAD who presents with chest pain and dyspnea in the setting of acute, decompensated, diastolic HF exacerbation, likely due to dietary indiscretions.    1. HFpEF: Patient is on furosemide 20mg IV BID; check Is/Os and daily weights  -Patient counselled on proper HF diet, encouraged to stop drinking    2. HTN: On amlodipine, hydralazine, metoprolol, and losartan at home  -Can increase hydralazine to 50mg PO Q8H to optimize BP control    3. HLD: Patient is on fenofibrate at home; would switch to atorvastatin 40mg; lipid panel does not require fenofibrate at this time    4. CAD s/p PCI: On aspirin, statin, beta blocker  -Negative cardiac enzymes    ***Note that this is a preliminary note and any recommendations should NOT be carried out until this note is finalized. *** 87 yo [place]guillan-speaking F with ?dementia, HFpEF (EF 55-60% by echo 06/2018, GIIdd), DM, HTN, HLD, CAD s/p PCI (9 years ago) and PAD who presents with chest pain and dyspnea in the setting of acute, decompensated, diastolic HF exacerbation, likely due to dietary indiscretions.    1. HFpEF: Patient is on furosemide 20mg IV BID; check Is/Os and daily weights  -Patient counselled on proper HF diet, encouraged to stop drinking    2. HTN: On amlodipine, hydralazine, metoprolol, and losartan at home  -Can increase hydralazine to 50mg PO Q8H to optimize BP control    3. HLD: Patient is on fenofibrate at home; would switch to atorvastatin 40mg; lipid panel does not require fenofibrate at this time    4. CAD s/p PCI: On aspirin, statin, beta blocker  -Negative cardiac enzymes

## 2019-10-04 NOTE — PROGRESS NOTE ADULT - SUBJECTIVE AND OBJECTIVE BOX
PRESENTING CC: Chest pain    SUBJ:     In summary, this is a 85 yo Syriac-speaking F with ? mild dementia, HFpEF (EF 55-60% by echo 06/2018, GIIdd), DM, HTN, HLD, CAD s/p PCI (9 years ago) and PAD who presents with chest pain and dyspnea due to acute decompensated diastolic Hf exacerbation in setting of dietary indiscretions.     Overnight, there were no acute events. Patient    ------------------------  PMH: As above, also TIA (transient ischemic attack), Myositis: on prednisone x 5 years, Osteoarthritis, anemia  No significant past surgical history    Allergies    No Known Allergies    MEDICATIONS  (STANDING):  amitriptyline 25 milliGRAM(s) Oral at bedtime  amLODIPine   Tablet 10 milliGRAM(s) Oral daily  aspirin  chewable 81 milliGRAM(s) Oral daily  atorvastatin 80 milliGRAM(s) Oral daily  azithromycin  IVPB 500 milliGRAM(s) IV Intermittent every 24 hours  cefTRIAXone   IVPB 1000 milliGRAM(s) IV Intermittent every 24 hours  enoxaparin Injectable 40 milliGRAM(s) SubCutaneous daily  escitalopram 5 milliGRAM(s) Oral daily  fenofibrate Tablet 145 milliGRAM(s) Oral daily  furosemide   Injectable 20 milliGRAM(s) IV Push two times a day  hydrALAZINE 25 milliGRAM(s) Oral three times a day  insulin lispro (HumaLOG) corrective regimen sliding scale   SubCutaneous three times a day before meals  memantine 10 milliGRAM(s) Oral at bedtime  metoprolol tartrate 25 milliGRAM(s) Oral two times a day    MEDICATIONS  (PRN):  acetaminophen   Tablet .. 650 milliGRAM(s) Oral every 6 hours PRN Moderate Pain (4 - 6)      FAMILY HISTORY:  No pertinent family history in first degree relatives    Reviewed; no change from my prior note    SOCIAL HISTORY:  Reviewed, no change from my prior note    REVIEW OF SYSTEMS:  Constitutional: [ ] fever, [ ]weight loss,  [ ]fatigue  Eyes: [ ] visual changes  Respiratory: [ ]shortness of breath;  [ ] cough, [ ]wheezing, [ ]chills, [ ]hemoptysis  Cardiovascular: [ ] chest pain, [ ]palpitations, [ ]dizziness,  [ ]leg swelling [ ]syncope  Gastrointestinal: [ ] abdominal pain, [ ]nausea, [ ]vomiting,  [ ]diarrhea   Genitourinary: [ ] dysuria, [ ] hematuria  Neurologic: [ ] headaches [ ] tremors [ ] weakness [ ] lightheadedness  Skin: [ ] itching, [ ]burning, [ ] rashes  Endocrine: [ ] heat or cold intolerance  Musculoskeletal: [ ] joint pain or swelling; [ ] muscle, back, or extremity pain  Psychiatric: [ ] depression, [ ]anxiety, [ ]mood swings, or [ ]difficulty sleeping  Hematologic: [ ] easy bruising, [ ] bleeding gums    [x] All remaining systems negative except as per above.   [  ] Unable to obtain    Vital Signs Last 24 Hrs  T(C): 36.9 (04 Oct 2019 11:49), Max: 37.1 (04 Oct 2019 05:25)  T(F): 98.4 (04 Oct 2019 11:49), Max: 98.8 (04 Oct 2019 08:03)  HR: 63 (04 Oct 2019 11:49) (52 - 70)  BP: 154/63 (04 Oct 2019 11:49) (131/40 - 197/43)  BP(mean): --  RR: 18 (04 Oct 2019 11:49) (16 - 18)  SpO2: 96% (04 Oct 2019 11:49) (94% - 99%)  I&O's Summary    03 Oct 2019 07:01  -  04 Oct 2019 07:00  --------------------------------------------------------  IN: 150 mL / OUT: 400 mL / NET: -250 mL    04 Oct 2019 07:01  -  04 Oct 2019 12:19  --------------------------------------------------------  IN: 160 mL / OUT: 0 mL / NET: 160 mL        PHYSICAL EXAM:  General: No acute distress  HEENT: EOMI, PERRL  Neck: Supple, No JVD  Lungs: Clear to auscultation bilaterally; No rales or wheezing  Heart: Regular rate and rhythm; No murmurs, rubs, or gallops  Abdomen: Nontender, bowel sounds present  Extremities: No clubbing, cyanosis, or edema  Nervous system:  Alert & Oriented X3, no focal deficits  Psychiatric: Normal affect  Skin: No rashes or lesions    LABS:  10-04    126<L>  |  89<L>  |  23<H>  ----------------------------<  165<H>  3.7   |  29  |  0.85    Ca    8.6      04 Oct 2019 07:14  Phos  4.3     10-04  Mg     1.9     10-04    TPro  6.6  /  Alb  3.2<L>  /  TBili  0.3  /  DBili  x   /  AST  20  /  ALT  21  /  AlkPhos  47  10-04    Creatinine Trend: 0.85<--, 0.92<--, 0.90<--, 0.80<--, 0.83<--                        10.3   5.11  )-----------( 237      ( 04 Oct 2019 07:14 )             31.6     PT/INR - ( 03 Oct 2019 04:18 )   PT: 10.1 sec;   INR: 0.91 ratio         PTT - ( 03 Oct 2019 04:18 )  PTT:29.8 sec  Lipid Panel:   Cardiac Enzymes: CARDIAC MARKERS ( 03 Oct 2019 19:42 )  <0.015 ng/mL / x     / 68 U/L / x     / 2.3 ng/mL  CARDIAC MARKERS ( 03 Oct 2019 10:33 )  <0.015 ng/mL / x     / x     / x     / x      CARDIAC MARKERS ( 03 Oct 2019 04:18 )  0.018 ng/mL / x     / x     / x     / 2.6 ng/mL      Serum Pro-Brain Natriuretic Peptide: 1892 pg/mL (10-03-19 @ 04:18)    TELEMETRY:    ECHO: < from: Transthoracic Echocardiogram (10.03.19 @ 08:53) >  CONCLUSIONS:  1. Mild posterior mitral annular calcification. Mild to  moderate mitral regurgitation.  2. Calcified trileaflet aortic valve with normal opening.  No aortic stenosis. Mild to moderate aortic regurgitation  ( msec).  3. Normal aortic root.  4. Severely dilated left atrium.  LA volume index = 54  cc/m2.  5. Normal left ventricular internal dimensions and wall  thicknesses.  6. Normal Left Ventricular Systolic Function,  (EF = 68% by  biplane)  7. Grade II diastolic dysfunction.  8. Normal right atrium.  9. Normal right ventricular size and systolic function  (TAPSE 2.1 cm).  10. RV systolic pressure is moderately increased at  46 mm  Hg.  11. Normal tricuspid valve. Moderate tricuspid  regurgitation.  12. Normal pulmonic valve. Trace pulmonic insufficiency is  noted.  13. No pericardial effusion.    *** Compared with echocardiogram report of 6/4/2018, no  significant changes noted.    < end of copied text > PRESENTING CC: Chest pain    SUBJ:     In summary, this is a 87 yo English-speaking F with ? mild dementia, HFpEF (EF 55-60% by echo 06/2018, GIIdd), DM, HTN, HLD, CAD s/p PCI (9 years ago) and PAD who presents with chest pain and dyspnea due to acute decompensated diastolic Hf exacerbation in setting of dietary indiscretions.     Overnight, there were no acute events. Patient reports she feels better than yesterday.     ------------------------  PMH: As above, also TIA (transient ischemic attack), Myositis: on prednisone x 5 years, Osteoarthritis, anemia  No significant past surgical history    Allergies    No Known Allergies    MEDICATIONS  (STANDING):  amitriptyline 25 milliGRAM(s) Oral at bedtime  amLODIPine   Tablet 10 milliGRAM(s) Oral daily  aspirin  chewable 81 milliGRAM(s) Oral daily  atorvastatin 80 milliGRAM(s) Oral daily  azithromycin  IVPB 500 milliGRAM(s) IV Intermittent every 24 hours  cefTRIAXone   IVPB 1000 milliGRAM(s) IV Intermittent every 24 hours  enoxaparin Injectable 40 milliGRAM(s) SubCutaneous daily  escitalopram 5 milliGRAM(s) Oral daily  fenofibrate Tablet 145 milliGRAM(s) Oral daily  furosemide   Injectable 20 milliGRAM(s) IV Push two times a day  hydrALAZINE 25 milliGRAM(s) Oral three times a day  insulin lispro (HumaLOG) corrective regimen sliding scale   SubCutaneous three times a day before meals  memantine 10 milliGRAM(s) Oral at bedtime  metoprolol tartrate 25 milliGRAM(s) Oral two times a day    MEDICATIONS  (PRN):  acetaminophen   Tablet .. 650 milliGRAM(s) Oral every 6 hours PRN Moderate Pain (4 - 6)      FAMILY HISTORY:  No pertinent family history in first degree relatives    Reviewed; no change from my prior note    SOCIAL HISTORY:  Reviewed, no change from my prior note    REVIEW OF SYSTEMS:  Constitutional: [ ] fever, [ ]weight loss,  [ ]fatigue  Eyes: [ ] visual changes  Respiratory: [ ]shortness of breath;  [ ] cough, [ ]wheezing, [ ]chills, [ ]hemoptysis  Cardiovascular: [ ] chest pain, [ ]palpitations, [ ]dizziness,  [ ]leg swelling [ ]syncope  Gastrointestinal: [ ] abdominal pain, [ ]nausea, [ ]vomiting,  [ ]diarrhea   Genitourinary: [ ] dysuria, [ ] hematuria  Neurologic: [ ] headaches [ ] tremors [ ] weakness [ ] lightheadedness  Skin: [ ] itching, [ ]burning, [ ] rashes  Endocrine: [ ] heat or cold intolerance  Musculoskeletal: [ ] joint pain or swelling; [ ] muscle, back, or extremity pain  Psychiatric: [ ] depression, [ ]anxiety, [ ]mood swings, or [ ]difficulty sleeping  Hematologic: [ ] easy bruising, [ ] bleeding gums    [x] All remaining systems negative except as per above.   [  ] Unable to obtain    Vital Signs Last 24 Hrs  T(C): 36.9 (04 Oct 2019 11:49), Max: 37.1 (04 Oct 2019 05:25)  T(F): 98.4 (04 Oct 2019 11:49), Max: 98.8 (04 Oct 2019 08:03)  HR: 63 (04 Oct 2019 11:49) (52 - 70)  BP: 154/63 (04 Oct 2019 11:49) (131/40 - 197/43)  BP(mean): --  RR: 18 (04 Oct 2019 11:49) (16 - 18)  SpO2: 96% (04 Oct 2019 11:49) (94% - 99%)  I&O's Summary    03 Oct 2019 07:01  -  04 Oct 2019 07:00  --------------------------------------------------------  IN: 150 mL / OUT: 400 mL / NET: -250 mL    04 Oct 2019 07:01  -  04 Oct 2019 12:19  --------------------------------------------------------  IN: 160 mL / OUT: 0 mL / NET: 160 mL        PHYSICAL EXAM:  General: No acute distress  HEENT: EOMI, PERRL  Neck: Supple, No JVD  Lungs: Clear to auscultation bilaterally; No rales or wheezing  Heart: Regular rate and rhythm; No murmurs, rubs, or gallops  Abdomen: Nontender, bowel sounds present  Extremities: No clubbing, cyanosis; trace LE edema  Nervous system:  Alert & Oriented X3, no focal deficits  Psychiatric: Normal affect  Skin: No rashes or lesions    LABS:  10-04    126<L>  |  89<L>  |  23<H>  ----------------------------<  165<H>  3.7   |  29  |  0.85    Ca    8.6      04 Oct 2019 07:14  Phos  4.3     10-04  Mg     1.9     10-04    TPro  6.6  /  Alb  3.2<L>  /  TBili  0.3  /  DBili  x   /  AST  20  /  ALT  21  /  AlkPhos  47  10-04    Creatinine Trend: 0.85<--, 0.92<--, 0.90<--, 0.80<--, 0.83<--                        10.3   5.11  )-----------( 237      ( 04 Oct 2019 07:14 )             31.6     PT/INR - ( 03 Oct 2019 04:18 )   PT: 10.1 sec;   INR: 0.91 ratio         PTT - ( 03 Oct 2019 04:18 )  PTT:29.8 sec  Lipid Panel:   Cardiac Enzymes: CARDIAC MARKERS ( 03 Oct 2019 19:42 )  <0.015 ng/mL / x     / 68 U/L / x     / 2.3 ng/mL  CARDIAC MARKERS ( 03 Oct 2019 10:33 )  <0.015 ng/mL / x     / x     / x     / x      CARDIAC MARKERS ( 03 Oct 2019 04:18 )  0.018 ng/mL / x     / x     / x     / 2.6 ng/mL      Serum Pro-Brain Natriuretic Peptide: 1892 pg/mL (10-03-19 @ 04:18)    TELEMETRY: Sinus rhythm, occasional PVCs, HR 60s-70s    ECHO: < from: Transthoracic Echocardiogram (10.03.19 @ 08:53) >  CONCLUSIONS:  1. Mild posterior mitral annular calcification. Mild to  moderate mitral regurgitation.  2. Calcified trileaflet aortic valve with normal opening.  No aortic stenosis. Mild to moderate aortic regurgitation  ( msec).  3. Normal aortic root.  4. Severely dilated left atrium.  LA volume index = 54  cc/m2.  5. Normal left ventricular internal dimensions and wall  thicknesses.  6. Normal Left Ventricular Systolic Function,  (EF = 68% by  biplane)  7. Grade II diastolic dysfunction.  8. Normal right atrium.  9. Normal right ventricular size and systolic function  (TAPSE 2.1 cm).  10. RV systolic pressure is moderately increased at  46 mm  Hg.  11. Normal tricuspid valve. Moderate tricuspid  regurgitation.  12. Normal pulmonic valve. Trace pulmonic insufficiency is  noted.  13. No pericardial effusion.    *** Compared with echocardiogram report of 6/4/2018, no  significant changes noted.    < end of copied text >

## 2019-10-05 LAB
ANION GAP SERPL CALC-SCNC: 8 MMOL/L — SIGNIFICANT CHANGE UP (ref 5–17)
BUN SERPL-MCNC: 28 MG/DL — HIGH (ref 7–18)
CALCIUM SERPL-MCNC: 9.1 MG/DL — SIGNIFICANT CHANGE UP (ref 8.4–10.5)
CHLORIDE SERPL-SCNC: 96 MMOL/L — SIGNIFICANT CHANGE UP (ref 96–108)
CO2 SERPL-SCNC: 25 MMOL/L — SIGNIFICANT CHANGE UP (ref 22–31)
CREAT ?TM UR-MCNC: 28 MG/DL — SIGNIFICANT CHANGE UP
CREAT SERPL-MCNC: 0.95 MG/DL — SIGNIFICANT CHANGE UP (ref 0.5–1.3)
GLUCOSE BLDC GLUCOMTR-MCNC: 136 MG/DL — HIGH (ref 70–99)
GLUCOSE BLDC GLUCOMTR-MCNC: 166 MG/DL — HIGH (ref 70–99)
GLUCOSE BLDC GLUCOMTR-MCNC: 222 MG/DL — HIGH (ref 70–99)
GLUCOSE BLDC GLUCOMTR-MCNC: 336 MG/DL — HIGH (ref 70–99)
GLUCOSE SERPL-MCNC: 169 MG/DL — HIGH (ref 70–99)
HCT VFR BLD CALC: 32.9 % — LOW (ref 34.5–45)
HGB BLD-MCNC: 10.7 G/DL — LOW (ref 11.5–15.5)
MAGNESIUM SERPL-MCNC: 1.9 MG/DL — SIGNIFICANT CHANGE UP (ref 1.6–2.6)
MCHC RBC-ENTMCNC: 25.9 PG — LOW (ref 27–34)
MCHC RBC-ENTMCNC: 32.5 GM/DL — SIGNIFICANT CHANGE UP (ref 32–36)
MCV RBC AUTO: 79.7 FL — LOW (ref 80–100)
NRBC # BLD: 0 /100 WBCS — SIGNIFICANT CHANGE UP (ref 0–0)
OSMOLALITY UR: 239 MOS/KG — SIGNIFICANT CHANGE UP (ref 50–1200)
PHOSPHATE SERPL-MCNC: 3.5 MG/DL — SIGNIFICANT CHANGE UP (ref 2.5–4.5)
PLATELET # BLD AUTO: 242 K/UL — SIGNIFICANT CHANGE UP (ref 150–400)
POTASSIUM SERPL-MCNC: 4.4 MMOL/L — SIGNIFICANT CHANGE UP (ref 3.5–5.3)
POTASSIUM SERPL-SCNC: 4.4 MMOL/L — SIGNIFICANT CHANGE UP (ref 3.5–5.3)
RBC # BLD: 4.13 M/UL — SIGNIFICANT CHANGE UP (ref 3.8–5.2)
RBC # FLD: 13.8 % — SIGNIFICANT CHANGE UP (ref 10.3–14.5)
SODIUM SERPL-SCNC: 129 MMOL/L — LOW (ref 135–145)
SODIUM UR-SCNC: 30 MMOL/L — LOW (ref 40–220)
WBC # BLD: 5.65 K/UL — SIGNIFICANT CHANGE UP (ref 3.8–10.5)
WBC # FLD AUTO: 5.65 K/UL — SIGNIFICANT CHANGE UP (ref 3.8–10.5)

## 2019-10-05 RX ADMIN — SODIUM CHLORIDE 1 GRAM(S): 9 INJECTION INTRAMUSCULAR; INTRAVENOUS; SUBCUTANEOUS at 17:52

## 2019-10-05 RX ADMIN — Medication 650 MILLIGRAM(S): at 07:02

## 2019-10-05 RX ADMIN — CEFTRIAXONE 100 MILLIGRAM(S): 500 INJECTION, POWDER, FOR SOLUTION INTRAMUSCULAR; INTRAVENOUS at 05:54

## 2019-10-05 RX ADMIN — SODIUM CHLORIDE 1 GRAM(S): 9 INJECTION INTRAMUSCULAR; INTRAVENOUS; SUBCUTANEOUS at 05:55

## 2019-10-05 RX ADMIN — Medication 650 MILLIGRAM(S): at 06:30

## 2019-10-05 RX ADMIN — Medication 1: at 11:47

## 2019-10-05 RX ADMIN — Medication 25 MILLIGRAM(S): at 05:55

## 2019-10-05 RX ADMIN — Medication 650 MILLIGRAM(S): at 20:48

## 2019-10-05 RX ADMIN — AMLODIPINE BESYLATE 10 MILLIGRAM(S): 2.5 TABLET ORAL at 05:55

## 2019-10-05 RX ADMIN — Medication 650 MILLIGRAM(S): at 19:37

## 2019-10-05 RX ADMIN — ATORVASTATIN CALCIUM 80 MILLIGRAM(S): 80 TABLET, FILM COATED ORAL at 11:46

## 2019-10-05 RX ADMIN — Medication 50 MILLIGRAM(S): at 14:33

## 2019-10-05 RX ADMIN — Medication 20 MILLIGRAM(S): at 17:52

## 2019-10-05 RX ADMIN — AZITHROMYCIN 255 MILLIGRAM(S): 500 TABLET, FILM COATED ORAL at 05:55

## 2019-10-05 RX ADMIN — Medication 81 MILLIGRAM(S): at 11:46

## 2019-10-05 RX ADMIN — ENOXAPARIN SODIUM 40 MILLIGRAM(S): 100 INJECTION SUBCUTANEOUS at 11:46

## 2019-10-05 RX ADMIN — Medication 2: at 07:57

## 2019-10-05 RX ADMIN — Medication 145 MILLIGRAM(S): at 11:46

## 2019-10-05 RX ADMIN — Medication 20 MILLIGRAM(S): at 05:55

## 2019-10-05 RX ADMIN — Medication 50 MILLIGRAM(S): at 05:55

## 2019-10-05 RX ADMIN — Medication 25 MILLIGRAM(S): at 17:52

## 2019-10-05 NOTE — PROGRESS NOTE ADULT - ASSESSMENT
85 y/o female admitted to [facility] for acute on chronic CHF exacerbation with possible concomitant CAP.

## 2019-10-05 NOTE — CONSULT NOTE ADULT - ASSESSMENT
CHF due to diastolic HF and severe left atrial enlargement.  Hyponatremia due to CHF , improved with diuretics  Increased ADH due to HF and possible SSRI that jerry DC.    Increased sodium in urine with 282 osmolality.        Repeat urine sodium and osmolality  Avoid SSRI at present.  Continue diuretics.

## 2019-10-05 NOTE — PROGRESS NOTE ADULT - SUBJECTIVE AND OBJECTIVE BOX
PGY 1 Note discussed with supervising resident and primary attending    Patient is a 86y old  Female who presents with a chief complaint of chest pain (05 Oct 2019 10:26)      INTERVAL HPI/OVERNIGHT EVENTS: Chest pain resolving  MEDICATIONS  (STANDING):  amitriptyline 25 milliGRAM(s) Oral at bedtime  amLODIPine   Tablet 10 milliGRAM(s) Oral daily  aspirin  chewable 81 milliGRAM(s) Oral daily  atorvastatin 80 milliGRAM(s) Oral daily  azithromycin  IVPB 500 milliGRAM(s) IV Intermittent every 24 hours  cefTRIAXone   IVPB 1000 milliGRAM(s) IV Intermittent every 24 hours  enoxaparin Injectable 40 milliGRAM(s) SubCutaneous daily  fenofibrate Tablet 145 milliGRAM(s) Oral daily  furosemide   Injectable 20 milliGRAM(s) IV Push two times a day  hydrALAZINE 50 milliGRAM(s) Oral three times a day  insulin lispro (HumaLOG) corrective regimen sliding scale   SubCutaneous three times a day before meals  memantine 10 milliGRAM(s) Oral at bedtime  metoprolol tartrate 25 milliGRAM(s) Oral two times a day  sodium chloride 1 Gram(s) Oral two times a day    MEDICATIONS  (PRN):  acetaminophen   Tablet .. 650 milliGRAM(s) Oral every 6 hours PRN Moderate Pain (4 - 6)      __________________________________________________  REVIEW OF SYSTEMS:    CONSTITUTIONAL: No fever,   EYES: no acute visual disturbances  NECK: No pain or stiffness  RESPIRATORY: No cough; No shortness of breath  CARDIOVASCULAR: MILD CHEST DISCOMFORT. IMPROVED SINCE ADMISSION  GASTROINTESTINAL: No pain. No nausea or vomiting; No diarrhea   NEUROLOGICAL: No headache or numbness, no tremors  MUSCULOSKELETAL: No joint pain, no muscle pain  GENITOURINARY: no dysuria, no frequency, no hesitancy  PSYCHIATRY: no depression , no anxiety  ALL OTHER  ROS negative        Vital Signs Last 24 Hrs  T(C): 36.4 (05 Oct 2019 11:23), Max: 36.8 (04 Oct 2019 15:34)  T(F): 97.6 (05 Oct 2019 11:23), Max: 98.2 (04 Oct 2019 15:34)  HR: 63 (05 Oct 2019 11:23) (63 - 74)  BP: 151/72 (05 Oct 2019 11:23) (114/72 - 203/61)  BP(mean): --  RR: 18 (05 Oct 2019 11:23) (18 - 18)  SpO2: 95% (05 Oct 2019 11:23) (95% - 98%)    ________________________________________________  PHYSICAL EXAM:  GENERAL: NAD  HEENT: Normocephalic;  conjunctivae and sclerae clear; moist mucous membranes;   NECK : supple  CHEST/LUNG: Clear to auscultation bilaterally with good air entry   HEART: S1 S2  regular; no murmurs, gallops or rubs  ABDOMEN: Soft, Nontender, Nondistended; Bowel sounds present  EXTREMITIES: no cyanosis; no edema; no calf tenderness  SKIN: warm and dry; no rash  NERVOUS SYSTEM:  Awake and alert; Oriented  to place, person and time ; no new deficits    _________________________________________________  LABS:                        10.7   5.65  )-----------( 242      ( 05 Oct 2019 07:42 )             32.9     10-05    129<L>  |  96  |  28<H>  ----------------------------<  169<H>  4.4   |  25  |  0.95    Ca    9.1      05 Oct 2019 07:42  Phos  3.5     10-05  Mg     1.9     10-05    TPro  6.6  /  Alb  3.2<L>  /  TBili  0.3  /  DBili  x   /  AST  20  /  ALT  21  /  AlkPhos  47  10-04        CAPILLARY BLOOD GLUCOSE      POCT Blood Glucose.: 166 mg/dL (05 Oct 2019 11:28)  POCT Blood Glucose.: 222 mg/dL (05 Oct 2019 07:30)  POCT Blood Glucose.: 206 mg/dL (04 Oct 2019 20:59)  POCT Blood Glucose.: 152 mg/dL (04 Oct 2019 16:35)  < from: Xray Chest 1 View- PORTABLE-Routine (10.04.19 @ 13:49) >  Findings:  Lines: None    Heart/Mediastinum/Lungs: Cardiomegaly. Improved pulmonary vascular   congestion. Small bilateral pleural effusions. Left basilar airspace   disease.    < end of copied text >        RADIOLOGY & ADDITIONAL TESTS:  < from: Transthoracic Echocardiogram (10.03.19 @ 08:53) >  CONCLUSIONS:  1. Mild posterior mitral annular calcification. Mild to  moderate mitral regurgitation.  2. Calcified trileaflet aortic valve with normal opening.  No aortic stenosis. Mild to moderate aortic regurgitation  ( msec).  3. Normal aortic root.  4. Severely dilated left atrium.  LA volume index = 54  cc/m2.  5. Normal left ventricular internal dimensions and wall  thicknesses.  6. Normal Left Ventricular Systolic Function,  (EF = 68% by  biplane)  7. Grade II diastolic dysfunction.  8. Normal right atrium.  9. Normal right ventricular size and systolic function  (TAPSE 2.1 cm).  10. RV systolic pressure is moderately increased at  46 mm  Hg.  11. Normal tricuspid valve. Moderate tricuspid  regurgitation.  12. Normal pulmonic valve. Trace pulmonic insufficiency is  noted.    < end of copied text >      Imaging Personally Reviewed:  YES/NO    Consultant(s) Notes Reviewed:   YES/ No    Care Discussed with Consultants :     Plan of care was discussed with patient and /or primary care giver; all questions and concerns were addressed and care was aligned with patient's wishes.

## 2019-10-05 NOTE — CONSULT NOTE ADULT - SUBJECTIVE AND OBJECTIVE BOX
Chief complain/HPI  87 y/o Armenian/Nepali-speaking female, AAOx2 at baseline, ambulates with cane and walker, lives with daughter, with PMHx of diastolic CHF (pEF), DM, HTN, dementia, b/l femoral stent and 1 cardiac stent placed 9 years ago, iron deficiency anemia presents to the ED with chief complaint of chest discomfort and dyspnea. Patient is an extremely poor historian and continues to say she is only here because she felt "sick in her heart". Attempted to reach daughter multiple times for collateral history without success. Patient was noted to be hypoxic in the 80s on admission, now maintaining O2 sat >92% on 2L NC after initial dose of lasix. She is in no acute distress.    Renal consult was called for hyponatremia.  Patient was placed on Lasix IV ,initial dose 60 mg and later 20  Patient on IV antibiotics, total D5W 300ml daily.    Derived Variables:  LVMI: 79 g/m2  RWT: 0.44  Ejection Fraction Rodriguez: 68 %  Peak Velocity (m/sec): AoV=1.6  ------------------------------------------------------------------------  OBSERVATIONS:  Mitral Valve: Mild posterior mitral annular calcification.  Mild to moderate mitral regurgitation.  Aortic Root: Normal aortic root.  Aortic Valve: Calcified trileaflet aortic valve with normal  opening. Noaortic stenosis. Mild to moderate aortic  regurgitation ( msec).  Left Atrium: Severely dilated left atrium.  LA volume index  = 54 cc/m2.  Left Ventricle: Normal Left Ventricular Systolic Function,  (EF = 68% by biplane) Not all LV wall segments were seen.  Normal left ventricular internal dimensions and wall  thicknesses. Grade II diastolic dysfunction.  Right Heart: Normal right atrium. Normal right ventricular  size and systolic function (TAPSE 2.1 cm). Normal tricuspid  valve. Moderate tricuspid regurgitation. Normal pulmonic  valve. Trace pulmonic insufficiency is noted.  Pericardium/PleuraNo pericardial effusion. A prominent  epicardial fat pad is noted.  Hemodynamic: RA Pressure is 8 mm Hg. RV systolic pressure  is moderately increased at  46 mm Hg.        PAST MEDICAL & SURGICAL HISTORY:  (HFpEF) heart failure with preserved ejection fraction  TIA (transient ischemic attack)  Myositis: on prednisone x 5 years  Osteoarthritis  HTN (hypertension)  Diabetes  No significant past surgical history      Home Medications Reviewed    Hospital Medications:   MEDICATIONS  (STANDING):  amitriptyline 25 milliGRAM(s) Oral at bedtime  amLODIPine   Tablet 10 milliGRAM(s) Oral daily  aspirin  chewable 81 milliGRAM(s) Oral daily  atorvastatin 80 milliGRAM(s) Oral daily  azithromycin  IVPB 500 milliGRAM(s) IV Intermittent every 24 hours  cefTRIAXone   IVPB 1000 milliGRAM(s) IV Intermittent every 24 hours  enoxaparin Injectable 40 milliGRAM(s) SubCutaneous daily  fenofibrate Tablet 145 milliGRAM(s) Oral daily  furosemide   Injectable 20 milliGRAM(s) IV Push two times a day  hydrALAZINE 50 milliGRAM(s) Oral three times a day  insulin lispro (HumaLOG) corrective regimen sliding scale   SubCutaneous three times a day before meals  memantine 10 milliGRAM(s) Oral at bedtime  metoprolol tartrate 25 milliGRAM(s) Oral two times a day  sodium chloride 1 Gram(s) Oral two times a day    MEDICATIONS  (PRN):  acetaminophen   Tablet .. 650 milliGRAM(s) Oral every 6 hours PRN Moderate Pain (4 - 6)      Allergies    No Known Allergies    Intolerances    xr chest improved ,   10/04 xr chest still with CHF                          10.7   5.65  )-----------( 242      ( 05 Oct 2019 07:42 )             32.9     10-05    129<L>  |  96  |  28<H>  ----------------------------<  169<H>  4.4   |  25  |  0.95    Ca    9.1      05 Oct 2019 07:42  Phos  3.5     10-05  Mg     1.9     10-05    TPro  6.6  /  Alb  3.2<L>  /  TBili  0.3  /  DBili  x   /  AST  20  /  ALT  21  /  AlkPhos  47  10-04      Chloride, Random Urine: 93 mmol/L (10-03 @ 22:23)  Osmolality, Random Urine: 282 mos/kg (10-03 @ 21:33)  Creatinine, Random Urine: 16 mg/dL (10-03 @ 21:33)  Sodium, Random Urine: 82 mmol/L (10-03 @ 21:33)        RADIOLOGY & ADDITIONAL STUDIES:    SOCIAL HISTORY: Denies ETOh,Smoking,     FAMILY HISTORY:  No pertinent family history in first degree relatives      REVIEW OF SYSTEMS:  dementia    VITALS:  Vital Signs Last 24 Hrs  T(C): 36.7 (05 Oct 2019 07:37), Max: 36.9 (04 Oct 2019 11:49)  T(F): 98 (05 Oct 2019 07:37), Max: 98.4 (04 Oct 2019 11:49)  HR: 71 (05 Oct 2019 07:37) (63 - 74)  BP: 124/53 (05 Oct 2019 07:37) (114/72 - 203/61)  BP(mean): --  RR: 18 (05 Oct 2019 07:37) (18 - 18)  SpO2: 98% (05 Oct 2019 07:37) (95% - 98%)    10-04 @ 07:01  -  10-05 @ 07:00  --------------------------------------------------------  IN: 320 mL / OUT: 0 mL / NET: 320 mL          PHYSICAL EXAM:  Constitutional: NAD  HEENT: in no respiratory distress,supine  Neck: No JVD  Respiratory: air entrance B/L, no wheezes, rales or rhonchi  Cardiovascular: S1, S2, RRR, no pericardial rub, no murmur  Gastrointestinal: BS+, soft, no tenderness, no distension, no bruit  Pelvis: bladder non-distended, no CVA tenderness  Extremities: No cyanosis or clubbing. No peripheral edema  Neurological: A/O x 3, no focal deficits

## 2019-10-05 NOTE — PROGRESS NOTE ADULT - PROBLEM SELECTOR PLAN 1
p/w hypoxia to 80s w/x CXR findings of b/l congestion  respiratory status improved s/p lasix 60mg IV push x1 in ED  VBG does not suggest acid-base disturbance  proBNP 1892 on admission  starting lasix 20mg IV push bid  monitor strict I/Os- today +70 10/05  O/E No visible distress, mild pedal edema=, SPO2- 95% RA  d/c tele as per Dr. Martell  TTE 10/04 grade II diastolic dysfunction, EF 68%  1L fluid restriction  TROPSX3 - negative  asa, statin and bblocker, lasix, hydralazine  Hydralazine increased to 50mg TID  cardiology consulted - Dr. Martell

## 2019-10-06 LAB
ANION GAP SERPL CALC-SCNC: 7 MMOL/L — SIGNIFICANT CHANGE UP (ref 5–17)
BUN SERPL-MCNC: 34 MG/DL — HIGH (ref 7–18)
CALCIUM SERPL-MCNC: 9.4 MG/DL — SIGNIFICANT CHANGE UP (ref 8.4–10.5)
CHLORIDE SERPL-SCNC: 97 MMOL/L — SIGNIFICANT CHANGE UP (ref 96–108)
CO2 SERPL-SCNC: 27 MMOL/L — SIGNIFICANT CHANGE UP (ref 22–31)
CREAT SERPL-MCNC: 0.96 MG/DL — SIGNIFICANT CHANGE UP (ref 0.5–1.3)
GLUCOSE BLDC GLUCOMTR-MCNC: 155 MG/DL — HIGH (ref 70–99)
GLUCOSE BLDC GLUCOMTR-MCNC: 164 MG/DL — HIGH (ref 70–99)
GLUCOSE BLDC GLUCOMTR-MCNC: 235 MG/DL — HIGH (ref 70–99)
GLUCOSE BLDC GLUCOMTR-MCNC: 267 MG/DL — HIGH (ref 70–99)
GLUCOSE BLDC GLUCOMTR-MCNC: 283 MG/DL — HIGH (ref 70–99)
GLUCOSE BLDC GLUCOMTR-MCNC: 307 MG/DL — HIGH (ref 70–99)
GLUCOSE SERPL-MCNC: 174 MG/DL — HIGH (ref 70–99)
HCT VFR BLD CALC: 32.9 % — LOW (ref 34.5–45)
HGB BLD-MCNC: 10.7 G/DL — LOW (ref 11.5–15.5)
MAGNESIUM SERPL-MCNC: 2.1 MG/DL — SIGNIFICANT CHANGE UP (ref 1.6–2.6)
MCHC RBC-ENTMCNC: 26.2 PG — LOW (ref 27–34)
MCHC RBC-ENTMCNC: 32.5 GM/DL — SIGNIFICANT CHANGE UP (ref 32–36)
MCV RBC AUTO: 80.6 FL — SIGNIFICANT CHANGE UP (ref 80–100)
NRBC # BLD: 0 /100 WBCS — SIGNIFICANT CHANGE UP (ref 0–0)
PHOSPHATE SERPL-MCNC: 3.8 MG/DL — SIGNIFICANT CHANGE UP (ref 2.5–4.5)
PLATELET # BLD AUTO: 258 K/UL — SIGNIFICANT CHANGE UP (ref 150–400)
POTASSIUM SERPL-MCNC: 4.4 MMOL/L — SIGNIFICANT CHANGE UP (ref 3.5–5.3)
POTASSIUM SERPL-SCNC: 4.4 MMOL/L — SIGNIFICANT CHANGE UP (ref 3.5–5.3)
RBC # BLD: 4.08 M/UL — SIGNIFICANT CHANGE UP (ref 3.8–5.2)
RBC # FLD: 14.3 % — SIGNIFICANT CHANGE UP (ref 10.3–14.5)
SODIUM SERPL-SCNC: 131 MMOL/L — LOW (ref 135–145)
WBC # BLD: 4.31 K/UL — SIGNIFICANT CHANGE UP (ref 3.8–10.5)
WBC # FLD AUTO: 4.31 K/UL — SIGNIFICANT CHANGE UP (ref 3.8–10.5)

## 2019-10-06 RX ORDER — INSULIN LISPRO 100/ML
4 VIAL (ML) SUBCUTANEOUS ONCE
Refills: 0 | Status: DISCONTINUED | OUTPATIENT
Start: 2019-10-06 | End: 2019-10-06

## 2019-10-06 RX ORDER — INSULIN LISPRO 100/ML
3 VIAL (ML) SUBCUTANEOUS ONCE
Refills: 0 | Status: COMPLETED | OUTPATIENT
Start: 2019-10-06 | End: 2019-10-06

## 2019-10-06 RX ADMIN — ATORVASTATIN CALCIUM 80 MILLIGRAM(S): 80 TABLET, FILM COATED ORAL at 11:41

## 2019-10-06 RX ADMIN — Medication 3 UNIT(S): at 23:21

## 2019-10-06 RX ADMIN — Medication 3: at 08:18

## 2019-10-06 RX ADMIN — SODIUM CHLORIDE 1 GRAM(S): 9 INJECTION INTRAMUSCULAR; INTRAVENOUS; SUBCUTANEOUS at 06:05

## 2019-10-06 RX ADMIN — Medication 81 MILLIGRAM(S): at 11:41

## 2019-10-06 RX ADMIN — MEMANTINE HYDROCHLORIDE 10 MILLIGRAM(S): 10 TABLET ORAL at 00:50

## 2019-10-06 RX ADMIN — ENOXAPARIN SODIUM 40 MILLIGRAM(S): 100 INJECTION SUBCUTANEOUS at 11:41

## 2019-10-06 RX ADMIN — Medication 20 MILLIGRAM(S): at 06:05

## 2019-10-06 RX ADMIN — Medication 50 MILLIGRAM(S): at 06:05

## 2019-10-06 RX ADMIN — Medication 25 MILLIGRAM(S): at 17:01

## 2019-10-06 RX ADMIN — Medication 20 MILLIGRAM(S): at 17:00

## 2019-10-06 RX ADMIN — Medication 25 MILLIGRAM(S): at 21:31

## 2019-10-06 RX ADMIN — Medication 1: at 17:00

## 2019-10-06 RX ADMIN — AMLODIPINE BESYLATE 10 MILLIGRAM(S): 2.5 TABLET ORAL at 06:05

## 2019-10-06 RX ADMIN — Medication 25 MILLIGRAM(S): at 06:17

## 2019-10-06 RX ADMIN — Medication 50 MILLIGRAM(S): at 13:25

## 2019-10-06 RX ADMIN — AZITHROMYCIN 255 MILLIGRAM(S): 500 TABLET, FILM COATED ORAL at 06:06

## 2019-10-06 RX ADMIN — SODIUM CHLORIDE 1 GRAM(S): 9 INJECTION INTRAMUSCULAR; INTRAVENOUS; SUBCUTANEOUS at 18:10

## 2019-10-06 RX ADMIN — MEMANTINE HYDROCHLORIDE 10 MILLIGRAM(S): 10 TABLET ORAL at 21:31

## 2019-10-06 RX ADMIN — Medication 25 MILLIGRAM(S): at 00:50

## 2019-10-06 RX ADMIN — Medication 145 MILLIGRAM(S): at 13:25

## 2019-10-06 RX ADMIN — Medication 50 MILLIGRAM(S): at 21:31

## 2019-10-06 RX ADMIN — CEFTRIAXONE 100 MILLIGRAM(S): 500 INJECTION, POWDER, FOR SOLUTION INTRAMUSCULAR; INTRAVENOUS at 06:06

## 2019-10-06 RX ADMIN — Medication 2: at 11:41

## 2019-10-06 RX ADMIN — Medication 50 MILLIGRAM(S): at 00:50

## 2019-10-06 NOTE — PROGRESS NOTE ADULT - ASSESSMENT
Hyponatremia due to Diastolic CHF, increased ADH and RAAS.  SSRI increased ADH and was stopped  Urine sodium improved  Serum sodium increased.    Increased urea due to diuretics.  Follow xr chest and if improved change diuretics to po

## 2019-10-06 NOTE — PROGRESS NOTE ADULT - SUBJECTIVE AND OBJECTIVE BOX
Problem List:  CHF and hyponatremia, improved with diuretics.  Diastolic HF  Severe dementia  PAST MEDICAL & SURGICAL HISTORY:  (HFpEF) heart failure with preserved ejection fraction  TIA (transient ischemic attack)  Myositis: on prednisone x 5 years  Osteoarthritis  HTN (hypertension)  Diabetes  No significant past surgical history      No Known Allergies      MEDICATIONS  (STANDING):  amitriptyline 25 milliGRAM(s) Oral at bedtime  amLODIPine   Tablet 10 milliGRAM(s) Oral daily  aspirin  chewable 81 milliGRAM(s) Oral daily  atorvastatin 80 milliGRAM(s) Oral daily  azithromycin  IVPB 500 milliGRAM(s) IV Intermittent every 24 hours  cefTRIAXone   IVPB 1000 milliGRAM(s) IV Intermittent every 24 hours  enoxaparin Injectable 40 milliGRAM(s) SubCutaneous daily  fenofibrate Tablet 145 milliGRAM(s) Oral daily  furosemide   Injectable 20 milliGRAM(s) IV Push two times a day  hydrALAZINE 50 milliGRAM(s) Oral three times a day  insulin lispro (HumaLOG) corrective regimen sliding scale   SubCutaneous three times a day before meals  memantine 10 milliGRAM(s) Oral at bedtime  metoprolol tartrate 25 milliGRAM(s) Oral two times a day  sodium chloride 1 Gram(s) Oral two times a day    MEDICATIONS  (PRN):  acetaminophen   Tablet .. 650 milliGRAM(s) Oral every 6 hours PRN Moderate Pain (4 - 6)    Creatinine, Serum: 0.95 mg/dL (10.05.19 @ 07:42)  Blood Urea Nitrogen, Serum: 34 mg/dL (10.06.19 @ 06:49)                              10.7   4.31  )-----------( 258      ( 06 Oct 2019 06:49 )             32.9     10-06    131<L>  |  97  |  34<H>  ----------------------------<  174<H>  4.4   |  27  |  0.96    Ca    9.4      06 Oct 2019 06:49  Phos  3.8     10-06  Mg     2.1     10-06    Repeat lytes osmolality 239 and sodium 30       Creatinine, Random Urine: 28 mg/dL (10-05 @ 18:09)  Sodium, Random Urine: 30 mmol/L (10-05 @ 18:09)  Osmolality, Random Urine: 239 mos/kg (10-05 @ 18:09)  Chloride, Random Urine: 93 mmol/L (10-03 @ 22:23)  Osmolality, Random Urine: 282 mos/kg (10-03 @ 21:33)  Creatinine, Random Urine: 16 mg/dL (10-03 @ 21:33)  Sodium, Random Urine: 82 mmol/L (10-03 @ 21:33)      REVIEW OF SYSTEMS:  severe dementia    VITALS:  T(F): 97.9 (10-06-19 @ 10:53), Max: 98.4 (10-06-19 @ 04:55)  HR: 66 (10-06-19 @ 10:53)  BP: 123/49 (10-06-19 @ 10:53)  RR: 18 (10-06-19 @ 10:53)  SpO2: 96% (10-06-19 @ 10:53)  Wt(kg): --      PHYSICAL EXAM:  Constitutional: well developed, no diaphoresis, no distress.  Neck: No JVD, no carotid bruit, supple, no adenopathy  Respiratory: Good air entrance B/L, no wheezes, rales or rhonchi  Cardiovascular: S1, S2, RRR, no pericardial rub, no murmur  Abdomen: BS+, soft, no tenderness, no bruit  Pelvis: bladder nondistended  Extremities: No cyanosis or clubbing. No peripheral edema.     dementia, non cooperative and confusesd.

## 2019-10-06 NOTE — CHART NOTE - NSCHARTNOTEFT_GEN_A_CORE
pt blood sugar was 307. blood sugars have been consistently high. HBA1C is 7.4. ordered 4 u of humalog.  Please review blood sugars and adjust insulin dosage as the patient is not on any standing orders for insulin

## 2019-10-06 NOTE — PROGRESS NOTE ADULT - SUBJECTIVE AND OBJECTIVE BOX
INTERVAL HPI/OVERNIGHT EVENTS:  seen at bedside,no acute issues  VITAL SIGNS:  T(F): 97.9 (10-06-19 @ 10:53)  HR: 66 (10-06-19 @ 10:53)  BP: 123/49 (10-06-19 @ 10:53)  RR: 18 (10-06-19 @ 10:53)  SpO2: 96% (10-06-19 @ 10:53)  Wt(kg): --    PHYSICAL EXAM:  awake,no dystress  Constitutional:  Eyes:  ENMT:perrla  Neck:  Respiratory:clear  Cardiovascular:s1 s2,m-none  Gastrointestinal:soft,bs pos  Extremities:  Vascular:  Neurological:no focal deficit  Musculoskeletal:    MEDICATIONS  (STANDING):  amitriptyline 25 milliGRAM(s) Oral at bedtime  amLODIPine   Tablet 10 milliGRAM(s) Oral daily  aspirin  chewable 81 milliGRAM(s) Oral daily  atorvastatin 80 milliGRAM(s) Oral daily  azithromycin  IVPB 500 milliGRAM(s) IV Intermittent every 24 hours  cefTRIAXone   IVPB 1000 milliGRAM(s) IV Intermittent every 24 hours  enoxaparin Injectable 40 milliGRAM(s) SubCutaneous daily  fenofibrate Tablet 145 milliGRAM(s) Oral daily  furosemide   Injectable 20 milliGRAM(s) IV Push two times a day  hydrALAZINE 50 milliGRAM(s) Oral three times a day  insulin lispro (HumaLOG) corrective regimen sliding scale   SubCutaneous three times a day before meals  memantine 10 milliGRAM(s) Oral at bedtime  metoprolol tartrate 25 milliGRAM(s) Oral two times a day  sodium chloride 1 Gram(s) Oral two times a day    MEDICATIONS  (PRN):  acetaminophen   Tablet .. 650 milliGRAM(s) Oral every 6 hours PRN Moderate Pain (4 - 6)      Allergies    No Known Allergies    Intolerances        LABS:                        10.7   4.31  )-----------( 258      ( 06 Oct 2019 06:49 )             32.9     10-06    131<L>  |  97  |  34<H>  ----------------------------<  174<H>  4.4   |  27  |  0.96    Ca    9.4      06 Oct 2019 06:49  Phos  3.8     10-06  Mg     2.1     10-06      Assessment and Plan:   · Assessment		  87 y/o female admitted to tele for acute on chronic CHF exacerbation with possible concomitant CAP.      Problem/Plan - 1:  ·  Problem: Acute on chronic diastolic (congestive) heart failure.-improved clinically  starting lasix 20mg IV push bid  monitor strict I/Os- today +70 10/05  d/c tele as per Dr. Martell  TTE 10/04 grade II diastolic dysfunction, EF 68%  1L fluid restriction  TROPSX3 - negative  asa, statin and bblocker, lasix, hydralazine  Hydralazine increased to 50mg TID  cardiology consulted - Dr. Martell.      Problem/Plan - 2:  ·  Problem: Hyponatremi-improving gradually  suspecting hypervolemic hyponatremia in setting of CHF exacerbation  pt started on 1L fluid restriction  nephro consult requested to Dr. Freeman- Likely 2/2 CHF  Urine osmolarity 282  f/u BMP  NEPHRO- Dr. Freeman.      Problem/Plan - 3:  ·  Problem: Community acquired pneumonia-imoproving clinically  starting rocephin and zithromax   flu panel- negative  f/u sputum cx.      Problem/Plan - 4:  ·  Problem: UTI (urinary tract infection).  Plan: UA positive   urine cx- urogenital rashard  c/w rocephin 1g qd.      Problem/Plan - 5:  ·  Problem: HTN (hypertension).  Plan: c/w hydralazine  c/w amlodipine  c/w cozaar  reducing lopressor dose for relative bradycardia  c/w IV lasix bid  monitor vitals.      Problem/Plan - 6:  Problem: Diabetes. Plan: takes Janumet and glimepiride at home  starting hss  f/u HbA1C  diabetic diet.     Problem/Plan - 7:  ·  Problem: Dementia.  Plan: c/w memantine  c/w home med amitriptyline  fall and aspiration precautions.      Problem/Plan - 8:  ·  Problem: Need for prophylactic measure.  Plan: IMPROVE VTE Individual Risk Assessment          RISK                                                          Points  [  ] Previous VTE                                                3  [  ] Thrombophilia                                             2  [  ] Lower limb paralysis                                   2        (unable to hold up >15 seconds)    [  ] Current Cancer                                             2         (within 6 months)  [  X] Immobilization > 24 hrs                              1  [  ] ICU/CCU stay > 24 hours                             1  [ X ] Age > 60                                                         1    IMPROVE VTE Score: 2    lovenox subq for dvt ppx.

## 2019-10-07 ENCOUNTER — TRANSCRIPTION ENCOUNTER (OUTPATIENT)
Age: 84
End: 2019-10-07

## 2019-10-07 LAB
ANION GAP SERPL CALC-SCNC: 8 MMOL/L — SIGNIFICANT CHANGE UP (ref 5–17)
BUN SERPL-MCNC: 38 MG/DL — HIGH (ref 7–18)
CALCIUM SERPL-MCNC: 9.5 MG/DL — SIGNIFICANT CHANGE UP (ref 8.4–10.5)
CHLORIDE SERPL-SCNC: 99 MMOL/L — SIGNIFICANT CHANGE UP (ref 96–108)
CO2 SERPL-SCNC: 27 MMOL/L — SIGNIFICANT CHANGE UP (ref 22–31)
CREAT SERPL-MCNC: 1.04 MG/DL — SIGNIFICANT CHANGE UP (ref 0.5–1.3)
GLUCOSE BLDC GLUCOMTR-MCNC: 128 MG/DL — HIGH (ref 70–99)
GLUCOSE BLDC GLUCOMTR-MCNC: 181 MG/DL — HIGH (ref 70–99)
GLUCOSE BLDC GLUCOMTR-MCNC: 253 MG/DL — HIGH (ref 70–99)
GLUCOSE BLDC GLUCOMTR-MCNC: 275 MG/DL — HIGH (ref 70–99)
GLUCOSE SERPL-MCNC: 180 MG/DL — HIGH (ref 70–99)
HCT VFR BLD CALC: 29.8 % — LOW (ref 34.5–45)
HGB BLD-MCNC: 9.6 G/DL — LOW (ref 11.5–15.5)
MAGNESIUM SERPL-MCNC: 2 MG/DL — SIGNIFICANT CHANGE UP (ref 1.6–2.6)
MCHC RBC-ENTMCNC: 26.2 PG — LOW (ref 27–34)
MCHC RBC-ENTMCNC: 32.2 GM/DL — SIGNIFICANT CHANGE UP (ref 32–36)
MCV RBC AUTO: 81.2 FL — SIGNIFICANT CHANGE UP (ref 80–100)
NRBC # BLD: 0 /100 WBCS — SIGNIFICANT CHANGE UP (ref 0–0)
PHOSPHATE SERPL-MCNC: 5 MG/DL — HIGH (ref 2.5–4.5)
PLATELET # BLD AUTO: 261 K/UL — SIGNIFICANT CHANGE UP (ref 150–400)
POTASSIUM SERPL-MCNC: 4.4 MMOL/L — SIGNIFICANT CHANGE UP (ref 3.5–5.3)
POTASSIUM SERPL-SCNC: 4.4 MMOL/L — SIGNIFICANT CHANGE UP (ref 3.5–5.3)
RBC # BLD: 3.67 M/UL — LOW (ref 3.8–5.2)
RBC # FLD: 14.3 % — SIGNIFICANT CHANGE UP (ref 10.3–14.5)
SODIUM SERPL-SCNC: 134 MMOL/L — LOW (ref 135–145)
WBC # BLD: 4.96 K/UL — SIGNIFICANT CHANGE UP (ref 3.8–10.5)
WBC # FLD AUTO: 4.96 K/UL — SIGNIFICANT CHANGE UP (ref 3.8–10.5)

## 2019-10-07 PROCEDURE — 73562 X-RAY EXAM OF KNEE 3: CPT | Mod: 26,LT

## 2019-10-07 PROCEDURE — 71045 X-RAY EXAM CHEST 1 VIEW: CPT | Mod: 26

## 2019-10-07 PROCEDURE — 73501 X-RAY EXAM HIP UNI 1 VIEW: CPT | Mod: 26,LT

## 2019-10-07 RX ORDER — INSULIN LISPRO 100/ML
2 VIAL (ML) SUBCUTANEOUS
Refills: 0 | Status: DISCONTINUED | OUTPATIENT
Start: 2019-10-07 | End: 2019-10-08

## 2019-10-07 RX ORDER — INSULIN GLARGINE 100 [IU]/ML
3 INJECTION, SOLUTION SUBCUTANEOUS AT BEDTIME
Refills: 0 | Status: DISCONTINUED | OUTPATIENT
Start: 2019-10-07 | End: 2019-10-08

## 2019-10-07 RX ORDER — FUROSEMIDE 40 MG
40 TABLET ORAL DAILY
Refills: 0 | Status: DISCONTINUED | OUTPATIENT
Start: 2019-10-07 | End: 2019-10-08

## 2019-10-07 RX ADMIN — ATORVASTATIN CALCIUM 80 MILLIGRAM(S): 80 TABLET, FILM COATED ORAL at 12:25

## 2019-10-07 RX ADMIN — SODIUM CHLORIDE 1 GRAM(S): 9 INJECTION INTRAMUSCULAR; INTRAVENOUS; SUBCUTANEOUS at 06:45

## 2019-10-07 RX ADMIN — Medication 650 MILLIGRAM(S): at 17:58

## 2019-10-07 RX ADMIN — Medication 50 MILLIGRAM(S): at 05:49

## 2019-10-07 RX ADMIN — Medication 25 MILLIGRAM(S): at 22:26

## 2019-10-07 RX ADMIN — Medication 3: at 12:26

## 2019-10-07 RX ADMIN — Medication 25 MILLIGRAM(S): at 17:18

## 2019-10-07 RX ADMIN — Medication 2 UNIT(S): at 12:26

## 2019-10-07 RX ADMIN — Medication 2 UNIT(S): at 17:18

## 2019-10-07 RX ADMIN — ENOXAPARIN SODIUM 40 MILLIGRAM(S): 100 INJECTION SUBCUTANEOUS at 12:25

## 2019-10-07 RX ADMIN — Medication 50 MILLIGRAM(S): at 14:07

## 2019-10-07 RX ADMIN — CEFTRIAXONE 100 MILLIGRAM(S): 500 INJECTION, POWDER, FOR SOLUTION INTRAMUSCULAR; INTRAVENOUS at 05:42

## 2019-10-07 RX ADMIN — Medication 81 MILLIGRAM(S): at 12:25

## 2019-10-07 RX ADMIN — MEMANTINE HYDROCHLORIDE 10 MILLIGRAM(S): 10 TABLET ORAL at 22:26

## 2019-10-07 RX ADMIN — Medication 3: at 08:40

## 2019-10-07 RX ADMIN — Medication 40 MILLIGRAM(S): at 14:07

## 2019-10-07 RX ADMIN — SODIUM CHLORIDE 1 GRAM(S): 9 INJECTION INTRAMUSCULAR; INTRAVENOUS; SUBCUTANEOUS at 17:18

## 2019-10-07 RX ADMIN — Medication 50 MILLIGRAM(S): at 22:26

## 2019-10-07 RX ADMIN — Medication 20 MILLIGRAM(S): at 05:50

## 2019-10-07 RX ADMIN — Medication 145 MILLIGRAM(S): at 14:07

## 2019-10-07 RX ADMIN — INSULIN GLARGINE 3 UNIT(S): 100 INJECTION, SOLUTION SUBCUTANEOUS at 22:26

## 2019-10-07 RX ADMIN — Medication 650 MILLIGRAM(S): at 18:41

## 2019-10-07 RX ADMIN — AZITHROMYCIN 255 MILLIGRAM(S): 500 TABLET, FILM COATED ORAL at 05:43

## 2019-10-07 RX ADMIN — Medication 25 MILLIGRAM(S): at 06:09

## 2019-10-07 RX ADMIN — AMLODIPINE BESYLATE 10 MILLIGRAM(S): 2.5 TABLET ORAL at 05:45

## 2019-10-07 NOTE — PROGRESS NOTE ADULT - PROBLEM SELECTOR PLAN 1
p/w hypoxia to 80s w/x CXR findings of b/l congestion  respiratory status improved s/p lasix 60mg IV push x1 in ED  proBNP 1892 on admission  lasix 20mg IV push bid - will switch to oral pending repeat cxr  monitor strict I/Os- today +70 10/05  O/E No visible distress, mild pedal edema=, SPO2- 95% RA  d/c tele as per Dr. Martell  TTE 10/04 grade II diastolic dysfunction, EF 68%  1L fluid restriction  TROPSX3 - negative  asa, statin and bblocker, lasix, hydralazine  Hydralazine increased to 50mg TID  cardiology consulted - Dr. Martell

## 2019-10-07 NOTE — PROGRESS NOTE ADULT - SUBJECTIVE AND OBJECTIVE BOX
Problem List:  CHF and hyponatremia  Fluid status improved  Diastolic CHF    PAST MEDICAL & SURGICAL HISTORY:  (HFpEF) heart failure with preserved ejection fraction  TIA (transient ischemic attack)  Myositis: on prednisone x 5 years  Osteoarthritis  HTN (hypertension)  Diabetes  No significant past surgical history      No Known Allergies      MEDICATIONS  (STANDING):  amitriptyline 25 milliGRAM(s) Oral at bedtime  amLODIPine   Tablet 10 milliGRAM(s) Oral daily  aspirin  chewable 81 milliGRAM(s) Oral daily  atorvastatin 80 milliGRAM(s) Oral daily  cefTRIAXone   IVPB 1000 milliGRAM(s) IV Intermittent every 24 hours  enoxaparin Injectable 40 milliGRAM(s) SubCutaneous daily  fenofibrate Tablet 145 milliGRAM(s) Oral daily  furosemide    Tablet 40 milliGRAM(s) Oral daily  hydrALAZINE 50 milliGRAM(s) Oral three times a day  insulin glargine Injectable (LANTUS) 3 Unit(s) SubCutaneous at bedtime  insulin lispro (HumaLOG) corrective regimen sliding scale   SubCutaneous three times a day before meals  insulin lispro Injectable (HumaLOG) 2 Unit(s) SubCutaneous three times a day before meals  memantine 10 milliGRAM(s) Oral at bedtime  metoprolol tartrate 25 milliGRAM(s) Oral two times a day  sodium chloride 1 Gram(s) Oral two times a day    MEDICATIONS  (PRN):  acetaminophen   Tablet .. 650 milliGRAM(s) Oral every 6 hours PRN Moderate Pain (4 - 6)                            9.6    4.96  )-----------( 261      ( 07 Oct 2019 05:51 )             29.8     10-07    134<L>  |  99  |  38<H>  ----------------------------<  180<H>  4.4   |  27  |  1.04    Ca    9.5      07 Oct 2019 05:51  Phos  5.0     10-07  Mg     2.0     10-07          Creatinine, Random Urine: 28 mg/dL (10-05 @ 18:09)  Sodium, Random Urine: 30 mmol/L (10-05 @ 18:09)  Osmolality, Random Urine: 239 mos/kg (10-05 @ 18:09)  Chloride, Random Urine: 93 mmol/L (10-03 @ 22:23)  Osmolality, Random Urine: 282 mos/kg (10-03 @ 21:33)  Creatinine, Random Urine: 16 mg/dL (10-03 @ 21:33)  Sodium, Random Urine: 82 mmol/L (10-03 @ 21:33)      REVIEW OF SYSTEMS:  severe dementia    VITALS:  T(F): 98.3 (10-07-19 @ 05:32), Max: 99 (10-06-19 @ 20:31)  HR: 75 (10-07-19 @ 05:32)  BP: 145/50 (10-07-19 @ 05:32)  RR: 18 (10-07-19 @ 05:32)  SpO2: 99% (10-07-19 @ 05:32)  Wt(kg): --      PHYSICAL EXAM:  Constitutional: well developed, no diaphoresis, no distress.  Neck: No JVD, no carotid bruit, supple, no adenopathy  Respiratory: Good air entrance B/L, no wheezes, rales or rhonchi  Cardiovascular: S1, S2, RRR, no pericardial rub, no murmur  Abdomen: BS+, soft, no tenderness, no bruit  Pelvis: bladder nondistended  Extremities: No cyanosis or clubbing. No peripheral edema.   Confused

## 2019-10-07 NOTE — DISCHARGE NOTE PROVIDER - PROVIDER TOKENS
PROVIDER:[TOKEN:[[id number]],FOLLOWUP:[1 week]],PROVIDER:[TOKEN:[[id number]],FOLLOWUP:[1 week]]

## 2019-10-07 NOTE — PROGRESS NOTE ADULT - ASSESSMENT
87 y/o female admitted to [facility] for acute on chronic CHF exacerbation with possible concomitant CAP.

## 2019-10-07 NOTE — PROGRESS NOTE ADULT - PROBLEM SELECTOR PLAN 1
-p/w hypoxia to 80s w/x CXR findings of b/l congestion  -respiratory status improved s/p lasix 60mg IV push x1 in ED  -proBNP 1892 on admission  -lasix 20mg IV push bid - will switch to oral pending repeat cxr  -monitor strict I/Os- today +70 10/05  -O/E No visible distress, mild pedal edema=, SPO2- 95% RA  -d/c tele as per Dr. Martell  -TTE 10/04 grade II diastolic dysfunction, EF 68%  -1L fluid restriction  -TROPSX3 - negative  -asa, statin and bblocker, lasix, hydralazine  -Hydralazine increased to 50mg TID  -cardiology consulted - Dr. Martell

## 2019-10-07 NOTE — DISCHARGE NOTE PROVIDER - NSDCCPCAREPLAN_GEN_ALL_CORE_FT
PRINCIPAL DISCHARGE DIAGNOSIS  Diagnosis: Diastolic CHF, acute on chronic  Assessment and Plan of Treatment: You were admitted for acutely worsening shortness of breath and leg swelling. You were found to have leg swelling as well as low oxygen saturation on admission (Acute Hypoxic Respiratory failure) and concern for exacerbation of Heart failure.   Your Echocardiogram showed an Ejection Fraction of 68% consistent with Heart failure with preserved EF.   Continue with blood pressure medications and Beta Blocker medication as indicated in the medication reconciliation. You will also continue to take a low dose Diuretic Furosemide (Lasix) to prevent fluid retention. Your Lasix has been prescribed once a day. If you feel short of breath or notice leg swelling, you should take it twice a day. If you feel dehydrated you may take it once a day.   Maintain healthy diet and excersise and lose weight.  If you eat too much salt or drink too much fluid, your body's water content may increase and make your heart work harder. This can worsen your CHF.  Follow up with your primary care physician in one week after discharge to inform of your hospitalization.      SECONDARY DISCHARGE DIAGNOSES  Diagnosis: Chest pain, unspecified type  Assessment and Plan of Treatment: You presented with chest pain and were admitted to ensure no cardiac cause. Your EKG showed normal sinus rhythm and your cardiac enzymes were negative when trended. You received an ECHO which showed your ejection fraction to be >55%. At this time you have no evidence of cardiac ischemia.   Please follow up with your primary care provider. You have been started on aspirin, a statin, and a beta blocker to reduce your risk of heart disease. Please take your medications as prescribed.    Diagnosis: Hyponatremia  Assessment and Plan of Treatment: You presented with low sodium, likely secondary to volume overload from heart failure. Please follow up with your primary care provider to ensure continued optimal management.    Diagnosis: Diabetes  Assessment and Plan of Treatment: Continue with your blood sugar medication. HbAIC was found to be ___ on admission.  You must maintain a healthy diet that consist of low sugar, low fat, low sodium diet. Exercise frequently if possible. Consider repeating your Hemoglobin A1c within 3 months after discharge to monitor your average blood glucose control. Follow up with primary care physician in one week after discharge.    Diagnosis: Dementia  Assessment and Plan of Treatment: Patient mental status fluctuates and needs constant supervision since family is having trouble taking care of her. No other organic causes were found during the admission like infections.    Diagnosis: HTN (hypertension)  Assessment and Plan of Treatment: Continue with blood pressure medication. Maintain a healthy diet that consist of low sugar, low fat, low sodium diet. Exercise frequently if possible.  Follow up with primary care physician in one week after discharge.

## 2019-10-07 NOTE — PROGRESS NOTE ADULT - SUBJECTIVE AND OBJECTIVE BOX
Medical Student Note discussed with supervising resident and primary attending.    Patient is a 86y old  Female who presents with a chief complaint of chest pain (07 Oct 2019 10:20)    INTERVAL HPI/OVERNIGHT EVENTS:  -Patient denies chest pain and dyspnea  -She complains of L hip and knee pain  -Awaiting CXR to check for improved pulm congestion  -Ortho consult to assess L hip and knee pain. Pending Xrays  -DC planning pending ortho clearance  -Patient is afebrile and hemodynamically stable    MEDICATIONS  (STANDING):  amitriptyline 25 milliGRAM(s) Oral at bedtime  amLODIPine   Tablet 10 milliGRAM(s) Oral daily  aspirin  chewable 81 milliGRAM(s) Oral daily  atorvastatin 80 milliGRAM(s) Oral daily  cefTRIAXone   IVPB 1000 milliGRAM(s) IV Intermittent every 24 hours  enoxaparin Injectable 40 milliGRAM(s) SubCutaneous daily  fenofibrate Tablet 145 milliGRAM(s) Oral daily  furosemide   Injectable 20 milliGRAM(s) IV Push two times a day  hydrALAZINE 50 milliGRAM(s) Oral three times a day  insulin glargine Injectable (LANTUS) 3 Unit(s) SubCutaneous at bedtime  insulin lispro (HumaLOG) corrective regimen sliding scale   SubCutaneous three times a day before meals  insulin lispro Injectable (HumaLOG) 2 Unit(s) SubCutaneous three times a day before meals  memantine 10 milliGRAM(s) Oral at bedtime  metoprolol tartrate 25 milliGRAM(s) Oral two times a day  sodium chloride 1 Gram(s) Oral two times a day    MEDICATIONS  (PRN):  acetaminophen   Tablet .. 650 milliGRAM(s) Oral every 6 hours PRN Moderate Pain (4 - 6)      Allergies    No Known Allergies    Intolerances        REVIEW OF SYSTEMS:  CONSTITUTIONAL: No fever, weight loss, or fatigue  RESPIRATORY: No cough, wheezing, chills or hemoptysis; No shortness of breath  CARDIOVASCULAR: No chest pain, palpitations, dizziness, or leg swelling  GASTROINTESTINAL: No abdominal or epigastric pain. No nausea, vomiting, or hematemesis; No diarrhea or constipation. No melena or hematochezia.  NEUROLOGICAL: No headaches, memory loss, loss of strength, numbness, or tremors  SKIN: No itching, burning, rashes, or lesions     Vital Signs Last 24 Hrs  T(C): 36.8 (07 Oct 2019 05:32), Max: 37.2 (06 Oct 2019 20:31)  T(F): 98.3 (07 Oct 2019 05:32), Max: 99 (06 Oct 2019 20:31)  HR: 75 (07 Oct 2019 05:32) (66 - 78)  BP: 145/50 (07 Oct 2019 05:32) (123/49 - 152/47)  BP(mean): --  RR: 18 (07 Oct 2019 05:32) (18 - 18)  SpO2: 99% (07 Oct 2019 05:32) (96% - 99%)    PHYSICAL EXAM:  GENERAL: NAD, well-groomed, well-developed  HEAD:  Atraumatic, Normocephalic  EYES: EOMI, PERRLA, conjunctiva and sclera clear  NECK: Supple, No JVD, Normal thyroid  CHEST/LUNG: Clear to percussion bilaterally; No rales, rhonchi, wheezing, or rubs  HEART: Regular rate and rhythm; No murmurs, rubs, or gallops  ABDOMEN: Soft, Nontender, Nondistended; Bowel sounds present  NERVOUS SYSTEM:  Alert & Oriented X3, Good concentration; Motor Strength 5/5 B/L   EXTREMITIES:  2+ Peripheral Pulses, No clubbing, cyanosis, or edema  SKIN;    LABS:                        9.6    4.96  )-----------( 261      ( 07 Oct 2019 05:51 )             29.8     10-07    134<L>  |  99  |  38<H>  ----------------------------<  180<H>  4.4   |  27  |  1.04    Ca    9.5      07 Oct 2019 05:51  Phos  5.0     10-07  Mg     2.0     10-07          CAPILLARY BLOOD GLUCOSE      POCT Blood Glucose.: 253 mg/dL (07 Oct 2019 08:29)  POCT Blood Glucose.: 283 mg/dL (06 Oct 2019 22:38)  POCT Blood Glucose.: 307 mg/dL (06 Oct 2019 21:16)  POCT Blood Glucose.: 155 mg/dL (06 Oct 2019 16:45)  POCT Blood Glucose.: 235 mg/dL (06 Oct 2019 11:28)      RADIOLOGY & ADDITIONAL TESTS:    Imaging Personally Reviewed:  [ ] YES  [ ] NO    Consultant(s) Notes Reviewed:  [ ] YES  [ ] NO Medical Student Note discussed with supervising resident and primary attending.    Patient is a 86y old  Female who presents with a chief complaint of chest pain (07 Oct 2019 10:20)    INTERVAL HPI/OVERNIGHT EVENTS:  -Patient endorses improving chest discomfort  -Patient denies dyspnea  -She complains of L hip and knee pain  -Awaiting CXR to check for improved pulm congestion  -Ortho consult to assess L hip and knee pain. Pending Xrays  -DC planning pending ortho clearance  -Patient is afebrile and hemodynamically stable    MEDICATIONS  (STANDING):  amitriptyline 25 milliGRAM(s) Oral at bedtime  amLODIPine   Tablet 10 milliGRAM(s) Oral daily  aspirin  chewable 81 milliGRAM(s) Oral daily  atorvastatin 80 milliGRAM(s) Oral daily  cefTRIAXone   IVPB 1000 milliGRAM(s) IV Intermittent every 24 hours  enoxaparin Injectable 40 milliGRAM(s) SubCutaneous daily  fenofibrate Tablet 145 milliGRAM(s) Oral daily  furosemide   Injectable 20 milliGRAM(s) IV Push two times a day  hydrALAZINE 50 milliGRAM(s) Oral three times a day  insulin glargine Injectable (LANTUS) 3 Unit(s) SubCutaneous at bedtime  insulin lispro (HumaLOG) corrective regimen sliding scale   SubCutaneous three times a day before meals  insulin lispro Injectable (HumaLOG) 2 Unit(s) SubCutaneous three times a day before meals  memantine 10 milliGRAM(s) Oral at bedtime  metoprolol tartrate 25 milliGRAM(s) Oral two times a day  sodium chloride 1 Gram(s) Oral two times a day    MEDICATIONS  (PRN):  acetaminophen   Tablet .. 650 milliGRAM(s) Oral every 6 hours PRN Moderate Pain (4 - 6)    Allergies    No Known Allergies    Intolerances    REVIEW OF SYSTEMS:  CONSTITUTIONAL: No fever, weight loss, or fatigue  RESPIRATORY: No cough, wheezing, chills or hemoptysis; No shortness of breath  CARDIOVASCULAR: Mild chest discomfort, palpitations, dizziness, or leg swelling  GASTROINTESTINAL: No abdominal or epigastric pain. No nausea, vomiting, or hematemesis; No diarrhea or constipation. No melena or hematochezia.  MUSCULOSKELETAL: L hip and knee pain  NEUROLOGICAL: No headaches, memory loss, loss of strength, numbness, or tremors;  SKIN: No itching, burning, rashes, or lesions     Vital Signs Last 24 Hrs  T(C): 36.8 (07 Oct 2019 05:32), Max: 37.2 (06 Oct 2019 20:31)  T(F): 98.3 (07 Oct 2019 05:32), Max: 99 (06 Oct 2019 20:31)  HR: 75 (07 Oct 2019 05:32) (66 - 78)  BP: 145/50 (07 Oct 2019 05:32) (123/49 - 152/47)  BP(mean): --  RR: 18 (07 Oct 2019 05:32) (18 - 18)  SpO2: 99% (07 Oct 2019 05:32) (96% - 99%)    PHYSICAL EXAM:  GENERAL: NAD, well-groomed, well-developed  HEAD:  Atraumatic, Normocephalic  EYES: EOMI, PERRLA, conjunctiva and sclera clear  NECK: Supple, No JVD, Normal thyroid  CHEST/LUNG: Clear to percussion bilaterally; No rales, rhonchi, wheezing, or rubs  HEART: Regular rate and rhythm; No murmurs, rubs, or gallops  ABDOMEN: Soft, Nontender, Nondistended; Bowel sounds present  NERVOUS SYSTEM: Alert & Oriented X3, Good concentration; Motor Strength 5/5 B/L   EXTREMITIES:  2+ Peripheral Pulses, No clubbing, cyanosis, or edema    LABS:                        9.6    4.96  )-----------( 261      ( 07 Oct 2019 05:51 )             29.8     10-07    134<L>  |  99  |  38<H>  ----------------------------<  180<H>  4.4   |  27  |  1.04    Ca    9.5      07 Oct 2019 05:51  Phos  5.0     10-07  Mg     2.0     10-07    CAPILLARY BLOOD GLUCOSE    POCT Blood Glucose.: 253 mg/dL (07 Oct 2019 08:29)  POCT Blood Glucose.: 283 mg/dL (06 Oct 2019 22:38)  POCT Blood Glucose.: 307 mg/dL (06 Oct 2019 21:16)  POCT Blood Glucose.: 155 mg/dL (06 Oct 2019 16:45)  POCT Blood Glucose.: 235 mg/dL (06 Oct 2019 11:28)      RADIOLOGY & ADDITIONAL TESTS:    Imaging Personally Reviewed:  [ ] YES  [ ] NO    Consultant(s) Notes Reviewed:  [ ] YES  [ ] NO

## 2019-10-07 NOTE — DISCHARGE NOTE PROVIDER - CARE PROVIDER_API CALL
Manfred Stark)  Internal Medicine  [address], [address]  [place] [address]  Phone: [phone]  Fax: [phone]  Follow Up Time: 1 week    Gianni Martell)  Cardiovascular Disease; Internal Medicine  [address]  [place] [address]  Phone: [phone]  Fax: [phone]  Follow Up Time: 1 week

## 2019-10-07 NOTE — PROGRESS NOTE ADULT - ASSESSMENT
87 y/o female w/ PMH of HFpEF, TIA, HTN, DM, and osteoarthritis presents w/ complaint of acute onset SOB and chest pain. 2 EKGs on 10/3 showed ST/T wave abnormalities indicating possible lateral ischemia. Troponinsx3 on 10/3 were (-). CXR 0n 10/3 and 10/4 showed pulmonary congestion. Echo on 10/3 showed grade II diastolic dysfunction w/ normal EF (68%). Patient admitted for CHF exacerbation.

## 2019-10-07 NOTE — PROGRESS NOTE ADULT - ASSESSMENT
Hyponatremia due to Diastolic CHF, increased ADH and RAAS.  SSRI increased ADH and was stopped  Urine sodium improved  Serum sodium increased.    Increased urea due to diuretics.  Follow xr chest and if improved change diuretics to po.    Continue fluid restrictoion

## 2019-10-07 NOTE — DISCHARGE NOTE PROVIDER - HOSPITAL COURSE
85 y/o [place]yrgyz/Telugu-speaking female, AAOx2 at baseline, ambulates with cane and walker, lives with daughter, with PMHx of diastolic CHF (pEF), DM, HTN, dementia, b/l femoral stent and 1 cardiac stent placed 9 years ago, iron deficiency anemia presented to the ED with chief complaint of chest discomfort and dyspnea. Patient is an extremely poor historian and continues to say she is only here because she felt "sick in her heart". Attempted to reach daughter multiple times for collateral history without success. Patient was noted to be hypoxic in the 80s on admission, now maintaining O2 sat >92% on 2L NC after initial dose of lasix. She is in no acute distress.        85 y/o female admitted to [facility] for acute on chronic CHF exacerbation with possible concomitant CAP. proBNP 1892 on admission.        Her respiratory status improved s/p lasix IV and she was switched to oral lasix on resolution of cxr consolidation. TTE showed grade II diastolic dysfunction, EF 68% with 1L fluid restriction. Cardiac enzymes were negative and her medications were adjusted appropriately.         She presented with hyponatremia, likely 2/2 hypervolemia which has resolved with improvement to her volume status.        She was started on antibiotics for pneumonia. She will continue her antibiotics for an additional ___ days.         Her home medications were continued and she was managed on insulin for her diabetes. 85 y/o Tongan/Belarusian-speaking female, AAOx2 at baseline, ambulates with cane and walker, lives with daughter, with PMHx of diastolic CHF (pEF), DM, HTN, dementia, b/l femoral stent and 1 cardiac stent placed 9 years ago, iron deficiency anemia presented to the ED with chief complaint of chest discomfort and dyspnea. Patient is an extremely poor historian and continues to say she is only here because she felt "sick in her heart". Attempted to reach daughter multiple times for collateral history without success. Patient was noted to be hypoxic in the 80s on admission, now maintaining O2 sat >92% on 2L NC after initial dose of lasix. She is in no acute distress.        85 y/o female admitted to [facility] for acute on chronic CHF exacerbation with possible concomitant CAP. pro-BNP 1892 on admission.        Her respiratory status improved s/p lasix IV and she was switched to oral lasix on resolution of cxr consolidation. TTE showed grade II diastolic dysfunction, EF 68% with 1L fluid restriction. Cardiac enzymes were negative and her medications were adjusted appropriately.         She presented with hyponatremia, likely 2/2 hypervolemia which has resolved with improvement to her volume status.        She was started on antibiotics for pneumonia. She will continue her antibiotics for an additional 2 days.         Her home medications were continued and she was managed on insulin for her diabetes.

## 2019-10-08 ENCOUNTER — TRANSCRIPTION ENCOUNTER (OUTPATIENT)
Age: 84
End: 2019-10-08

## 2019-10-08 VITALS — HEART RATE: 94 BPM | DIASTOLIC BLOOD PRESSURE: 65 MMHG | SYSTOLIC BLOOD PRESSURE: 115 MMHG

## 2019-10-08 LAB
GLUCOSE BLDC GLUCOMTR-MCNC: 180 MG/DL — HIGH (ref 70–99)
GLUCOSE BLDC GLUCOMTR-MCNC: 220 MG/DL — HIGH (ref 70–99)
GLUCOSE BLDC GLUCOMTR-MCNC: 251 MG/DL — HIGH (ref 70–99)

## 2019-10-08 PROCEDURE — 96374 THER/PROPH/DIAG INJ IV PUSH: CPT

## 2019-10-08 PROCEDURE — 84156 ASSAY OF PROTEIN URINE: CPT

## 2019-10-08 PROCEDURE — 87631 RESP VIRUS 3-5 TARGETS: CPT

## 2019-10-08 PROCEDURE — 80048 BASIC METABOLIC PNL TOTAL CA: CPT

## 2019-10-08 PROCEDURE — 82436 ASSAY OF URINE CHLORIDE: CPT

## 2019-10-08 PROCEDURE — 73501 X-RAY EXAM HIP UNI 1 VIEW: CPT

## 2019-10-08 PROCEDURE — 36415 COLL VENOUS BLD VENIPUNCTURE: CPT

## 2019-10-08 PROCEDURE — 82553 CREATINE MB FRACTION: CPT

## 2019-10-08 PROCEDURE — 85610 PROTHROMBIN TIME: CPT

## 2019-10-08 PROCEDURE — 82607 VITAMIN B-12: CPT

## 2019-10-08 PROCEDURE — 83735 ASSAY OF MAGNESIUM: CPT

## 2019-10-08 PROCEDURE — 81001 URINALYSIS AUTO W/SCOPE: CPT

## 2019-10-08 PROCEDURE — 99285 EMERGENCY DEPT VISIT HI MDM: CPT | Mod: 25

## 2019-10-08 PROCEDURE — 83935 ASSAY OF URINE OSMOLALITY: CPT

## 2019-10-08 PROCEDURE — 71045 X-RAY EXAM CHEST 1 VIEW: CPT

## 2019-10-08 PROCEDURE — 86901 BLOOD TYPING SEROLOGIC RH(D): CPT

## 2019-10-08 PROCEDURE — 83036 HEMOGLOBIN GLYCOSYLATED A1C: CPT

## 2019-10-08 PROCEDURE — 82550 ASSAY OF CK (CPK): CPT

## 2019-10-08 PROCEDURE — 84300 ASSAY OF URINE SODIUM: CPT

## 2019-10-08 PROCEDURE — 87086 URINE CULTURE/COLONY COUNT: CPT

## 2019-10-08 PROCEDURE — 73562 X-RAY EXAM OF KNEE 3: CPT

## 2019-10-08 PROCEDURE — 83880 ASSAY OF NATRIURETIC PEPTIDE: CPT

## 2019-10-08 PROCEDURE — 80061 LIPID PANEL: CPT

## 2019-10-08 PROCEDURE — 80053 COMPREHEN METABOLIC PANEL: CPT

## 2019-10-08 PROCEDURE — 93005 ELECTROCARDIOGRAM TRACING: CPT

## 2019-10-08 PROCEDURE — 86850 RBC ANTIBODY SCREEN: CPT

## 2019-10-08 PROCEDURE — 84484 ASSAY OF TROPONIN QUANT: CPT

## 2019-10-08 PROCEDURE — 83930 ASSAY OF BLOOD OSMOLALITY: CPT

## 2019-10-08 PROCEDURE — 93306 TTE W/DOPPLER COMPLETE: CPT

## 2019-10-08 PROCEDURE — 85027 COMPLETE CBC AUTOMATED: CPT

## 2019-10-08 PROCEDURE — 83605 ASSAY OF LACTIC ACID: CPT

## 2019-10-08 PROCEDURE — 86900 BLOOD TYPING SEROLOGIC ABO: CPT

## 2019-10-08 PROCEDURE — 82962 GLUCOSE BLOOD TEST: CPT

## 2019-10-08 PROCEDURE — 84443 ASSAY THYROID STIM HORMONE: CPT

## 2019-10-08 PROCEDURE — 87899 AGENT NOS ASSAY W/OPTIC: CPT

## 2019-10-08 PROCEDURE — 82803 BLOOD GASES ANY COMBINATION: CPT

## 2019-10-08 PROCEDURE — 84550 ASSAY OF BLOOD/URIC ACID: CPT

## 2019-10-08 PROCEDURE — 82570 ASSAY OF URINE CREATININE: CPT

## 2019-10-08 PROCEDURE — 84100 ASSAY OF PHOSPHORUS: CPT

## 2019-10-08 PROCEDURE — 85730 THROMBOPLASTIN TIME PARTIAL: CPT

## 2019-10-08 PROCEDURE — 84145 PROCALCITONIN (PCT): CPT

## 2019-10-08 PROCEDURE — 82746 ASSAY OF FOLIC ACID SERUM: CPT

## 2019-10-08 RX ORDER — FUROSEMIDE 40 MG
1 TABLET ORAL
Qty: 30 | Refills: 0
Start: 2019-10-08 | End: 2019-11-06

## 2019-10-08 RX ORDER — ATORVASTATIN CALCIUM 80 MG/1
1 TABLET, FILM COATED ORAL
Qty: 30 | Refills: 0
Start: 2019-10-08 | End: 2019-11-06

## 2019-10-08 RX ORDER — AZITHROMYCIN 500 MG/1
2 TABLET, FILM COATED ORAL
Qty: 4 | Refills: 0
Start: 2019-10-08 | End: 2019-10-09

## 2019-10-08 RX ADMIN — AMLODIPINE BESYLATE 10 MILLIGRAM(S): 2.5 TABLET ORAL at 05:20

## 2019-10-08 RX ADMIN — Medication 145 MILLIGRAM(S): at 12:18

## 2019-10-08 RX ADMIN — Medication 2 UNIT(S): at 17:26

## 2019-10-08 RX ADMIN — Medication 2 UNIT(S): at 12:19

## 2019-10-08 RX ADMIN — Medication 40 MILLIGRAM(S): at 05:19

## 2019-10-08 RX ADMIN — Medication 25 MILLIGRAM(S): at 17:26

## 2019-10-08 RX ADMIN — Medication 2 UNIT(S): at 08:29

## 2019-10-08 RX ADMIN — Medication 1: at 08:29

## 2019-10-08 RX ADMIN — Medication 2: at 17:25

## 2019-10-08 RX ADMIN — CEFTRIAXONE 100 MILLIGRAM(S): 500 INJECTION, POWDER, FOR SOLUTION INTRAMUSCULAR; INTRAVENOUS at 05:19

## 2019-10-08 RX ADMIN — Medication 3: at 12:18

## 2019-10-08 RX ADMIN — Medication 50 MILLIGRAM(S): at 05:20

## 2019-10-08 RX ADMIN — SODIUM CHLORIDE 1 GRAM(S): 9 INJECTION INTRAMUSCULAR; INTRAVENOUS; SUBCUTANEOUS at 17:26

## 2019-10-08 RX ADMIN — SODIUM CHLORIDE 1 GRAM(S): 9 INJECTION INTRAMUSCULAR; INTRAVENOUS; SUBCUTANEOUS at 05:20

## 2019-10-08 RX ADMIN — ENOXAPARIN SODIUM 40 MILLIGRAM(S): 100 INJECTION SUBCUTANEOUS at 12:18

## 2019-10-08 RX ADMIN — ATORVASTATIN CALCIUM 80 MILLIGRAM(S): 80 TABLET, FILM COATED ORAL at 12:18

## 2019-10-08 RX ADMIN — Medication 81 MILLIGRAM(S): at 12:18

## 2019-10-08 RX ADMIN — Medication 25 MILLIGRAM(S): at 05:19

## 2019-10-08 NOTE — PROGRESS NOTE ADULT - PROBLEM SELECTOR PROBLEM 1
Acute on chronic diastolic (congestive) heart failure

## 2019-10-08 NOTE — PROGRESS NOTE ADULT - PROBLEM SELECTOR PLAN 4
-UA positive  -urine cx- urogenital rashard  -c/w rocephin 1g qd
UA positive   urine cx- urogenital rashard  c/w rocephin 1g qd
UA positive   urine cx- urogenital rashard  c/w rocephin 1g qd
UA positive  f/u urine cx  c/w rocephin 1g qd
-UA positive  -urine cx- urogenital rashard  -c/w rocephin 1g qd

## 2019-10-08 NOTE — PROGRESS NOTE ADULT - PROBLEM SELECTOR PLAN 1
-p/w hypoxia to 80s w/x CXR findings of b/l congestion  -respiratory status improved s/p lasix 60mg IV push x1 in ED  -proBNP 1892 on admission  -lasix 40mg oral daily  -monitor strict I/Os- today +70 10/7  -O/E No visible distress, mild pedal edema=, SPO2- 95% RA  -d/c tele as per Dr. Martell  -TTE 10/04 grade II diastolic dysfunction, EF 68%  -1L fluid restriction  -TROPSX3 - negative  -asa, statin and bblocker, lasix, hydralazine  -Hydralazine increased to 50mg TID  -CXR on 10/7 showed no evidence of pulm congestion  -cardiology consulted - Dr. Martell

## 2019-10-08 NOTE — PROGRESS NOTE ADULT - PROBLEM SELECTOR PLAN 5
-c/w hydralazine  -c/w amlodipine  -c/w cozaar  reducing lopressor dose for relative bradycardia  c/w IV lasix bid  monitor vitals
c/w hydralazine  c/w amlodipine  c/w cozaar  reducing lopressor dose for relative bradycardia  c/w IV lasix bid  monitor vitals
-c/w hydralazine  -c/w amlodipine  -c/w cozaar  reducing lopressor dose for relative bradycardia  c/w IV lasix bid  monitor vitals

## 2019-10-08 NOTE — PROGRESS NOTE ADULT - ASSESSMENT
85 y/o female w/ PMH of HFpEF, TIA, HTN, DM, and osteoarthritis presents w/ complaint of acute onset SOB and chest pain. 2 EKGs on 10/3 showed ST/T wave abnormalities indicating possible lateral ischemia. Troponinsx3 on 10/3 were (-). CXR on 10/3 and 10/4 showed pulmonary congestion. Echo on 10/3 showed grade II diastolic dysfunction w/ normal EF (68%). Patient admitted for CHF exacerbation.

## 2019-10-08 NOTE — PROGRESS NOTE ADULT - PROBLEM SELECTOR PLAN 7
-c/w memantine  -c/w home med amitriptyline  -fall and aspiration precautions
c/w memantine  c/w home med amitriptyline  fall and aspiration precautions
-c/w memantine  -c/w home med amitriptyline  -fall and aspiration precautions

## 2019-10-08 NOTE — PROGRESS NOTE ADULT - SUBJECTIVE AND OBJECTIVE BOX
Medical Student Note discussed with supervising resident and primary attending.    Patient is a 86y old  Female who presents with a chief complaint of chest pain (07 Oct 2019 12:01)    INTERVAL HPI/OVERNIGHT EVENTS:  -Patient denies chest pain and dyspnea  -She complains of headache and L hip and knee pain. Tylenol PRN  -CXR on 10/7 showed no evidence of pulm congestion  -XR L hip and L knee showed no evidence of fracture. Ortho consult to assess L hip and knee pain. DC planning pending ortho clearance  -Patient is afebrile and hemodynamically stable  -Patient denies difficulty urinating and passing bowel movements.    MEDICATIONS  (STANDING):  amitriptyline 25 milliGRAM(s) Oral at bedtime  amLODIPine   Tablet 10 milliGRAM(s) Oral daily  aspirin  chewable 81 milliGRAM(s) Oral daily  atorvastatin 80 milliGRAM(s) Oral daily  cefTRIAXone   IVPB 1000 milliGRAM(s) IV Intermittent every 24 hours  enoxaparin Injectable 40 milliGRAM(s) SubCutaneous daily  fenofibrate Tablet 145 milliGRAM(s) Oral daily  furosemide    Tablet 40 milliGRAM(s) Oral daily  hydrALAZINE 50 milliGRAM(s) Oral three times a day  insulin glargine Injectable (LANTUS) 3 Unit(s) SubCutaneous at bedtime  insulin lispro (HumaLOG) corrective regimen sliding scale   SubCutaneous three times a day before meals  insulin lispro Injectable (HumaLOG) 2 Unit(s) SubCutaneous three times a day before meals  memantine 10 milliGRAM(s) Oral at bedtime  metoprolol tartrate 25 milliGRAM(s) Oral two times a day  sodium chloride 1 Gram(s) Oral two times a day    MEDICATIONS  (PRN):  acetaminophen   Tablet .. 650 milliGRAM(s) Oral every 6 hours PRN Moderate Pain (4 - 6)    Allergies    No Known Allergies    Intolerances    REVIEW OF SYSTEMS:  CONSTITUTIONAL: No fever, weight loss, or fatigue  RESPIRATORY: No cough, wheezing, chills or hemoptysis; No shortness of breath  CARDIOVASCULAR: No chest pain, palpitations, dizziness, or leg swelling  GASTROINTESTINAL: No abdominal or epigastric pain. No nausea, vomiting, or hematemesis; No diarrhea or constipation. No melena or hematochezia.  MUSCULOSKELETAL: L hip and knee pain  NEUROLOGICAL: (+) headache; No memory loss, loss of strength, numbness, or tremors  SKIN: No itching, burning, rashes, or lesions     Vital Signs Last 24 Hrs  T(C): 36.3 (08 Oct 2019 05:07), Max: 37.2 (07 Oct 2019 20:44)  T(F): 97.4 (08 Oct 2019 05:07), Max: 98.9 (07 Oct 2019 20:44)  HR: 70 (08 Oct 2019 05:07) (70 - 90)  BP: 155/64 (08 Oct 2019 05:07) (147/72 - 155/64)  BP(mean): 97 (07 Oct 2019 20:44) (97 - 97)  RR: 17 (08 Oct 2019 05:07) (17 - 17)  SpO2: 95% (08 Oct 2019 05:07) (95% - 97%)    PHYSICAL EXAM:  GENERAL: NAD, well-groomed, well-developed  HEAD:  Atraumatic, Normocephalic  EYES: EOMI, PERRLA, conjunctiva and sclera clear  NECK: Supple, No JVD, Normal thyroid  CHEST/LUNG: Clear to percussion bilaterally; No rales, rhonchi, wheezing, or rubs  HEART: Regular rate and rhythm; No murmurs, rubs, or gallops  ABDOMEN: Soft, Nontender, Nondistended; Bowel sounds present  NERVOUS SYSTEM:  Alert & Oriented X3, Good concentration; Motor Strength 5/5 B/L   EXTREMITIES:  2+ Peripheral Pulses, No clubbing, cyanosis, or edema    LABS:                        9.6    4.96  )-----------( 261      ( 07 Oct 2019 05:51 )             29.8     10-07    134<L>  |  99  |  38<H>  ----------------------------<  180<H>  4.4   |  27  |  1.04    Ca    9.5      07 Oct 2019 05:51  Phos  5.0     10-07  Mg     2.0     10-07    CAPILLARY BLOOD GLUCOSE    POCT Blood Glucose.: 180 mg/dL (08 Oct 2019 08:09)  POCT Blood Glucose.: 181 mg/dL (07 Oct 2019 22:03)  POCT Blood Glucose.: 128 mg/dL (07 Oct 2019 16:48)  POCT Blood Glucose.: 275 mg/dL (07 Oct 2019 12:22)    RADIOLOGY & ADDITIONAL TESTS:    CXR on 10/7  IMPRESSION: There is no evidence for focal infiltrate, lobar   consolidation or gross pulmonary edema.    MONSTER SEVERINO   This document has been electronically signed. Oct  7 2019  2:30PM      XR L knee, 3 views  IMPRESSION: Bones unremarkable. Arterial calcifications and calcified   fibroids.    DEANDRA JOHNSTON M.D., ATTENDING RADIOLOGIST  This document has been electronically signed. Oct  7 2019  2:37PM      XR L hip, 1 view  IMPRESSION: Bones unremarkable. Arterial calcifications and calcified   fibroids.    DEANDRA JOHNSTON M.D., ATTENDING RADIOLOGIST  This document has been electronically signed. Oct  7 2019  2:37PM    Imaging Personally Reviewed:  [x] YES  [ ] NO    Consultant(s) Notes Reviewed:  [x] YES  [ ] NO Medical Student Note discussed with supervising resident and primary attending.    Patient is a 86y old  Female who presents with a chief complaint of chest pain (07 Oct 2019 12:01)    INTERVAL HPI/OVERNIGHT EVENTS:  -Patient denies chest pain and dyspnea  -She complains of headache and L hip and knee pain. Tylenol PRN  -CXR on 10/7 showed no evidence of pulm congestion  -XR L hip and L knee showed no evidence of fracture.   -Patient is afebrile and hemodynamically stable  -Patient denies difficulty urinating and passing bowel movements.    MEDICATIONS  (STANDING):  amitriptyline 25 milliGRAM(s) Oral at bedtime  amLODIPine   Tablet 10 milliGRAM(s) Oral daily  aspirin  chewable 81 milliGRAM(s) Oral daily  atorvastatin 80 milliGRAM(s) Oral daily  cefTRIAXone   IVPB 1000 milliGRAM(s) IV Intermittent every 24 hours  enoxaparin Injectable 40 milliGRAM(s) SubCutaneous daily  fenofibrate Tablet 145 milliGRAM(s) Oral daily  furosemide    Tablet 40 milliGRAM(s) Oral daily  hydrALAZINE 50 milliGRAM(s) Oral three times a day  insulin glargine Injectable (LANTUS) 3 Unit(s) SubCutaneous at bedtime  insulin lispro (HumaLOG) corrective regimen sliding scale   SubCutaneous three times a day before meals  insulin lispro Injectable (HumaLOG) 2 Unit(s) SubCutaneous three times a day before meals  memantine 10 milliGRAM(s) Oral at bedtime  metoprolol tartrate 25 milliGRAM(s) Oral two times a day  sodium chloride 1 Gram(s) Oral two times a day    MEDICATIONS  (PRN):  acetaminophen   Tablet .. 650 milliGRAM(s) Oral every 6 hours PRN Moderate Pain (4 - 6)    Allergies    No Known Allergies    Intolerances    REVIEW OF SYSTEMS:  CONSTITUTIONAL: No fever, weight loss, or fatigue  RESPIRATORY: No cough, wheezing, chills or hemoptysis; No shortness of breath  CARDIOVASCULAR: No chest pain, palpitations, dizziness, or leg swelling  GASTROINTESTINAL: No abdominal or epigastric pain. No nausea, vomiting, or hematemesis; No diarrhea or constipation. No melena or hematochezia.  MUSCULOSKELETAL: L hip and knee pain  NEUROLOGICAL: (+) headache; No memory loss, loss of strength, numbness, or tremors  SKIN: No itching, burning, rashes, or lesions     Vital Signs Last 24 Hrs  T(C): 36.3 (08 Oct 2019 05:07), Max: 37.2 (07 Oct 2019 20:44)  T(F): 97.4 (08 Oct 2019 05:07), Max: 98.9 (07 Oct 2019 20:44)  HR: 70 (08 Oct 2019 05:07) (70 - 90)  BP: 155/64 (08 Oct 2019 05:07) (147/72 - 155/64)  BP(mean): 97 (07 Oct 2019 20:44) (97 - 97)  RR: 17 (08 Oct 2019 05:07) (17 - 17)  SpO2: 95% (08 Oct 2019 05:07) (95% - 97%)    PHYSICAL EXAM:  GENERAL: NAD, well-groomed, well-developed  HEAD:  Atraumatic, Normocephalic  EYES: EOMI, PERRLA, conjunctiva and sclera clear  NECK: Supple, No JVD, Normal thyroid  CHEST/LUNG: Clear to percussion bilaterally; No rales, rhonchi, wheezing, or rubs  HEART: Regular rate and rhythm; No murmurs, rubs, or gallops  ABDOMEN: Soft, Nontender, Nondistended; Bowel sounds present  NERVOUS SYSTEM:  Alert & Oriented X3, Good concentration; Motor Strength 5/5 B/L   EXTREMITIES:  2+ Peripheral Pulses, No clubbing, cyanosis, or edema    LABS:                        9.6    4.96  )-----------( 261      ( 07 Oct 2019 05:51 )             29.8     10-07    134<L>  |  99  |  38<H>  ----------------------------<  180<H>  4.4   |  27  |  1.04    Ca    9.5      07 Oct 2019 05:51  Phos  5.0     10-07  Mg     2.0     10-07    CAPILLARY BLOOD GLUCOSE    POCT Blood Glucose.: 180 mg/dL (08 Oct 2019 08:09)  POCT Blood Glucose.: 181 mg/dL (07 Oct 2019 22:03)  POCT Blood Glucose.: 128 mg/dL (07 Oct 2019 16:48)  POCT Blood Glucose.: 275 mg/dL (07 Oct 2019 12:22)    RADIOLOGY & ADDITIONAL TESTS:    CXR on 10/7  IMPRESSION: There is no evidence for focal infiltrate, lobar   consolidation or gross pulmonary edema.    MONSTER SEVERINO   This document has been electronically signed. Oct  7 2019  2:30PM      XR L knee, 3 views  IMPRESSION: Bones unremarkable. Arterial calcifications and calcified   fibroids.    DEANDRA JOHNSTON M.D., ATTENDING RADIOLOGIST  This document has been electronically signed. Oct  7 2019  2:37PM      XR L hip, 1 view  IMPRESSION: Bones unremarkable. Arterial calcifications and calcified   fibroids.    DEANDRA JOHNSTON M.D., ATTENDING RADIOLOGIST  This document has been electronically signed. Oct  7 2019  2:37PM    Imaging Personally Reviewed:  [x] YES  [ ] NO    Consultant(s) Notes Reviewed:  [x] YES  [ ] NO

## 2019-10-08 NOTE — PROGRESS NOTE ADULT - PROBLEM SELECTOR PLAN 6
-takes Janumet and glimepiride at home  -starting hss  -f/u HbA1C  -diabetic diet
takes Janumet and glimepiride at home  starting hss  f/u HbA1C  diabetic diet
-takes Janumet and glimepiride at home  -starting hss  -f/u HbA1C  -diabetic diet

## 2019-10-08 NOTE — PROGRESS NOTE ADULT - PROBLEM SELECTOR PROBLEM 3
Community acquired pneumonia

## 2019-10-08 NOTE — DISCHARGE NOTE NURSING/CASE MANAGEMENT/SOCIAL WORK - PATIENT PORTAL LINK FT
You can access the FollowMyHealth Patient Portal offered by [facility] by registering at the following website: [email or web address]. By joining "[email or web address]’s FollowMyHealth portal, you will also be able to view your health information using other applications (apps) compatible with our system.

## 2019-10-08 NOTE — PROGRESS NOTE ADULT - PROBLEM SELECTOR PLAN 3
-CXR shows evidence of b/l congestion, however cannot r/o underlying pna as well  -procalcitonin 0.06  -flu panel (-)  -serum S pneumo antigen (-)  -f/u sputum culture  -c/w ceftriaxone
CXR shows evidence of b/l congestion, however cannot r/o underlying pna as well  starting rocephin and zithromax   procalcitonin 0.06   flu panel- negative  f/u sputum cx
CXR shows evidence of b/l congestion, however cannot r/o underlying pna as well  starting rocephin and zithromax   procalcitonin 0.06   flu panel- negative  f/u sputum cx
CXR shows evidence of b/l congestion, however cannot r/o underlying pna as well  starting rocephin and zithromax   procalcitonin 0.06  f/u flu panel  f/u sputum cx
-CXR shows evidence of b/l congestion, however cannot r/o underlying pna as well  -procalcitonin 0.06  -flu panel (-)  -serum S pneumo antigen (-)  -f/u sputum culture  -c/w ceftriaxone

## 2019-10-08 NOTE — PROGRESS NOTE ADULT - PROBLEM SELECTOR PLAN 2
-Na on admission 124 - RESOLVED  -suspecting hypervolemic hyponatremia in setting of CHF exacerbation  -1L fluid restriction  -Repeat Na- 134 (10/7)  -Pt started on salt tablets 1gr BD  -Urine osmolarity 282  -f/u BMP qd  -NEPHRO- Dr. Freeman
Na on admission 124  suspecting hypervolemic hyponatremia in setting of CHF exacerbation  s/p lasix 60mg IV push x1---> LASIX REDUCED TO 20MG IVBD  pt started on 1L fluid restriction  Repeat Na- 126  nephro consult requested to Dr. Freeman  f/u BMP
Na on admission 124  suspecting hypervolemic hyponatremia in setting of CHF exacerbation  s/p lasix 60mg IV push x1---> LASIX REDUCED TO 20MG IVBD  pt started on 1L fluid restriction  Repeat Na- 129 10/05  Pt started on salt tablets 1gr BD  nephro consult requested to Dr. Freeman- Likely 2/2 CHF  Urine osmolarity 282  f/u BMP  NEPHRO- Dr. Freeman
Na on admission 124 - RESOLVED  suspecting hypervolemic hyponatremia in setting of CHF exacerbation  1L fluid restriction  Repeat Na- 134 10/7  Pt started on salt tablets 1gr BD  Urine osmolarity 282  f/u BMP qd  NEPHRO- Dr. Freeman
-Na on admission 124 - RESOLVED  -suspecting hypervolemic hyponatremia in setting of CHF exacerbation  -1L fluid restriction  -Repeat Na- 134 (10/7)  -Pt started on salt tablets 1gr BD  -Urine osmolarity 282  -f/u BMP qd  -NEPHRO- Dr. Freeman

## 2020-01-01 ENCOUNTER — APPOINTMENT (OUTPATIENT)
Dept: INTERNAL MEDICINE | Facility: CLINIC | Age: 85
End: 2020-01-01

## 2020-01-01 ENCOUNTER — INPATIENT (INPATIENT)
Facility: HOSPITAL | Age: 85
LOS: 6 days | Discharge: HOME | DRG: 280 | End: 2020-12-18
Attending: INTERNAL MEDICINE | Admitting: INTERNAL MEDICINE
Payer: MEDICARE

## 2020-01-01 ENCOUNTER — NON-APPOINTMENT (OUTPATIENT)
Age: 85
End: 2020-01-01

## 2020-01-01 ENCOUNTER — APPOINTMENT (OUTPATIENT)
Dept: NEUROLOGY | Facility: CLINIC | Age: 85
End: 2020-01-01
Payer: MEDICARE

## 2020-01-01 ENCOUNTER — APPOINTMENT (OUTPATIENT)
Dept: NEUROLOGY | Facility: CLINIC | Age: 85
End: 2020-01-01

## 2020-01-01 ENCOUNTER — RESULT REVIEW (OUTPATIENT)
Age: 85
End: 2020-01-01

## 2020-01-01 ENCOUNTER — APPOINTMENT (OUTPATIENT)
Dept: INTERNAL MEDICINE | Facility: CLINIC | Age: 85
End: 2020-01-01
Payer: MEDICARE

## 2020-01-01 ENCOUNTER — TRANSCRIPTION ENCOUNTER (OUTPATIENT)
Age: 85
End: 2020-01-01

## 2020-01-01 VITALS
HEART RATE: 93 BPM | DIASTOLIC BLOOD PRESSURE: 81 MMHG | SYSTOLIC BLOOD PRESSURE: 163 MMHG | TEMPERATURE: 95 F | WEIGHT: 108.03 LBS | OXYGEN SATURATION: 96 % | RESPIRATION RATE: 24 BRPM | HEIGHT: 58 IN

## 2020-01-01 VITALS
OXYGEN SATURATION: 96 % | SYSTOLIC BLOOD PRESSURE: 148 MMHG | RESPIRATION RATE: 16 BRPM | DIASTOLIC BLOOD PRESSURE: 78 MMHG | TEMPERATURE: 97.2 F | HEART RATE: 88 BPM

## 2020-01-01 VITALS
RESPIRATION RATE: 17 BRPM | SYSTOLIC BLOOD PRESSURE: 138 MMHG | DIASTOLIC BLOOD PRESSURE: 77 MMHG | WEIGHT: 110 LBS | HEART RATE: 87 BPM | BODY MASS INDEX: 22.18 KG/M2 | OXYGEN SATURATION: 97 % | TEMPERATURE: 97.7 F | HEIGHT: 59 IN

## 2020-01-01 VITALS — WEIGHT: 120 LBS | TEMPERATURE: 98.1 F | HEIGHT: 59 IN | BODY MASS INDEX: 24.19 KG/M2 | OXYGEN SATURATION: 97 %

## 2020-01-01 VITALS
HEIGHT: 59 IN | DIASTOLIC BLOOD PRESSURE: 65 MMHG | TEMPERATURE: 93.6 F | BODY MASS INDEX: 24.19 KG/M2 | WEIGHT: 120 LBS | SYSTOLIC BLOOD PRESSURE: 119 MMHG | HEART RATE: 72 BPM | OXYGEN SATURATION: 100 %

## 2020-01-01 VITALS
SYSTOLIC BLOOD PRESSURE: 152 MMHG | DIASTOLIC BLOOD PRESSURE: 65 MMHG | RESPIRATION RATE: 18 BRPM | TEMPERATURE: 98 F | OXYGEN SATURATION: 97 % | HEART RATE: 72 BPM

## 2020-01-01 VITALS — DIASTOLIC BLOOD PRESSURE: 82 MMHG | SYSTOLIC BLOOD PRESSURE: 135 MMHG

## 2020-01-01 VITALS — HEART RATE: 73 BPM | SYSTOLIC BLOOD PRESSURE: 110 MMHG | DIASTOLIC BLOOD PRESSURE: 56 MMHG

## 2020-01-01 DIAGNOSIS — F32.9 MAJOR DEPRESSIVE DISORDER, SINGLE EPISODE, UNSPECIFIED: ICD-10-CM

## 2020-01-01 DIAGNOSIS — I25.10 ATHEROSCLEROTIC HEART DISEASE OF NATIVE CORONARY ARTERY W/OUT ANGINA PECTORIS: ICD-10-CM

## 2020-01-01 DIAGNOSIS — I10 ESSENTIAL (PRIMARY) HYPERTENSION: ICD-10-CM

## 2020-01-01 DIAGNOSIS — Z71.89 OTHER SPECIFIED COUNSELING: ICD-10-CM

## 2020-01-01 DIAGNOSIS — Z78.9 OTHER SPECIFIED HEALTH STATUS: ICD-10-CM

## 2020-01-01 DIAGNOSIS — D64.9 ANEMIA, UNSPECIFIED: ICD-10-CM

## 2020-01-01 DIAGNOSIS — K59.09 OTHER CONSTIPATION: ICD-10-CM

## 2020-01-01 DIAGNOSIS — I50.33 ACUTE ON CHRONIC DIASTOLIC (CONGESTIVE) HEART FAILURE: ICD-10-CM

## 2020-01-01 DIAGNOSIS — I65.29 OCCLUSION AND STENOSIS OF UNSPECIFIED CAROTID ARTERY: ICD-10-CM

## 2020-01-01 DIAGNOSIS — Z86.59 PERSONAL HISTORY OF OTHER MENTAL AND BEHAVIORAL DISORDERS: ICD-10-CM

## 2020-01-01 DIAGNOSIS — Z00.00 ENCOUNTER FOR GENERAL ADULT MEDICAL EXAMINATION W/OUT ABNORMAL FINDINGS: ICD-10-CM

## 2020-01-01 DIAGNOSIS — Z29.9 ENCOUNTER FOR PROPHYLACTIC MEASURES, UNSPECIFIED: ICD-10-CM

## 2020-01-01 DIAGNOSIS — R41.3 OTHER AMNESIA: ICD-10-CM

## 2020-01-01 DIAGNOSIS — G62.9 POLYNEUROPATHY, UNSPECIFIED: ICD-10-CM

## 2020-01-01 DIAGNOSIS — Z02.9 ENCOUNTER FOR ADMINISTRATIVE EXAMINATIONS, UNSPECIFIED: ICD-10-CM

## 2020-01-01 DIAGNOSIS — E83.42 HYPOMAGNESEMIA: ICD-10-CM

## 2020-01-01 DIAGNOSIS — I11.0 HYPERTENSIVE HEART DISEASE WITH HEART FAILURE: ICD-10-CM

## 2020-01-01 DIAGNOSIS — R63.4 ABNORMAL WEIGHT LOSS: ICD-10-CM

## 2020-01-01 DIAGNOSIS — N95.2 POSTMENOPAUSAL ATROPHIC VAGINITIS: ICD-10-CM

## 2020-01-01 DIAGNOSIS — F03.91 UNSPECIFIED DEMENTIA WITH BEHAVIORAL DISTURBANCE: ICD-10-CM

## 2020-01-01 DIAGNOSIS — M81.0 AGE-RELATED OSTEOPOROSIS W/OUT CURRENT PATHOLOGICAL FRACTURE: ICD-10-CM

## 2020-01-01 DIAGNOSIS — Z86.39 PERSONAL HISTORY OF OTHER ENDOCRINE, NUTRITIONAL AND METABOLIC DISEASE: ICD-10-CM

## 2020-01-01 DIAGNOSIS — R32 UNSPECIFIED URINARY INCONTINENCE: ICD-10-CM

## 2020-01-01 DIAGNOSIS — F03.90 UNSPECIFIED DEMENTIA WITHOUT BEHAVIORAL DISTURBANCE: ICD-10-CM

## 2020-01-01 DIAGNOSIS — R80.9 PROTEINURIA, UNSPECIFIED: ICD-10-CM

## 2020-01-01 DIAGNOSIS — E43 UNSPECIFIED SEVERE PROTEIN-CALORIE MALNUTRITION: ICD-10-CM

## 2020-01-01 DIAGNOSIS — E11.9 TYPE 2 DIABETES MELLITUS WITHOUT COMPLICATIONS: ICD-10-CM

## 2020-01-01 DIAGNOSIS — D50.9 IRON DEFICIENCY ANEMIA, UNSPECIFIED: ICD-10-CM

## 2020-01-01 DIAGNOSIS — R26.81 UNSTEADINESS ON FEET: ICD-10-CM

## 2020-01-01 DIAGNOSIS — I50.9 HEART FAILURE, UNSPECIFIED: ICD-10-CM

## 2020-01-01 DIAGNOSIS — G47.00 INSOMNIA, UNSPECIFIED: ICD-10-CM

## 2020-01-01 DIAGNOSIS — D32.9 BENIGN NEOPLASM OF MENINGES, UNSPECIFIED: ICD-10-CM

## 2020-01-01 DIAGNOSIS — Z86.79 PERSONAL HISTORY OF OTHER DISEASES OF THE CIRCULATORY SYSTEM: ICD-10-CM

## 2020-01-01 LAB
% ALBUMIN: 39.4 % — SIGNIFICANT CHANGE UP
% ALPHA 1: 9.9 % — SIGNIFICANT CHANGE UP
% ALPHA 2: 18.3 % — SIGNIFICANT CHANGE UP
% BETA: 10.8 % — SIGNIFICANT CHANGE UP
% GAMMA: 21.6 % — SIGNIFICANT CHANGE UP
25(OH)D3 SERPL-MCNC: 25.4 NG/ML
A1C WITH ESTIMATED AVERAGE GLUCOSE RESULT: 6.8 % — HIGH (ref 4–5.6)
ALBUMIN SERPL ELPH-MCNC: 1.8 G/DL — LOW (ref 3.5–5)
ALBUMIN SERPL ELPH-MCNC: 1.9 G/DL — LOW (ref 3.5–5)
ALBUMIN SERPL ELPH-MCNC: 2.1 G/DL — LOW (ref 3.6–5.5)
ALBUMIN SERPL ELPH-MCNC: 3.3 G/DL
ALBUMIN/GLOB SERPL ELPH: 0.6 RATIO — SIGNIFICANT CHANGE UP
ALP BLD-CCNC: 83 U/L
ALP SERPL-CCNC: 301 U/L — HIGH (ref 40–120)
ALP SERPL-CCNC: 363 U/L — HIGH (ref 40–120)
ALPHA1 GLOB SERPL ELPH-MCNC: 0.5 G/DL — HIGH (ref 0.1–0.4)
ALPHA2 GLOB SERPL ELPH-MCNC: 1 G/DL — SIGNIFICANT CHANGE UP (ref 0.5–1)
ALT FLD-CCNC: 23 U/L DA — SIGNIFICANT CHANGE UP (ref 10–60)
ALT FLD-CCNC: 31 U/L DA — SIGNIFICANT CHANGE UP (ref 10–60)
ALT SERPL-CCNC: 13 U/L
ANION GAP SERPL CALC-SCNC: 10 MMOL/L — SIGNIFICANT CHANGE UP (ref 5–17)
ANION GAP SERPL CALC-SCNC: 10 MMOL/L — SIGNIFICANT CHANGE UP (ref 5–17)
ANION GAP SERPL CALC-SCNC: 11 MMOL/L
ANION GAP SERPL CALC-SCNC: 11 MMOL/L — SIGNIFICANT CHANGE UP (ref 5–17)
ANION GAP SERPL CALC-SCNC: 11 MMOL/L — SIGNIFICANT CHANGE UP (ref 5–17)
ANION GAP SERPL CALC-SCNC: 13 MMOL/L
ANION GAP SERPL CALC-SCNC: 8 MMOL/L — SIGNIFICANT CHANGE UP (ref 5–17)
ANION GAP SERPL CALC-SCNC: 9 MMOL/L — SIGNIFICANT CHANGE UP (ref 5–17)
APPEARANCE UR: CLEAR — SIGNIFICANT CHANGE UP
APPEARANCE: CLEAR
AST SERPL-CCNC: 20 U/L
AST SERPL-CCNC: 32 U/L — SIGNIFICANT CHANGE UP (ref 10–40)
AST SERPL-CCNC: 37 U/L — SIGNIFICANT CHANGE UP (ref 10–40)
B-GLOBULIN SERPL ELPH-MCNC: 0.6 G/DL — SIGNIFICANT CHANGE UP (ref 0.5–1)
BACTERIA # UR AUTO: ABNORMAL /HPF
BASOPHILS # BLD AUTO: 0.05 K/UL
BASOPHILS # BLD AUTO: 0.05 K/UL
BASOPHILS # BLD AUTO: 0.05 K/UL — SIGNIFICANT CHANGE UP (ref 0–0.2)
BASOPHILS NFR BLD AUTO: 0.5 % — SIGNIFICANT CHANGE UP (ref 0–2)
BASOPHILS NFR BLD AUTO: 0.6 %
BASOPHILS NFR BLD AUTO: 0.7 %
BILIRUB SERPL-MCNC: 0.4 MG/DL
BILIRUB SERPL-MCNC: 0.4 MG/DL — SIGNIFICANT CHANGE UP (ref 0.2–1.2)
BILIRUB SERPL-MCNC: 0.6 MG/DL — SIGNIFICANT CHANGE UP (ref 0.2–1.2)
BILIRUB UR-MCNC: NEGATIVE — SIGNIFICANT CHANGE UP
BILIRUBIN URINE: NEGATIVE
BLD GP AB SCN SERPL QL: SIGNIFICANT CHANGE UP
BLD GP AB SCN SERPL QL: SIGNIFICANT CHANGE UP
BLOOD URINE: NEGATIVE
BUN SERPL-MCNC: 14 MG/DL
BUN SERPL-MCNC: 16 MG/DL — SIGNIFICANT CHANGE UP (ref 7–18)
BUN SERPL-MCNC: 16 MG/DL — SIGNIFICANT CHANGE UP (ref 7–18)
BUN SERPL-MCNC: 17 MG/DL — SIGNIFICANT CHANGE UP (ref 7–18)
BUN SERPL-MCNC: 18 MG/DL
BUN SERPL-MCNC: 18 MG/DL — SIGNIFICANT CHANGE UP (ref 7–18)
BUN SERPL-MCNC: 18 MG/DL — SIGNIFICANT CHANGE UP (ref 7–18)
BUN SERPL-MCNC: 21 MG/DL — HIGH (ref 7–18)
BUN SERPL-MCNC: 21 MG/DL — HIGH (ref 7–18)
BUN SERPL-MCNC: 23 MG/DL — HIGH (ref 7–18)
CALCIUM SERPL-MCNC: 8.2 MG/DL — LOW (ref 8.4–10.5)
CALCIUM SERPL-MCNC: 8.2 MG/DL — LOW (ref 8.4–10.5)
CALCIUM SERPL-MCNC: 8.3 MG/DL — LOW (ref 8.4–10.5)
CALCIUM SERPL-MCNC: 8.5 MG/DL — SIGNIFICANT CHANGE UP (ref 8.4–10.5)
CALCIUM SERPL-MCNC: 8.5 MG/DL — SIGNIFICANT CHANGE UP (ref 8.4–10.5)
CALCIUM SERPL-MCNC: 8.6 MG/DL — SIGNIFICANT CHANGE UP (ref 8.4–10.5)
CALCIUM SERPL-MCNC: 8.6 MG/DL — SIGNIFICANT CHANGE UP (ref 8.4–10.5)
CALCIUM SERPL-MCNC: 8.8 MG/DL — SIGNIFICANT CHANGE UP (ref 8.4–10.5)
CALCIUM SERPL-MCNC: 9.2 MG/DL
CALCIUM SERPL-MCNC: 9.9 MG/DL
CEA SERPL-MCNC: 2.2 NG/ML — SIGNIFICANT CHANGE UP (ref 0–3.8)
CHLORIDE SERPL-SCNC: 100 MMOL/L — SIGNIFICANT CHANGE UP (ref 96–108)
CHLORIDE SERPL-SCNC: 101 MMOL/L — SIGNIFICANT CHANGE UP (ref 96–108)
CHLORIDE SERPL-SCNC: 101 MMOL/L — SIGNIFICANT CHANGE UP (ref 96–108)
CHLORIDE SERPL-SCNC: 104 MMOL/L — SIGNIFICANT CHANGE UP (ref 96–108)
CHLORIDE SERPL-SCNC: 94 MMOL/L
CHLORIDE SERPL-SCNC: 98 MMOL/L — SIGNIFICANT CHANGE UP (ref 96–108)
CHLORIDE SERPL-SCNC: 99 MMOL/L
CHLORIDE SERPL-SCNC: 99 MMOL/L — SIGNIFICANT CHANGE UP (ref 96–108)
CHOLEST SERPL-MCNC: 159 MG/DL
CHOLEST/HDLC SERPL: 5.9 RATIO
CK MB BLD-MCNC: 15.6 % — HIGH (ref 0–3.5)
CK MB CFR SERPL CALC: 4.2 NG/ML — HIGH (ref 0–3.6)
CK SERPL-CCNC: 27 U/L — SIGNIFICANT CHANGE UP (ref 21–215)
CO2 SERPL-SCNC: 21 MMOL/L — LOW (ref 22–31)
CO2 SERPL-SCNC: 22 MMOL/L
CO2 SERPL-SCNC: 22 MMOL/L — SIGNIFICANT CHANGE UP (ref 22–31)
CO2 SERPL-SCNC: 23 MMOL/L — SIGNIFICANT CHANGE UP (ref 22–31)
CO2 SERPL-SCNC: 24 MMOL/L
CO2 SERPL-SCNC: 25 MMOL/L — SIGNIFICANT CHANGE UP (ref 22–31)
CO2 SERPL-SCNC: 25 MMOL/L — SIGNIFICANT CHANGE UP (ref 22–31)
CO2 SERPL-SCNC: 27 MMOL/L — SIGNIFICANT CHANGE UP (ref 22–31)
COLOR SPEC: YELLOW — SIGNIFICANT CHANGE UP
COLOR: NORMAL
CREAT SERPL-MCNC: 0.51 MG/DL — SIGNIFICANT CHANGE UP (ref 0.5–1.3)
CREAT SERPL-MCNC: 0.54 MG/DL — SIGNIFICANT CHANGE UP (ref 0.5–1.3)
CREAT SERPL-MCNC: 0.57 MG/DL — SIGNIFICANT CHANGE UP (ref 0.5–1.3)
CREAT SERPL-MCNC: 0.6 MG/DL — SIGNIFICANT CHANGE UP (ref 0.5–1.3)
CREAT SERPL-MCNC: 0.6 MG/DL — SIGNIFICANT CHANGE UP (ref 0.5–1.3)
CREAT SERPL-MCNC: 0.62 MG/DL — SIGNIFICANT CHANGE UP (ref 0.5–1.3)
CREAT SERPL-MCNC: 0.63 MG/DL — SIGNIFICANT CHANGE UP (ref 0.5–1.3)
CREAT SERPL-MCNC: 0.65 MG/DL — SIGNIFICANT CHANGE UP (ref 0.5–1.3)
CREAT SERPL-MCNC: 0.82 MG/DL
CREAT SERPL-MCNC: 0.91 MG/DL
CREAT SPEC-SCNC: 58 MG/DL
CULTURE RESULTS: SIGNIFICANT CHANGE UP
D DIMER BLD IA.RAPID-MCNC: 2660 NG/ML DDU — HIGH
DIFF PNL FLD: NEGATIVE — SIGNIFICANT CHANGE UP
EOSINOPHIL # BLD AUTO: 0.05 K/UL — SIGNIFICANT CHANGE UP (ref 0–0.5)
EOSINOPHIL # BLD AUTO: 0.16 K/UL
EOSINOPHIL # BLD AUTO: 0.23 K/UL
EOSINOPHIL NFR BLD AUTO: 0.5 % — SIGNIFICANT CHANGE UP (ref 0–6)
EOSINOPHIL NFR BLD AUTO: 2.1 %
EOSINOPHIL NFR BLD AUTO: 2.6 %
ESTIMATED AVERAGE GLUCOSE: 137 MG/DL
ESTIMATED AVERAGE GLUCOSE: 148 MG/DL — HIGH (ref 68–114)
FERRITIN SERPL-MCNC: 291 NG/ML
FERRITIN SERPL-MCNC: 508 NG/ML — HIGH (ref 15–150)
FERRITIN SERPL-MCNC: 517 NG/ML — HIGH (ref 15–150)
FOLATE SERPL-MCNC: 6 NG/ML — SIGNIFICANT CHANGE UP
FOLATE SERPL-MCNC: >20 NG/ML
GAMMA GLOBULIN: 1.2 G/DL — SIGNIFICANT CHANGE UP (ref 0.6–1.6)
GLUCOSE BLDC GLUCOMTR-MCNC: 107 MG/DL — HIGH (ref 70–99)
GLUCOSE BLDC GLUCOMTR-MCNC: 133 MG/DL — HIGH (ref 70–99)
GLUCOSE BLDC GLUCOMTR-MCNC: 137 MG/DL — HIGH (ref 70–99)
GLUCOSE BLDC GLUCOMTR-MCNC: 146 MG/DL — HIGH (ref 70–99)
GLUCOSE BLDC GLUCOMTR-MCNC: 155 MG/DL — HIGH (ref 70–99)
GLUCOSE BLDC GLUCOMTR-MCNC: 157 MG/DL — HIGH (ref 70–99)
GLUCOSE BLDC GLUCOMTR-MCNC: 165 MG/DL — HIGH (ref 70–99)
GLUCOSE BLDC GLUCOMTR-MCNC: 167 MG/DL — HIGH (ref 70–99)
GLUCOSE BLDC GLUCOMTR-MCNC: 170 MG/DL — HIGH (ref 70–99)
GLUCOSE BLDC GLUCOMTR-MCNC: 172 MG/DL — HIGH (ref 70–99)
GLUCOSE BLDC GLUCOMTR-MCNC: 174 MG/DL — HIGH (ref 70–99)
GLUCOSE BLDC GLUCOMTR-MCNC: 175 MG/DL — HIGH (ref 70–99)
GLUCOSE BLDC GLUCOMTR-MCNC: 179 MG/DL — HIGH (ref 70–99)
GLUCOSE BLDC GLUCOMTR-MCNC: 185 MG/DL — HIGH (ref 70–99)
GLUCOSE BLDC GLUCOMTR-MCNC: 187 MG/DL — HIGH (ref 70–99)
GLUCOSE BLDC GLUCOMTR-MCNC: 193 MG/DL — HIGH (ref 70–99)
GLUCOSE BLDC GLUCOMTR-MCNC: 193 MG/DL — HIGH (ref 70–99)
GLUCOSE BLDC GLUCOMTR-MCNC: 198 MG/DL — HIGH (ref 70–99)
GLUCOSE BLDC GLUCOMTR-MCNC: 200 MG/DL — HIGH (ref 70–99)
GLUCOSE BLDC GLUCOMTR-MCNC: 208 MG/DL — HIGH (ref 70–99)
GLUCOSE BLDC GLUCOMTR-MCNC: 211 MG/DL — HIGH (ref 70–99)
GLUCOSE BLDC GLUCOMTR-MCNC: 212 MG/DL — HIGH (ref 70–99)
GLUCOSE BLDC GLUCOMTR-MCNC: 212 MG/DL — HIGH (ref 70–99)
GLUCOSE BLDC GLUCOMTR-MCNC: 230 MG/DL — HIGH (ref 70–99)
GLUCOSE BLDC GLUCOMTR-MCNC: 233 MG/DL — HIGH (ref 70–99)
GLUCOSE BLDC GLUCOMTR-MCNC: 243 MG/DL — HIGH (ref 70–99)
GLUCOSE BLDC GLUCOMTR-MCNC: 250 MG/DL — HIGH (ref 70–99)
GLUCOSE BLDC GLUCOMTR-MCNC: 255 MG/DL — HIGH (ref 70–99)
GLUCOSE BLDC GLUCOMTR-MCNC: 255 MG/DL — HIGH (ref 70–99)
GLUCOSE BLDC GLUCOMTR-MCNC: 268 MG/DL — HIGH (ref 70–99)
GLUCOSE BLDC GLUCOMTR-MCNC: 274 MG/DL — HIGH (ref 70–99)
GLUCOSE BLDC GLUCOMTR-MCNC: 286 MG/DL — HIGH (ref 70–99)
GLUCOSE QUALITATIVE U: NEGATIVE
GLUCOSE SERPL-MCNC: 145 MG/DL — HIGH (ref 70–99)
GLUCOSE SERPL-MCNC: 149 MG/DL — HIGH (ref 70–99)
GLUCOSE SERPL-MCNC: 150 MG/DL — HIGH (ref 70–99)
GLUCOSE SERPL-MCNC: 153 MG/DL — HIGH (ref 70–99)
GLUCOSE SERPL-MCNC: 159 MG/DL — HIGH (ref 70–99)
GLUCOSE SERPL-MCNC: 164 MG/DL — HIGH (ref 70–99)
GLUCOSE SERPL-MCNC: 191 MG/DL — HIGH (ref 70–99)
GLUCOSE SERPL-MCNC: 238 MG/DL
GLUCOSE SERPL-MCNC: 240 MG/DL — HIGH (ref 70–99)
GLUCOSE SERPL-MCNC: 89 MG/DL
GLUCOSE UR QL: NEGATIVE — SIGNIFICANT CHANGE UP
HBA1C MFR BLD HPLC: 6.4 %
HBV SURFACE AB SER QL: NONREACTIVE
HBV SURFACE AG SER QL: NONREACTIVE
HCT VFR BLD CALC: 24.3 % — LOW (ref 34.5–45)
HCT VFR BLD CALC: 24.6 % — LOW (ref 34.5–45)
HCT VFR BLD CALC: 25.8 % — LOW (ref 34.5–45)
HCT VFR BLD CALC: 26.2 % — LOW (ref 34.5–45)
HCT VFR BLD CALC: 26.8 % — LOW (ref 34.5–45)
HCT VFR BLD CALC: 27.2 % — LOW (ref 34.5–45)
HCT VFR BLD CALC: 28.4 % — LOW (ref 34.5–45)
HCT VFR BLD CALC: 28.8 % — LOW (ref 34.5–45)
HCT VFR BLD CALC: 30 % — LOW (ref 34.5–45)
HCT VFR BLD CALC: 30.3 %
HCT VFR BLD CALC: 31.9 %
HCV AB SER QL: NONREACTIVE
HCV S/CO RATIO: 0.24 S/CO
HDLC SERPL-MCNC: 27 MG/DL
HEMOCCULT STL QL IA: NEGATIVE
HGB BLD-MCNC: 7.1 G/DL — LOW (ref 11.5–15.5)
HGB BLD-MCNC: 7.2 G/DL — LOW (ref 11.5–15.5)
HGB BLD-MCNC: 7.4 G/DL — LOW (ref 11.5–15.5)
HGB BLD-MCNC: 7.7 G/DL — LOW (ref 11.5–15.5)
HGB BLD-MCNC: 7.7 G/DL — LOW (ref 11.5–15.5)
HGB BLD-MCNC: 7.8 G/DL — LOW (ref 11.5–15.5)
HGB BLD-MCNC: 8.8 G/DL
HGB BLD-MCNC: 8.8 G/DL — LOW (ref 11.5–15.5)
HGB BLD-MCNC: 8.8 G/DL — LOW (ref 11.5–15.5)
HGB BLD-MCNC: 9.1 G/DL — LOW (ref 11.5–15.5)
HGB BLD-MCNC: 9.3 G/DL
HIV1+2 AB SPEC QL IA.RAPID: NONREACTIVE
IMM GRANULOCYTES NFR BLD AUTO: 0.3 %
IMM GRANULOCYTES NFR BLD AUTO: 0.3 %
IMM GRANULOCYTES NFR BLD AUTO: 0.4 % — SIGNIFICANT CHANGE UP (ref 0–1.5)
INTERPRETATION SERPL IFE-IMP: SIGNIFICANT CHANGE UP
IRON SATN MFR SERPL: 14 % — LOW (ref 15–50)
IRON SATN MFR SERPL: 18 UG/DL — LOW (ref 40–150)
IRON SATN MFR SERPL: 7 %
IRON SERPL-MCNC: 16 UG/DL
KETONES UR-MCNC: NEGATIVE — SIGNIFICANT CHANGE UP
KETONES URINE: NEGATIVE
LDLC SERPL CALC-MCNC: 94 MG/DL
LEUKOCYTE ESTERASE UR-ACNC: NEGATIVE — SIGNIFICANT CHANGE UP
LEUKOCYTE ESTERASE URINE: NEGATIVE
LYMPHOCYTES # BLD AUTO: 0.6 K/UL
LYMPHOCYTES # BLD AUTO: 0.89 K/UL — LOW (ref 1–3.3)
LYMPHOCYTES # BLD AUTO: 0.96 K/UL
LYMPHOCYTES # BLD AUTO: 9.6 % — LOW (ref 13–44)
LYMPHOCYTES NFR BLD AUTO: 10.9 %
LYMPHOCYTES NFR BLD AUTO: 7.9 %
MAGNESIUM SERPL-MCNC: 1.3 MG/DL — LOW (ref 1.6–2.6)
MAGNESIUM SERPL-MCNC: 1.4 MG/DL — LOW (ref 1.6–2.6)
MAGNESIUM SERPL-MCNC: 1.4 MG/DL — LOW (ref 1.6–2.6)
MAGNESIUM SERPL-MCNC: 1.6 MG/DL — SIGNIFICANT CHANGE UP (ref 1.6–2.6)
MAGNESIUM SERPL-MCNC: 1.7 MG/DL — SIGNIFICANT CHANGE UP (ref 1.6–2.6)
MAN DIFF?: NORMAL
MAN DIFF?: NORMAL
MCHC RBC-ENTMCNC: 21.6 PG — LOW (ref 27–34)
MCHC RBC-ENTMCNC: 21.8 PG — LOW (ref 27–34)
MCHC RBC-ENTMCNC: 21.8 PG — LOW (ref 27–34)
MCHC RBC-ENTMCNC: 21.9 PG — LOW (ref 27–34)
MCHC RBC-ENTMCNC: 22 PG — LOW (ref 27–34)
MCHC RBC-ENTMCNC: 22.1 PG — LOW (ref 27–34)
MCHC RBC-ENTMCNC: 22.6 PG
MCHC RBC-ENTMCNC: 22.7 PG
MCHC RBC-ENTMCNC: 23.8 PG — LOW (ref 27–34)
MCHC RBC-ENTMCNC: 28.7 GM/DL — LOW (ref 32–36)
MCHC RBC-ENTMCNC: 29 GM/DL
MCHC RBC-ENTMCNC: 29.2 GM/DL
MCHC RBC-ENTMCNC: 29.2 GM/DL — LOW (ref 32–36)
MCHC RBC-ENTMCNC: 29.3 GM/DL — LOW (ref 32–36)
MCHC RBC-ENTMCNC: 29.4 GM/DL — LOW (ref 32–36)
MCHC RBC-ENTMCNC: 30.3 GM/DL — LOW (ref 32–36)
MCHC RBC-ENTMCNC: 30.6 GM/DL — LOW (ref 32–36)
MCHC RBC-ENTMCNC: 31 GM/DL — LOW (ref 32–36)
MCV RBC AUTO: 74.5 FL — LOW (ref 80–100)
MCV RBC AUTO: 75.3 FL — LOW (ref 80–100)
MCV RBC AUTO: 75.4 FL — LOW (ref 80–100)
MCV RBC AUTO: 75.5 FL — LOW (ref 80–100)
MCV RBC AUTO: 75.7 FL — LOW (ref 80–100)
MCV RBC AUTO: 76.4 FL — LOW (ref 80–100)
MCV RBC AUTO: 77 FL — LOW (ref 80–100)
MCV RBC AUTO: 77.7 FL
MCV RBC AUTO: 78 FL
MCV RBC AUTO: 78 FL — LOW (ref 80–100)
MCV RBC AUTO: 78.3 FL — LOW (ref 80–100)
METHYLMALONATE SERPL-SCNC: 167 NMOL/L
MICROALBUMIN 24H UR DL<=1MG/L-MCNC: 2.1 MG/DL
MICROALBUMIN/CREAT 24H UR-RTO: 37 MG/G
MONOCYTES # BLD AUTO: 0.36 K/UL
MONOCYTES # BLD AUTO: 0.41 K/UL
MONOCYTES # BLD AUTO: 0.44 K/UL — SIGNIFICANT CHANGE UP (ref 0–0.9)
MONOCYTES NFR BLD AUTO: 4.7 %
MONOCYTES NFR BLD AUTO: 4.7 %
MONOCYTES NFR BLD AUTO: 4.8 % — SIGNIFICANT CHANGE UP (ref 2–14)
NEUTROPHILS # BLD AUTO: 6.42 K/UL
NEUTROPHILS # BLD AUTO: 7.09 K/UL
NEUTROPHILS # BLD AUTO: 7.78 K/UL — HIGH (ref 1.8–7.4)
NEUTROPHILS NFR BLD AUTO: 80.9 %
NEUTROPHILS NFR BLD AUTO: 84.2 % — HIGH (ref 43–77)
NEUTROPHILS NFR BLD AUTO: 84.3 %
NITRITE UR-MCNC: NEGATIVE — SIGNIFICANT CHANGE UP
NITRITE URINE: NEGATIVE
NRBC # BLD: 0 /100 WBCS — SIGNIFICANT CHANGE UP (ref 0–0)
NT-PROBNP SERPL-SCNC: HIGH PG/ML (ref 0–450)
OSMOLALITY SERPL: 284 MOSMOL/KG
OSMOLALITY UR: 465 MOSM/KG
PH UR: 7 — SIGNIFICANT CHANGE UP (ref 5–8)
PH URINE: 7.5
PHOSPHATE SERPL-MCNC: 3.7 MG/DL — SIGNIFICANT CHANGE UP (ref 2.5–4.5)
PLATELET # BLD AUTO: 187 K/UL — SIGNIFICANT CHANGE UP (ref 150–400)
PLATELET # BLD AUTO: 215 K/UL — SIGNIFICANT CHANGE UP (ref 150–400)
PLATELET # BLD AUTO: 222 K/UL — SIGNIFICANT CHANGE UP (ref 150–400)
PLATELET # BLD AUTO: 225 K/UL — SIGNIFICANT CHANGE UP (ref 150–400)
PLATELET # BLD AUTO: 229 K/UL — SIGNIFICANT CHANGE UP (ref 150–400)
PLATELET # BLD AUTO: 232 K/UL — SIGNIFICANT CHANGE UP (ref 150–400)
PLATELET # BLD AUTO: 235 K/UL — SIGNIFICANT CHANGE UP (ref 150–400)
PLATELET # BLD AUTO: 244 K/UL — SIGNIFICANT CHANGE UP (ref 150–400)
PLATELET # BLD AUTO: 253 K/UL — SIGNIFICANT CHANGE UP (ref 150–400)
PLATELET # BLD AUTO: 409 K/UL
PLATELET # BLD AUTO: 475 K/UL
POTASSIUM SERPL-MCNC: 3.5 MMOL/L — SIGNIFICANT CHANGE UP (ref 3.5–5.3)
POTASSIUM SERPL-MCNC: 3.5 MMOL/L — SIGNIFICANT CHANGE UP (ref 3.5–5.3)
POTASSIUM SERPL-MCNC: 3.7 MMOL/L — SIGNIFICANT CHANGE UP (ref 3.5–5.3)
POTASSIUM SERPL-MCNC: 3.8 MMOL/L — SIGNIFICANT CHANGE UP (ref 3.5–5.3)
POTASSIUM SERPL-MCNC: 3.8 MMOL/L — SIGNIFICANT CHANGE UP (ref 3.5–5.3)
POTASSIUM SERPL-MCNC: 4 MMOL/L — SIGNIFICANT CHANGE UP (ref 3.5–5.3)
POTASSIUM SERPL-MCNC: 4.4 MMOL/L — SIGNIFICANT CHANGE UP (ref 3.5–5.3)
POTASSIUM SERPL-MCNC: 4.5 MMOL/L — SIGNIFICANT CHANGE UP (ref 3.5–5.3)
POTASSIUM SERPL-SCNC: 3.5 MMOL/L — SIGNIFICANT CHANGE UP (ref 3.5–5.3)
POTASSIUM SERPL-SCNC: 3.5 MMOL/L — SIGNIFICANT CHANGE UP (ref 3.5–5.3)
POTASSIUM SERPL-SCNC: 3.7 MMOL/L — SIGNIFICANT CHANGE UP (ref 3.5–5.3)
POTASSIUM SERPL-SCNC: 3.8 MMOL/L — SIGNIFICANT CHANGE UP (ref 3.5–5.3)
POTASSIUM SERPL-SCNC: 3.8 MMOL/L — SIGNIFICANT CHANGE UP (ref 3.5–5.3)
POTASSIUM SERPL-SCNC: 4 MMOL/L — SIGNIFICANT CHANGE UP (ref 3.5–5.3)
POTASSIUM SERPL-SCNC: 4.4 MMOL/L — SIGNIFICANT CHANGE UP (ref 3.5–5.3)
POTASSIUM SERPL-SCNC: 4.5 MMOL/L — SIGNIFICANT CHANGE UP (ref 3.5–5.3)
POTASSIUM SERPL-SCNC: 4.6 MMOL/L
POTASSIUM SERPL-SCNC: 4.8 MMOL/L
PROT PATTERN SERPL ELPH-IMP: SIGNIFICANT CHANGE UP
PROT SERPL-MCNC: 5.4 G/DL — LOW (ref 6–8.3)
PROT SERPL-MCNC: 5.4 G/DL — LOW (ref 6–8.3)
PROT SERPL-MCNC: 6 G/DL — SIGNIFICANT CHANGE UP (ref 6–8.3)
PROT SERPL-MCNC: 6.1 G/DL — SIGNIFICANT CHANGE UP (ref 6–8.3)
PROT SERPL-MCNC: 6.8 G/DL
PROT UR-MCNC: 15
PROTEIN URINE: NORMAL
RBC # BLD: 3.22 M/UL — LOW (ref 3.8–5.2)
RBC # BLD: 3.3 M/UL — LOW (ref 3.8–5.2)
RBC # BLD: 3.42 M/UL — LOW (ref 3.8–5.2)
RBC # BLD: 3.48 M/UL — LOW (ref 3.8–5.2)
RBC # BLD: 3.54 M/UL — LOW (ref 3.8–5.2)
RBC # BLD: 3.56 M/UL — LOW (ref 3.8–5.2)
RBC # BLD: 3.69 M/UL — LOW (ref 3.8–5.2)
RBC # BLD: 3.69 M/UL — LOW (ref 3.8–5.2)
RBC # BLD: 3.83 M/UL — SIGNIFICANT CHANGE UP (ref 3.8–5.2)
RBC # BLD: 3.9 M/UL
RBC # BLD: 4.09 M/UL
RBC # FLD: 15.7 %
RBC # FLD: 16 %
RBC # FLD: 17 % — HIGH (ref 10.3–14.5)
RBC # FLD: 17.1 % — HIGH (ref 10.3–14.5)
RBC # FLD: 17.2 % — HIGH (ref 10.3–14.5)
RBC # FLD: 17.3 % — HIGH (ref 10.3–14.5)
RBC # FLD: 17.7 % — HIGH (ref 10.3–14.5)
RBC # FLD: 18 % — HIGH (ref 10.3–14.5)
RBC # FLD: 18.2 % — HIGH (ref 10.3–14.5)
RBC # FLD: 18.3 % — HIGH (ref 10.3–14.5)
RBC # FLD: 19.1 % — HIGH (ref 10.3–14.5)
RBC CASTS # UR COMP ASSIST: SIGNIFICANT CHANGE UP /HPF (ref 0–2)
SARS-COV-2 IGG SERPL QL IA: NEGATIVE — SIGNIFICANT CHANGE UP
SARS-COV-2 IGM SERPL IA-ACNC: 0.07 INDEX — SIGNIFICANT CHANGE UP
SARS-COV-2 RNA SPEC QL NAA+PROBE: SIGNIFICANT CHANGE UP
SODIUM ?TM SUB UR QN: 77 MMOL/L
SODIUM SERPL-SCNC: 131 MMOL/L
SODIUM SERPL-SCNC: 131 MMOL/L — LOW (ref 135–145)
SODIUM SERPL-SCNC: 132 MMOL/L
SODIUM SERPL-SCNC: 132 MMOL/L — LOW (ref 135–145)
SODIUM SERPL-SCNC: 133 MMOL/L — LOW (ref 135–145)
SODIUM SERPL-SCNC: 133 MMOL/L — LOW (ref 135–145)
SODIUM SERPL-SCNC: 134 MMOL/L — LOW (ref 135–145)
SODIUM SERPL-SCNC: 134 MMOL/L — LOW (ref 135–145)
SODIUM SERPL-SCNC: 135 MMOL/L — SIGNIFICANT CHANGE UP (ref 135–145)
SODIUM SERPL-SCNC: 135 MMOL/L — SIGNIFICANT CHANGE UP (ref 135–145)
SP GR SPEC: 1.01 — SIGNIFICANT CHANGE UP (ref 1.01–1.02)
SPECIFIC GRAVITY URINE: 1.01
SPECIMEN SOURCE: SIGNIFICANT CHANGE UP
SURGICAL PATHOLOGY STUDY: SIGNIFICANT CHANGE UP
T PALLIDUM AB SER QL IA: NEGATIVE
T3 SERPL-MCNC: 72 NG/DL
T4 SERPL-MCNC: 8.5 UG/DL
TIBC SERPL-MCNC: 132 UG/DL — LOW (ref 250–450)
TIBC SERPL-MCNC: 229 UG/DL
TRANSFERRIN SERPL-MCNC: 175 MG/DL
TRIGL SERPL-MCNC: 188 MG/DL
TROPONIN I SERPL-MCNC: 0.73 NG/ML — HIGH (ref 0–0.04)
TROPONIN I SERPL-MCNC: 1.12 NG/ML — HIGH (ref 0–0.04)
TROPONIN I SERPL-MCNC: 1.89 NG/ML — HIGH (ref 0–0.04)
TROPONIN I SERPL-MCNC: 2.43 NG/ML — HIGH (ref 0–0.04)
TSH SERPL-ACNC: 3.53 UIU/ML
TSH SERPL-ACNC: 3.55 UIU/ML
TSH SERPL-MCNC: 3.71 UU/ML — SIGNIFICANT CHANGE UP (ref 0.34–4.82)
UIBC SERPL-MCNC: 114 UG/DL — SIGNIFICANT CHANGE UP (ref 110–370)
UIBC SERPL-MCNC: 214 UG/DL
UROBILINOGEN FLD QL: NEGATIVE — SIGNIFICANT CHANGE UP
UROBILINOGEN URINE: NORMAL
VIT B12 SERPL-MCNC: >2000 PG/ML
VIT B12 SERPL-MCNC: >2000 PG/ML — HIGH (ref 232–1245)
WBC # BLD: 6.73 K/UL — SIGNIFICANT CHANGE UP (ref 3.8–10.5)
WBC # BLD: 7.34 K/UL — SIGNIFICANT CHANGE UP (ref 3.8–10.5)
WBC # BLD: 7.34 K/UL — SIGNIFICANT CHANGE UP (ref 3.8–10.5)
WBC # BLD: 7.86 K/UL — SIGNIFICANT CHANGE UP (ref 3.8–10.5)
WBC # BLD: 7.96 K/UL — SIGNIFICANT CHANGE UP (ref 3.8–10.5)
WBC # BLD: 8.05 K/UL — SIGNIFICANT CHANGE UP (ref 3.8–10.5)
WBC # BLD: 8.58 K/UL — SIGNIFICANT CHANGE UP (ref 3.8–10.5)
WBC # BLD: 8.79 K/UL — SIGNIFICANT CHANGE UP (ref 3.8–10.5)
WBC # BLD: 9.25 K/UL — SIGNIFICANT CHANGE UP (ref 3.8–10.5)
WBC # FLD AUTO: 6.73 K/UL — SIGNIFICANT CHANGE UP (ref 3.8–10.5)
WBC # FLD AUTO: 7.34 K/UL — SIGNIFICANT CHANGE UP (ref 3.8–10.5)
WBC # FLD AUTO: 7.34 K/UL — SIGNIFICANT CHANGE UP (ref 3.8–10.5)
WBC # FLD AUTO: 7.61 K/UL
WBC # FLD AUTO: 7.86 K/UL — SIGNIFICANT CHANGE UP (ref 3.8–10.5)
WBC # FLD AUTO: 7.96 K/UL — SIGNIFICANT CHANGE UP (ref 3.8–10.5)
WBC # FLD AUTO: 8.05 K/UL — SIGNIFICANT CHANGE UP (ref 3.8–10.5)
WBC # FLD AUTO: 8.58 K/UL — SIGNIFICANT CHANGE UP (ref 3.8–10.5)
WBC # FLD AUTO: 8.77 K/UL
WBC # FLD AUTO: 8.79 K/UL — SIGNIFICANT CHANGE UP (ref 3.8–10.5)
WBC # FLD AUTO: 9.25 K/UL — SIGNIFICANT CHANGE UP (ref 3.8–10.5)
WBC UR QL: SIGNIFICANT CHANGE UP /HPF (ref 0–5)

## 2020-01-01 PROCEDURE — 93000 ELECTROCARDIOGRAM COMPLETE: CPT

## 2020-01-01 PROCEDURE — 87635 SARS-COV-2 COVID-19 AMP PRB: CPT

## 2020-01-01 PROCEDURE — 70450 CT HEAD/BRAIN W/O DYE: CPT

## 2020-01-01 PROCEDURE — 86901 BLOOD TYPING SEROLOGIC RH(D): CPT

## 2020-01-01 PROCEDURE — 93005 ELECTROCARDIOGRAM TRACING: CPT

## 2020-01-01 PROCEDURE — 87086 URINE CULTURE/COLONY COUNT: CPT

## 2020-01-01 PROCEDURE — 99053 MED SERV 10PM-8AM 24 HR FAC: CPT

## 2020-01-01 PROCEDURE — 76705 ECHO EXAM OF ABDOMEN: CPT

## 2020-01-01 PROCEDURE — 86334 IMMUNOFIX E-PHORESIS SERUM: CPT

## 2020-01-01 PROCEDURE — 80048 BASIC METABOLIC PNL TOTAL CA: CPT

## 2020-01-01 PROCEDURE — 84443 ASSAY THYROID STIM HORMONE: CPT

## 2020-01-01 PROCEDURE — 99214 OFFICE O/P EST MOD 30 MIN: CPT

## 2020-01-01 PROCEDURE — 85025 COMPLETE CBC W/AUTO DIFF WBC: CPT

## 2020-01-01 PROCEDURE — P9040: CPT

## 2020-01-01 PROCEDURE — 74177 CT ABD & PELVIS W/CONTRAST: CPT | Mod: 26

## 2020-01-01 PROCEDURE — 83550 IRON BINDING TEST: CPT

## 2020-01-01 PROCEDURE — 93306 TTE W/DOPPLER COMPLETE: CPT

## 2020-01-01 PROCEDURE — 82553 CREATINE MB FRACTION: CPT

## 2020-01-01 PROCEDURE — 83540 ASSAY OF IRON: CPT

## 2020-01-01 PROCEDURE — 99204 OFFICE O/P NEW MOD 45 MIN: CPT | Mod: 25

## 2020-01-01 PROCEDURE — 76705 ECHO EXAM OF ABDOMEN: CPT | Mod: 26

## 2020-01-01 PROCEDURE — 83880 ASSAY OF NATRIURETIC PEPTIDE: CPT

## 2020-01-01 PROCEDURE — 99285 EMERGENCY DEPT VISIT HI MDM: CPT

## 2020-01-01 PROCEDURE — 71045 X-RAY EXAM CHEST 1 VIEW: CPT | Mod: 26

## 2020-01-01 PROCEDURE — 81001 URINALYSIS AUTO W/SCOPE: CPT

## 2020-01-01 PROCEDURE — 86769 SARS-COV-2 COVID-19 ANTIBODY: CPT

## 2020-01-01 PROCEDURE — 84100 ASSAY OF PHOSPHORUS: CPT

## 2020-01-01 PROCEDURE — 86900 BLOOD TYPING SEROLOGIC ABO: CPT

## 2020-01-01 PROCEDURE — 71275 CT ANGIOGRAPHY CHEST: CPT | Mod: 26

## 2020-01-01 PROCEDURE — 99213 OFFICE O/P EST LOW 20 MIN: CPT

## 2020-01-01 PROCEDURE — 85027 COMPLETE CBC AUTOMATED: CPT

## 2020-01-01 PROCEDURE — 88305 TISSUE EXAM BY PATHOLOGIST: CPT | Mod: 26

## 2020-01-01 PROCEDURE — 88312 SPECIAL STAINS GROUP 1: CPT | Mod: 26

## 2020-01-01 PROCEDURE — 85379 FIBRIN DEGRADATION QUANT: CPT

## 2020-01-01 PROCEDURE — 97162 PT EVAL MOD COMPLEX 30 MIN: CPT

## 2020-01-01 PROCEDURE — 36430 TRANSFUSION BLD/BLD COMPNT: CPT

## 2020-01-01 PROCEDURE — 83036 HEMOGLOBIN GLYCOSYLATED A1C: CPT

## 2020-01-01 PROCEDURE — 84155 ASSAY OF PROTEIN SERUM: CPT

## 2020-01-01 PROCEDURE — 93970 EXTREMITY STUDY: CPT

## 2020-01-01 PROCEDURE — 82607 VITAMIN B-12: CPT

## 2020-01-01 PROCEDURE — 83735 ASSAY OF MAGNESIUM: CPT

## 2020-01-01 PROCEDURE — 82746 ASSAY OF FOLIC ACID SERUM: CPT

## 2020-01-01 PROCEDURE — 86923 COMPATIBILITY TEST ELECTRIC: CPT

## 2020-01-01 PROCEDURE — 88305 TISSUE EXAM BY PATHOLOGIST: CPT

## 2020-01-01 PROCEDURE — 84484 ASSAY OF TROPONIN QUANT: CPT

## 2020-01-01 PROCEDURE — 82728 ASSAY OF FERRITIN: CPT

## 2020-01-01 PROCEDURE — 84165 PROTEIN E-PHORESIS SERUM: CPT

## 2020-01-01 PROCEDURE — 71045 X-RAY EXAM CHEST 1 VIEW: CPT

## 2020-01-01 PROCEDURE — 93970 EXTREMITY STUDY: CPT | Mod: 26

## 2020-01-01 PROCEDURE — 82378 CARCINOEMBRYONIC ANTIGEN: CPT

## 2020-01-01 PROCEDURE — 93010 ELECTROCARDIOGRAM REPORT: CPT

## 2020-01-01 PROCEDURE — 36415 COLL VENOUS BLD VENIPUNCTURE: CPT

## 2020-01-01 PROCEDURE — 88312 SPECIAL STAINS GROUP 1: CPT

## 2020-01-01 PROCEDURE — 74177 CT ABD & PELVIS W/CONTRAST: CPT

## 2020-01-01 PROCEDURE — 96374 THER/PROPH/DIAG INJ IV PUSH: CPT

## 2020-01-01 PROCEDURE — 82962 GLUCOSE BLOOD TEST: CPT

## 2020-01-01 PROCEDURE — 86850 RBC ANTIBODY SCREEN: CPT

## 2020-01-01 PROCEDURE — 80053 COMPREHEN METABOLIC PANEL: CPT

## 2020-01-01 PROCEDURE — 99212 OFFICE O/P EST SF 10 MIN: CPT

## 2020-01-01 PROCEDURE — 71275 CT ANGIOGRAPHY CHEST: CPT

## 2020-01-01 PROCEDURE — 70450 CT HEAD/BRAIN W/O DYE: CPT | Mod: 26

## 2020-01-01 PROCEDURE — 82550 ASSAY OF CK (CPK): CPT

## 2020-01-01 RX ORDER — INSULIN LISPRO 100/ML
VIAL (ML) SUBCUTANEOUS
Refills: 0 | Status: DISCONTINUED | OUTPATIENT
Start: 2020-01-01 | End: 2020-01-01

## 2020-01-01 RX ORDER — MULTIVITAMIN/IRON/FOLIC ACID 18MG-0.4MG
600-400 TABLET ORAL DAILY
Qty: 3 | Refills: 1 | Status: ACTIVE | COMMUNITY
Start: 2020-01-01 | End: 1900-01-01

## 2020-01-01 RX ORDER — GLIMEPIRIDE 1 MG/1
1 TABLET ORAL DAILY
Refills: 0 | Status: DISCONTINUED | COMMUNITY
End: 2020-01-01

## 2020-01-01 RX ORDER — AMITRIPTYLINE HYDROCHLORIDE 10 MG/1
10 TABLET, FILM COATED ORAL
Qty: 30 | Refills: 0 | Status: DISCONTINUED | COMMUNITY
End: 2020-01-01

## 2020-01-01 RX ORDER — ESTRADIOL 0.1 MG/G
0.1 CREAM VAGINAL
Qty: 1 | Refills: 1 | Status: DISCONTINUED | COMMUNITY
Start: 2020-01-01 | End: 2020-01-01

## 2020-01-01 RX ORDER — FUROSEMIDE 40 MG
40 TABLET ORAL ONCE
Refills: 0 | Status: COMPLETED | OUTPATIENT
Start: 2020-01-01 | End: 2020-01-01

## 2020-01-01 RX ORDER — KETOCONAZOLE 20 MG/G
2 CREAM TOPICAL
Qty: 60 | Refills: 0 | Status: DISCONTINUED | COMMUNITY
Start: 2020-01-01

## 2020-01-01 RX ORDER — SODIUM CHLORIDE 9 MG/ML
1000 INJECTION INTRAMUSCULAR; INTRAVENOUS; SUBCUTANEOUS
Refills: 0 | Status: DISCONTINUED | OUTPATIENT
Start: 2020-01-01 | End: 2020-01-01

## 2020-01-01 RX ORDER — METOPROLOL TARTRATE 50 MG/1
50 TABLET, FILM COATED ORAL
Qty: 30 | Refills: 1 | Status: ACTIVE | COMMUNITY
Start: 2020-01-01

## 2020-01-01 RX ORDER — MEMANTINE HYDROCHLORIDE 10 MG/1
1 TABLET ORAL
Qty: 0 | Refills: 0 | DISCHARGE

## 2020-01-01 RX ORDER — SENNA PLUS 8.6 MG/1
2 TABLET ORAL AT BEDTIME
Refills: 0 | Status: DISCONTINUED | OUTPATIENT
Start: 2020-01-01 | End: 2020-01-01

## 2020-01-01 RX ORDER — ASPIRIN 325 MG/1
TABLET, FILM COATED ORAL
Refills: 0 | Status: DISCONTINUED | COMMUNITY
End: 2020-01-01

## 2020-01-01 RX ORDER — BISACODYL 5 MG/1
5 TABLET ORAL
Qty: 90 | Refills: 2 | Status: ACTIVE | COMMUNITY
Start: 2020-01-01 | End: 1900-01-01

## 2020-01-01 RX ORDER — FENOFIBRATE 150 MG/1
CAPSULE ORAL
Refills: 0 | Status: DISCONTINUED | COMMUNITY
End: 2020-01-01

## 2020-01-01 RX ORDER — MENTHOL 4.5 MG/1
20-3 LOZENGE ORAL
Qty: 1 | Refills: 0 | Status: DISCONTINUED | COMMUNITY
Start: 2020-01-01 | End: 2020-01-01

## 2020-01-01 RX ORDER — CYANOCOBALAMIN 500 UG/1
500 SPRAY NASAL
Qty: 1 | Refills: 0 | Status: DISCONTINUED | COMMUNITY
Start: 2020-01-01 | End: 2020-01-01

## 2020-01-01 RX ORDER — MAGNESIUM OXIDE 400 MG ORAL TABLET 241.3 MG
400 TABLET ORAL ONCE
Refills: 0 | Status: COMPLETED | OUTPATIENT
Start: 2020-01-01 | End: 2020-01-01

## 2020-01-01 RX ORDER — SENNA PLUS 8.6 MG/1
2 TABLET ORAL
Qty: 60 | Refills: 0
Start: 2020-01-01 | End: 2021-01-01

## 2020-01-01 RX ORDER — ATORVASTATIN CALCIUM 40 MG/1
40 TABLET, FILM COATED ORAL
Qty: 90 | Refills: 1 | Status: ACTIVE | COMMUNITY
Start: 2020-01-01 | End: 1900-01-01

## 2020-01-01 RX ORDER — METOPROLOL TARTRATE 50 MG
5 TABLET ORAL ONCE
Refills: 0 | Status: COMPLETED | OUTPATIENT
Start: 2020-01-01 | End: 2020-01-01

## 2020-01-01 RX ORDER — CHLORHEXIDINE GLUCONATE 4 %
325 (65 FE) LIQUID (ML) TOPICAL
Qty: 60 | Refills: 2 | Status: DISCONTINUED | COMMUNITY
Start: 2020-01-01 | End: 2020-01-01

## 2020-01-01 RX ORDER — HYDRALAZINE HCL 50 MG
1 TABLET ORAL
Qty: 0 | Refills: 0 | DISCHARGE

## 2020-01-01 RX ORDER — MAGNESIUM OXIDE/MAG AA CHELATE 300 MG
CAPSULE ORAL
Refills: 0 | Status: DISCONTINUED | COMMUNITY
End: 2020-01-01

## 2020-01-01 RX ORDER — ASPIRIN/CALCIUM CARB/MAGNESIUM 324 MG
1 TABLET ORAL
Qty: 30 | Refills: 0
Start: 2020-01-01 | End: 2021-01-01

## 2020-01-01 RX ORDER — GABAPENTIN 100 MG/1
100 CAPSULE ORAL
Qty: 270 | Refills: 0 | Status: DISCONTINUED | COMMUNITY
Start: 2020-01-01 | End: 2020-01-01

## 2020-01-01 RX ORDER — MEMANTINE HYDROCHLORIDE 10 MG/1
10 TABLET, FILM COATED ORAL
Qty: 90 | Refills: 1 | Status: ACTIVE | COMMUNITY
Start: 2020-01-01 | End: 1900-01-01

## 2020-01-01 RX ORDER — LOSARTAN POTASSIUM 100 MG/1
25 TABLET, FILM COATED ORAL DAILY
Refills: 0 | Status: DISCONTINUED | OUTPATIENT
Start: 2020-01-01 | End: 2020-01-01

## 2020-01-01 RX ORDER — IRON SUCROSE 20 MG/ML
200 INJECTION, SOLUTION INTRAVENOUS ONCE
Refills: 0 | Status: COMPLETED | OUTPATIENT
Start: 2020-01-01 | End: 2020-01-01

## 2020-01-01 RX ORDER — SOD SULF/SODIUM/NAHCO3/KCL/PEG
4000 SOLUTION, RECONSTITUTED, ORAL ORAL ONCE
Refills: 0 | Status: COMPLETED | OUTPATIENT
Start: 2020-01-01 | End: 2020-01-01

## 2020-01-01 RX ORDER — FERROUS SULFATE 325(65) MG
1 TABLET ORAL
Qty: 30 | Refills: 0
Start: 2020-01-01 | End: 2021-01-01

## 2020-01-01 RX ORDER — ASPIRIN/CALCIUM CARB/MAGNESIUM 324 MG
81 TABLET ORAL DAILY
Refills: 0 | Status: DISCONTINUED | OUTPATIENT
Start: 2020-01-01 | End: 2020-01-01

## 2020-01-01 RX ORDER — SUMATRIPTAN 50 MG/1
50 TABLET, FILM COATED ORAL
Refills: 0 | Status: DISCONTINUED | COMMUNITY
End: 2020-01-01

## 2020-01-01 RX ORDER — ESCITALOPRAM OXALATE 5 MG/1
5 TABLET ORAL DAILY
Qty: 30 | Refills: 0 | Status: DISCONTINUED | COMMUNITY
End: 2020-01-01

## 2020-01-01 RX ORDER — KRILL/OM-3/DHA/EPA/PHOSPHO/AST 1000-230MG
81 CAPSULE ORAL DAILY
Qty: 30 | Refills: 2 | Status: ACTIVE | COMMUNITY
Start: 2020-01-01

## 2020-01-01 RX ORDER — SITAGLIPTIN AND METFORMIN HYDROCHLORIDE 50; 1000 MG/1; MG/1
50-1000 TABLET, FILM COATED ORAL TWICE DAILY
Refills: 0 | Status: ACTIVE | COMMUNITY

## 2020-01-01 RX ORDER — DONEPEZIL HYDROCHLORIDE 5 MG/1
5 TABLET ORAL
Qty: 30 | Refills: 5 | Status: DISCONTINUED | COMMUNITY
Start: 2020-01-01 | End: 2020-01-01

## 2020-01-01 RX ORDER — LOSARTAN POTASSIUM 100 MG/1
25 TABLET, FILM COATED ORAL
Refills: 0 | Status: DISCONTINUED | OUTPATIENT
Start: 2020-01-01 | End: 2020-01-01

## 2020-01-01 RX ORDER — MEMANTINE HYDROCHLORIDE 5 MG-10 MG
KIT ORAL
Refills: 0 | Status: DISCONTINUED | COMMUNITY
End: 2020-01-01

## 2020-01-01 RX ORDER — FENOFIBRATE,MICRONIZED 130 MG
1 CAPSULE ORAL
Qty: 0 | Refills: 0 | DISCHARGE

## 2020-01-01 RX ORDER — FUROSEMIDE 40 MG
1 TABLET ORAL
Qty: 30 | Refills: 0
Start: 2020-01-01 | End: 2021-01-01

## 2020-01-01 RX ORDER — FUROSEMIDE 40 MG
20 TABLET ORAL DAILY
Refills: 0 | Status: DISCONTINUED | OUTPATIENT
Start: 2020-01-01 | End: 2020-01-01

## 2020-01-01 RX ORDER — POTASSIUM CHLORIDE 20 MEQ
20 PACKET (EA) ORAL ONCE
Refills: 0 | Status: COMPLETED | OUTPATIENT
Start: 2020-01-01 | End: 2020-01-01

## 2020-01-01 RX ORDER — MAGNESIUM SULFATE 500 MG/ML
2 VIAL (ML) INJECTION ONCE
Refills: 0 | Status: COMPLETED | OUTPATIENT
Start: 2020-01-01 | End: 2020-01-01

## 2020-01-01 RX ORDER — FOLIC ACID 1 MG/1
1 TABLET ORAL
Qty: 30 | Refills: 0 | Status: DISCONTINUED | COMMUNITY
Start: 2020-01-01 | End: 2020-01-01

## 2020-01-01 RX ORDER — AMITRIPTYLINE HCL 25 MG
1 TABLET ORAL
Qty: 0 | Refills: 0 | DISCHARGE

## 2020-01-01 RX ORDER — MEMANTINE HYDROCHLORIDE 28 MG/1
28 CAPSULE, EXTENDED RELEASE ORAL
Refills: 0 | Status: DISCONTINUED | COMMUNITY
Start: 2020-01-01 | End: 2020-01-01

## 2020-01-01 RX ORDER — METOPROLOL SUCCINATE 50 MG/1
50 TABLET, EXTENDED RELEASE ORAL
Refills: 0 | Status: DISCONTINUED | COMMUNITY
End: 2020-01-01

## 2020-01-01 RX ORDER — DONEPEZIL HYDROCHLORIDE 5 MG/1
5 TABLET ORAL DAILY
Refills: 0 | Status: ACTIVE | COMMUNITY

## 2020-01-01 RX ORDER — PANTOPRAZOLE SODIUM 20 MG/1
1 TABLET, DELAYED RELEASE ORAL
Qty: 30 | Refills: 0
Start: 2020-01-01 | End: 2021-01-01

## 2020-01-01 RX ORDER — LOSARTAN POTASSIUM 100 MG/1
1 TABLET, FILM COATED ORAL
Qty: 0 | Refills: 0 | DISCHARGE

## 2020-01-01 RX ORDER — DONEPEZIL HYDROCHLORIDE 10 MG/1
5 TABLET, FILM COATED ORAL AT BEDTIME
Refills: 0 | Status: DISCONTINUED | OUTPATIENT
Start: 2020-01-01 | End: 2020-01-01

## 2020-01-01 RX ORDER — LOSARTAN POTASSIUM 100 MG/1
1 TABLET, FILM COATED ORAL
Qty: 60 | Refills: 0
Start: 2020-01-01 | End: 2021-01-01

## 2020-01-01 RX ORDER — METOPROLOL TARTRATE 50 MG
50 TABLET ORAL
Refills: 0 | Status: DISCONTINUED | OUTPATIENT
Start: 2020-01-01 | End: 2020-01-01

## 2020-01-01 RX ORDER — DENOSUMAB 60 MG/ML
60 INJECTION SUBCUTANEOUS
Qty: 60 | Refills: 0 | Status: DISCONTINUED | COMMUNITY
Start: 2020-01-01 | End: 2020-01-01

## 2020-01-01 RX ORDER — PANTOPRAZOLE 20 MG/1
20 TABLET, DELAYED RELEASE ORAL
Qty: 30 | Refills: 0 | Status: DISCONTINUED | COMMUNITY
Start: 2020-01-01 | End: 2020-01-01

## 2020-01-01 RX ORDER — BISACODYL 5 MG/1
5 TABLET, DELAYED RELEASE ORAL
Refills: 0 | Status: DISCONTINUED | COMMUNITY
End: 2020-01-01

## 2020-01-01 RX ORDER — ATORVASTATIN CALCIUM 80 MG/1
40 TABLET, FILM COATED ORAL AT BEDTIME
Refills: 0 | Status: DISCONTINUED | OUTPATIENT
Start: 2020-01-01 | End: 2020-01-01

## 2020-01-01 RX ORDER — AZITHROMYCIN 250 MG/1
250 TABLET, FILM COATED ORAL
Qty: 6 | Refills: 0 | Status: DISCONTINUED | COMMUNITY
Start: 2020-01-01

## 2020-01-01 RX ORDER — GLIMEPIRIDE 1 MG
1 TABLET ORAL
Qty: 0 | Refills: 0 | DISCHARGE

## 2020-01-01 RX ORDER — MEMANTINE HYDROCHLORIDE 10 MG/1
10 TABLET ORAL DAILY
Refills: 0 | Status: DISCONTINUED | OUTPATIENT
Start: 2020-01-01 | End: 2020-01-01

## 2020-01-01 RX ORDER — PANTOPRAZOLE SODIUM 20 MG/1
40 TABLET, DELAYED RELEASE ORAL
Refills: 0 | Status: DISCONTINUED | OUTPATIENT
Start: 2020-01-01 | End: 2020-01-01

## 2020-01-01 RX ADMIN — SENNA PLUS 2 TABLET(S): 8.6 TABLET ORAL at 21:37

## 2020-01-01 RX ADMIN — Medication 2: at 08:12

## 2020-01-01 RX ADMIN — Medication 81 MILLIGRAM(S): at 11:48

## 2020-01-01 RX ADMIN — Medication 20 MILLIGRAM(S): at 06:16

## 2020-01-01 RX ADMIN — Medication 1: at 08:25

## 2020-01-01 RX ADMIN — PANTOPRAZOLE SODIUM 40 MILLIGRAM(S): 20 TABLET, DELAYED RELEASE ORAL at 06:54

## 2020-01-01 RX ADMIN — LOSARTAN POTASSIUM 25 MILLIGRAM(S): 100 TABLET, FILM COATED ORAL at 18:03

## 2020-01-01 RX ADMIN — Medication 50 MILLIGRAM(S): at 05:56

## 2020-01-01 RX ADMIN — Medication 50 MILLIGRAM(S): at 17:38

## 2020-01-01 RX ADMIN — Medication 1: at 17:39

## 2020-01-01 RX ADMIN — Medication 1 TABLET(S): at 11:48

## 2020-01-01 RX ADMIN — Medication 50 GRAM(S): at 10:38

## 2020-01-01 RX ADMIN — Medication 2: at 13:22

## 2020-01-01 RX ADMIN — SENNA PLUS 2 TABLET(S): 8.6 TABLET ORAL at 22:19

## 2020-01-01 RX ADMIN — Medication 1 TABLET(S): at 12:58

## 2020-01-01 RX ADMIN — Medication 3: at 12:14

## 2020-01-01 RX ADMIN — LOSARTAN POTASSIUM 25 MILLIGRAM(S): 100 TABLET, FILM COATED ORAL at 05:56

## 2020-01-01 RX ADMIN — Medication 50 MILLIGRAM(S): at 06:16

## 2020-01-01 RX ADMIN — Medication 50 GRAM(S): at 12:23

## 2020-01-01 RX ADMIN — Medication 1: at 12:02

## 2020-01-01 RX ADMIN — MEMANTINE HYDROCHLORIDE 10 MILLIGRAM(S): 10 TABLET ORAL at 13:23

## 2020-01-01 RX ADMIN — Medication 20 MILLIGRAM(S): at 05:56

## 2020-01-01 RX ADMIN — Medication 50 MILLIGRAM(S): at 17:18

## 2020-01-01 RX ADMIN — DONEPEZIL HYDROCHLORIDE 5 MILLIGRAM(S): 10 TABLET, FILM COATED ORAL at 22:19

## 2020-01-01 RX ADMIN — Medication 81 MILLIGRAM(S): at 12:12

## 2020-01-01 RX ADMIN — Medication 50 MILLIGRAM(S): at 17:20

## 2020-01-01 RX ADMIN — SENNA PLUS 2 TABLET(S): 8.6 TABLET ORAL at 22:43

## 2020-01-01 RX ADMIN — Medication 2: at 18:04

## 2020-01-01 RX ADMIN — ATORVASTATIN CALCIUM 40 MILLIGRAM(S): 80 TABLET, FILM COATED ORAL at 22:43

## 2020-01-01 RX ADMIN — MAGNESIUM OXIDE 400 MG ORAL TABLET 400 MILLIGRAM(S): 241.3 TABLET ORAL at 11:58

## 2020-01-01 RX ADMIN — Medication 20 MILLIGRAM(S): at 19:17

## 2020-01-01 RX ADMIN — ATORVASTATIN CALCIUM 40 MILLIGRAM(S): 80 TABLET, FILM COATED ORAL at 21:38

## 2020-01-01 RX ADMIN — MEMANTINE HYDROCHLORIDE 10 MILLIGRAM(S): 10 TABLET ORAL at 13:45

## 2020-01-01 RX ADMIN — Medication 1: at 17:17

## 2020-01-01 RX ADMIN — Medication 3: at 11:23

## 2020-01-01 RX ADMIN — Medication 50 MILLIGRAM(S): at 06:36

## 2020-01-01 RX ADMIN — Medication 4000 MILLILITER(S): at 14:01

## 2020-01-01 RX ADMIN — Medication 50 MILLIGRAM(S): at 06:19

## 2020-01-01 RX ADMIN — Medication 5 MILLIGRAM(S): at 01:16

## 2020-01-01 RX ADMIN — ATORVASTATIN CALCIUM 40 MILLIGRAM(S): 80 TABLET, FILM COATED ORAL at 21:17

## 2020-01-01 RX ADMIN — Medication 50 MILLIGRAM(S): at 18:06

## 2020-01-01 RX ADMIN — MEMANTINE HYDROCHLORIDE 10 MILLIGRAM(S): 10 TABLET ORAL at 12:14

## 2020-01-01 RX ADMIN — MEMANTINE HYDROCHLORIDE 10 MILLIGRAM(S): 10 TABLET ORAL at 11:48

## 2020-01-01 RX ADMIN — IRON SUCROSE 110 MILLIGRAM(S): 20 INJECTION, SOLUTION INTRAVENOUS at 11:54

## 2020-01-01 RX ADMIN — DONEPEZIL HYDROCHLORIDE 5 MILLIGRAM(S): 10 TABLET, FILM COATED ORAL at 22:43

## 2020-01-01 RX ADMIN — SENNA PLUS 2 TABLET(S): 8.6 TABLET ORAL at 21:17

## 2020-01-01 RX ADMIN — Medication 1: at 08:47

## 2020-01-01 RX ADMIN — Medication 81 MILLIGRAM(S): at 12:57

## 2020-01-01 RX ADMIN — DONEPEZIL HYDROCHLORIDE 5 MILLIGRAM(S): 10 TABLET, FILM COATED ORAL at 22:52

## 2020-01-01 RX ADMIN — Medication 50 MILLIGRAM(S): at 05:54

## 2020-01-01 RX ADMIN — Medication 1: at 17:19

## 2020-01-01 RX ADMIN — Medication 1 TABLET(S): at 12:14

## 2020-01-01 RX ADMIN — SENNA PLUS 2 TABLET(S): 8.6 TABLET ORAL at 22:44

## 2020-01-01 RX ADMIN — Medication 1 TABLET(S): at 13:45

## 2020-01-01 RX ADMIN — DONEPEZIL HYDROCHLORIDE 5 MILLIGRAM(S): 10 TABLET, FILM COATED ORAL at 21:38

## 2020-01-01 RX ADMIN — MEMANTINE HYDROCHLORIDE 10 MILLIGRAM(S): 10 TABLET ORAL at 12:58

## 2020-01-01 RX ADMIN — Medication 81 MILLIGRAM(S): at 12:59

## 2020-01-01 RX ADMIN — LOSARTAN POTASSIUM 25 MILLIGRAM(S): 100 TABLET, FILM COATED ORAL at 18:05

## 2020-01-01 RX ADMIN — Medication 50 MILLIGRAM(S): at 18:03

## 2020-01-01 RX ADMIN — LOSARTAN POTASSIUM 25 MILLIGRAM(S): 100 TABLET, FILM COATED ORAL at 06:16

## 2020-01-01 RX ADMIN — Medication 1: at 12:58

## 2020-01-01 RX ADMIN — Medication 81 MILLIGRAM(S): at 11:56

## 2020-01-01 RX ADMIN — Medication 1 TABLET(S): at 13:23

## 2020-01-01 RX ADMIN — Medication 1 TABLET(S): at 12:43

## 2020-01-01 RX ADMIN — ATORVASTATIN CALCIUM 40 MILLIGRAM(S): 80 TABLET, FILM COATED ORAL at 22:19

## 2020-01-01 RX ADMIN — Medication 50 MILLIGRAM(S): at 05:17

## 2020-01-01 RX ADMIN — Medication 20 MILLIGRAM(S): at 22:52

## 2020-01-01 RX ADMIN — Medication 20 MILLIEQUIVALENT(S): at 08:59

## 2020-01-01 RX ADMIN — LOSARTAN POTASSIUM 25 MILLIGRAM(S): 100 TABLET, FILM COATED ORAL at 13:23

## 2020-01-01 RX ADMIN — Medication 40 MILLIGRAM(S): at 06:03

## 2020-01-01 RX ADMIN — ATORVASTATIN CALCIUM 40 MILLIGRAM(S): 80 TABLET, FILM COATED ORAL at 21:16

## 2020-01-01 RX ADMIN — Medication 2: at 12:08

## 2020-01-01 RX ADMIN — ATORVASTATIN CALCIUM 40 MILLIGRAM(S): 80 TABLET, FILM COATED ORAL at 22:52

## 2020-01-01 RX ADMIN — Medication 20 MILLIGRAM(S): at 06:36

## 2020-01-01 RX ADMIN — Medication 81 MILLIGRAM(S): at 13:23

## 2020-01-01 RX ADMIN — LOSARTAN POTASSIUM 25 MILLIGRAM(S): 100 TABLET, FILM COATED ORAL at 05:54

## 2020-01-01 RX ADMIN — Medication 1: at 08:22

## 2020-01-01 RX ADMIN — DONEPEZIL HYDROCHLORIDE 5 MILLIGRAM(S): 10 TABLET, FILM COATED ORAL at 22:44

## 2020-01-01 RX ADMIN — Medication 20 MILLIGRAM(S): at 05:17

## 2020-01-01 RX ADMIN — Medication 1 TABLET(S): at 11:57

## 2020-01-01 RX ADMIN — Medication 50 MILLIGRAM(S): at 18:10

## 2020-01-01 RX ADMIN — DONEPEZIL HYDROCHLORIDE 5 MILLIGRAM(S): 10 TABLET, FILM COATED ORAL at 21:16

## 2020-01-01 RX ADMIN — ATORVASTATIN CALCIUM 40 MILLIGRAM(S): 80 TABLET, FILM COATED ORAL at 22:44

## 2020-01-01 RX ADMIN — SENNA PLUS 2 TABLET(S): 8.6 TABLET ORAL at 21:16

## 2020-01-01 RX ADMIN — SENNA PLUS 2 TABLET(S): 8.6 TABLET ORAL at 22:52

## 2020-01-01 RX ADMIN — Medication 2: at 17:39

## 2020-01-01 RX ADMIN — MEMANTINE HYDROCHLORIDE 10 MILLIGRAM(S): 10 TABLET ORAL at 11:56

## 2020-01-01 RX ADMIN — Medication 20 MILLIGRAM(S): at 06:19

## 2020-01-01 RX ADMIN — MEMANTINE HYDROCHLORIDE 10 MILLIGRAM(S): 10 TABLET ORAL at 11:23

## 2020-01-01 RX ADMIN — DONEPEZIL HYDROCHLORIDE 5 MILLIGRAM(S): 10 TABLET, FILM COATED ORAL at 21:17

## 2020-01-01 RX ADMIN — Medication 20 MILLIGRAM(S): at 05:54

## 2020-01-01 RX ADMIN — Medication 1: at 08:27

## 2020-01-06 NOTE — ED PROVIDER NOTE - NS ED MD DISPO ADMITTING SERVICE
Patient is a 69 yo F with history of HTN, hyperlipidemia, anxiety, depression and panic attacks here for persistent anxiety. Patient reports she feels like her bones ache when she takes Lexapro. This has been going on for a month. Most recently, anxiety has been worse since Saturday. She states she can't leave home and she feels like she can't do anything. She called her psychiatrist on Saturday who called back today when patient was on way to the hospital. Denies chest pain, shortness of breath. Previously when patient felt like this, she was hospitalized. Patient denies recent illness. No SI/ HI/ hallucinations.     VS noted  Gen: anxious  HEENT: EOMI, mmm  Lungs: CTAB/L no C/ W /R   CVS: RRR   Abd; Soft non tender, non distended   Ext: no edema  Skin: no rash  Neuro AAOx3 non focal clear speech  a/p: palpitations/ anxiety - ekg wnl - plan for labs, psych eval as patient reports not functioning at home.   - Nixon HARMAN FMED

## 2020-03-02 NOTE — H&P ADULT - EYES
Future scripts left voice message stating that they need more information-if patient failed other formulary meds    Ph: [phone] EOMI; PERRL; no drainage or redness

## 2020-03-04 NOTE — ED PROVIDER NOTE - MEDICAL DECISION MAKING DETAILS
Patient:   LENARD WALLS            MRN: [id number]            FIN: [id number]              Age:   53 years     Sex:  MALE     :  67   Associated Diagnoses:   None   Author:   DAR LARA     Chief Complaint   neck and arm pain     History of Present Illness   Patient is a 53-year-old gentleman who presents to the pain clinic for evaluation.  Patient complains of neck pain with radicular symptoms involving both upper extremities.  Patient gives a pain score of 7/10.  Pain is described as a throbbing, burning, tingling, aching sensation with numbness.  Pain is worse with activity and improves slightly with rest.  Pain radiates down both arms to the fingers along the lateral aspect.  Patient takes  meloxicam which is minimally helpful.  Patient has a history of cervical fusion at C5 C7 done by Dr. Clark 10 years ago.  Patient denies any bowel bladder dysfunction.  No recent unintended weight loss.  No fevers or chills.  Patient has received numerous cervical epidural steroid injections in the past which helped relieve the pain for a few months at a time.  Last injection done was in  at [facility].  Patient has a cervical MRI  which was done in 2020 which was reviewed.  Patient has multilevel stenosis and MRI shows ACDF at C5-C7.      Review of Systems   Constitutional:  Negative except as documented in history of present illness.   Eye:  Negative except as documented in history of present illness.   Ear/Nose/Mouth/Throat:  Negative except as documented in history of present illness.   Cardiovascular:  Negative except as documented in history of present illness.   Respiratory:  Negative except as documented in history of present illness.   Gastrointestinal:  Negative except as documented in history of present illness.   Genitourinary:  Negative except as documented in history of present illness.   Musculoskeletal:  Negative except as documented in history of present illness.    Integumentary:  Negative except as documented in history of present illness.   Hematology/Lymphatics:  Negative except as documented in history of present illness.   Neurologic:  Negative except as documented in history of present illness.   Endocrine:  Negative except as documented in history of present illness.   Psychiatric:  Negative except as documented in history of present illness.      Health Status   Allergies: Allergies (3) Active Reaction  Bee Stings None Documented  sulfa drugs None Documented  zinc sulfate 0.25% ophthalmic H/O: visual disturbance  solution      Current medications: Home Medications (4) Active  amLODIPine oral 5 mg tablet 5 mg = 1 tab, Oral, Daily  ibuprofen , PRN  meloxicam oral 7.5 mg tablet (Mobic) 7.5 mg = 1 tab, Oral, Daily  minocycline 100 mg oral tablet 100 mg = 1 tab, Oral, Q12H        Histories   Past Med History: Arthritis  Chronic pain  Risk factors for obstructive sleep apnea      Family History:    CA - Cancer  BROTHER  Diabetes mellitus type 2  MOTHER  Myocardial infarction  BROTHER  Hypertension  MOTHER    Procedure History: Pain consultation  Vasectomy      Social History       Alcohol  Details: Alcohol Abuse in Household: No.  Details: Use: Current.  Frequency: 1-2 times per month.  Exercise  Details: Exercise: Regularly.  Substance Abuse  Details: Substance Abuse in Household: No.  Details: Use: Current.  Type: Marijuana.  Frequency: 1-2 times per week.  Tobacco  Details: Smoker in [place]: Yes.  Smoked/Smokeless Tobacco Last 30 Days: Yes.  Smoking Tobacco Use: Current every day smoker.  Smokeless Tobacco Use Never.  Details: Used in Last 12 Months: Yes.  Use: Current every day smoker.  Type: Cigarettes.  .       Physical Examination   Vital Signs:   Vitals between:   03-MAR-2020 11:36:41   TO   04-MAR-2020 11:36:41                   LAST RESULT MINIMUM MAXIMUM  Heart Rate 104 104 104  Respiratory Rate 16 16 16  NISBP           149 149 149  NIDBP           92 92  92  NIMBP           111 111 111      Pain assessment:  Clinic Assessment:.    Patient has negative Lashay.  5/5 strength in extremities.  Sensation to light touch intact.  Unrestricted range of motion of cervical spine.  MRI results from March 2020 as stated in HPI above.      Review / Management   Documentation reviewed:  Reviewed prior records.    Radiological evidence of substantial imaging abnormality is present in the medical record:  [_]Central disc herniation  [x_]Severe degenerative disc disease  [_]Central spinal stenosis  Patient has failed non surgical, non-injection care for four weeks (from onset of pain) including:  [x_]Appropriate oral mediations: _  [_]Physical therapy to the extent tolerated  Patient is excluded from the 4 week waiting period due to:  [_]Pain from Herpes Zoster  [_]Has Moderate pain with significant functional loss at work or home  [x_]Severe pain unresponsive to outpatient medical management  [_]Inability to tolerate non-surgical, non-injection care due to co-existing medical condition(s)  [x_]Prior successful injections for same specific condition with relief of at least 3 months duration     Impression and Plan   Patient is a 53-year-old gentleman with a history of C5-C7 ACDF by  who presents for evaluation  Clinical diagnosis: Cervical stenosis with radiculopathy status post ACDF  Patient was advised that he can be managed expectantly or with injections as indicated.  Patient would like to be scheduled for an injection and proposed level of injection will be C7-T1 under fluoroscopy for therapeutic purposes.  Benefits, risks and potential complications were discussed with the patient.  Patient was encouraged to continue lifestyle modifications including appropriate diet and exercise as tolerated.  Advanced Directives  [X]Patient does not have Living will or Power of  and is not currently interested in any information.  [_]Patient has Living Will  [_]Patient  has Power of   Time Spent with patient  Total encounter (face to face) time: 45  minutes.   Greater than 50% of the time was spent in counseling, discussing disease and/or coordination of care.   86 yo F with confusion, HA, and dizziness. Concern for CVA vs hyponatremia vs infectious process. Obtain stat head CT, EKG, labs, and reassess.

## 2020-07-15 PROBLEM — Z78.9 CAFFEINE USE: Status: ACTIVE | Noted: 2020-01-01

## 2020-07-15 PROBLEM — Z86.39 HISTORY OF DIABETES MELLITUS: Status: RESOLVED | Noted: 2020-01-01 | Resolved: 2020-01-01

## 2020-07-15 PROBLEM — Z86.59 HISTORY OF DEMENTIA: Status: RESOLVED | Noted: 2020-01-01 | Resolved: 2020-01-01

## 2020-07-15 PROBLEM — Z86.79 HISTORY OF HYPERTENSION: Status: RESOLVED | Noted: 2020-01-01 | Resolved: 2020-01-01

## 2020-07-15 PROBLEM — Z78.9 NON-SMOKER: Status: ACTIVE | Noted: 2020-01-01

## 2020-07-15 NOTE — ASSESSMENT
[FreeTextEntry1] : The patient has memory problems progressing, she also has h/o strokes and headaches, rasing concern for vascular dementia vs. amyloid angiopathy.  Will get MRI brain to evaluate, as well as labs for reversible causes of dementia.

## 2020-07-15 NOTE — HISTORY OF PRESENT ILLNESS
[FreeTextEntry1] : The patient is here for evaluation of dementia, she has h/o of strokes dating back as far as the 1990's.  She was recently hospitalized at [facility] with worsening memory problems and has had difficulty with behavior since then (no aggression).  \par \par The daughter noted memory problems about 6 months ago.  She started wondering and has times when she is unable to recognize daughter.  She is no longer able to do cooking/ cleaning at home.  She has trouble with ambulation and is shaking, has diffuse weakness and able to walk only couch to bathroom.  She had a fall about 1 month ago.  She requires assistance with getting dressed, bathing.\par Sometimes able to eat by herself and sometimes she requires assistance.   Her daughter does the finances at home but the patient has never done finances.  She lives with the daughter.\par \par \par

## 2020-07-15 NOTE — REVIEW OF SYSTEMS
[Depression] : depression [As Noted in HPI] : as noted in HPI [Easy Bruising] : a tendency for easy bruising [Muscle Weakness] : muscle weakness [Fever] : no fever [Eyesight Problems] : no eyesight problems [Loss Of Hearing] : no hearing loss [Chest Pain] : no chest pain [Cough] : no cough [Vomiting] : no vomiting [Incontinence] : no incontinence [de-identified] : generalized [Itching] : no itching

## 2020-07-15 NOTE — DATA REVIEWED
[de-identified] : CD: Impression: Moderate (50-69%) stenosis in the left internal carotid \par artery by velocity criteria.\par \par No hemodynamically significant stenosis in the right internal carotid \par artery by velocity criteria.\par \par \par Ventricular Rate 53 BPM\par \par Atrial Rate 53 BPM\par \par P-R Interval 172 ms\par \par QRS Duration 96 ms\par \par Q-T Interval 516 ms\par \par QTC Calculation(Bezet) 484 ms\par \par P Axis 72 degrees\par \par R Axis -18 degrees\par \par T Axis 115 degrees\par \par Diagnosis Line Sinus bradycardia\par ST & T wave abnormality, consider lateral ischemia\par Prolonged QT\par Abnormal ECG\par \par Confirmed by LAURA MUNOZ ([id number]) on 10/4/2019 9:04:04 PM\par HbA1C 7.4\par \par CTH (images reviwed): No CT evidence for acute intracranial abnormality. No significant \par interval changes compared to the prior study. MRI brain may be obtained \par for further evaluation as clinically indicated.

## 2020-07-15 NOTE — PHYSICAL EXAM
[General Appearance - Alert] : alert [General Appearance - In No Acute Distress] : in no acute distress [Person] : oriented to person [Concentration Intact] : normal concentrating ability [Visual Intact] : visual attention was ~T not ~L decreased [Vocabulary] : adequate range of vocabulary [Fluency] : fluency intact [Cranial Nerves Optic (II)] : visual acuity intact bilaterally,  visual fields full to confrontation, pupils equal round and reactive to light [Cranial Nerves Oculomotor (III)] : extraocular motion intact [Cranial Nerves Facial (VII)] : face symmetrical [Cranial Nerves Trigeminal (V)] : facial sensation intact symmetrically [Cranial Nerves Glossopharyngeal (IX)] : tongue and palate midline [Cranial Nerves Vestibulocochlear (VIII)] : hearing was intact bilaterally [Cranial Nerves Accessory (XI - Cranial And Spinal)] : head turning and shoulder shrug symmetric [Cranial Nerves Hypoglossal (XII)] : there was no tongue deviation with protrusion [Motor Tone] : muscle tone was normal in all four extremities [Non-ambulatory] : Non-ambulatory [Involuntary Movements] : no involuntary movements were seen [Sensation Tactile Decrease] : light touch was intact [1+] : Patella left 1+ [Full Pulse] : the pedal pulses are present [Place] : disoriented to place [Time] : disoriented to time [Registration Intact] : recent registration memory impaired [Naming Objects] : difficulty naming common objects [Repeating Phrases] : difficulty repeating a phrase [Comprehension] : comprehension not intact [Plantar Reflex Right Only] : normal on the right [Coordination - Dysmetria Impaired Finger-to-Nose Bilateral] : not present [Coordination - Dysmetria Impaired Heel-to-Shin Bilateral] : not present [FreeTextEntry5] : fundi not visualized [FreeTextEntry6] : moves bl arms and legs equally [Plantar Reflex Left Only] : normal on the left

## 2020-07-17 PROBLEM — I65.29 STENOSIS OF CAROTID ARTERY, UNSPECIFIED LATERALITY: Status: ACTIVE | Noted: 2020-01-01

## 2020-07-19 PROBLEM — G62.9 NEUROPATHY: Status: ACTIVE | Noted: 2020-01-01

## 2020-07-19 PROBLEM — F32.9 DEPRESSION, UNSPECIFIED DEPRESSION TYPE: Status: ACTIVE | Noted: 2020-01-01

## 2020-07-19 PROBLEM — G47.00 INSOMNIA, UNSPECIFIED TYPE: Status: ACTIVE | Noted: 2020-01-01

## 2020-07-19 PROBLEM — N95.2 VAGINAL ATROPHY: Status: ACTIVE | Noted: 2020-01-01

## 2020-07-19 PROBLEM — M81.0 OSTEOPOROSIS, UNSPECIFIED OSTEOPOROSIS TYPE, UNSPECIFIED PATHOLOGICAL FRACTURE PRESENCE: Status: ACTIVE | Noted: 2020-01-01

## 2020-07-19 PROBLEM — I25.10 CORONARY ARTERY DISEASE INVOLVING NATIVE CORONARY ARTERY WITHOUT ANGINA PECTORIS, UNSPECIFIED WHETHER NATIVE OR TRANSPLANTED HEART: Status: ACTIVE | Noted: 2020-01-01

## 2020-07-20 PROBLEM — F03.91 DEMENTIA WITH BEHAVIORAL DISTURBANCE, UNSPECIFIED DEMENTIA TYPE: Status: ACTIVE | Noted: 2020-01-01

## 2020-07-20 PROBLEM — R32 URINARY INCONTINENCE, UNSPECIFIED TYPE: Status: ACTIVE | Noted: 2020-01-01

## 2020-07-20 PROBLEM — K59.09 CHRONIC CONSTIPATION: Status: ACTIVE | Noted: 2020-01-01

## 2020-07-20 PROBLEM — R41.3 MEMORY PROBLEM: Status: ACTIVE | Noted: 2020-01-01

## 2020-07-20 PROBLEM — R63.4 WEIGHT LOSS: Status: ACTIVE | Noted: 2020-01-01

## 2020-07-20 NOTE — HEALTH RISK ASSESSMENT
[No] : In the past 12 months have you used drugs other than those required for medical reasons? No [One fall no injury in past year] : Patient reported one fall in the past year without injury [0] : 2) Feeling down, depressed, or hopeless: Not at all (0) [Patient declined PAP Smear] : Patient declined PAP Smear [Patient declined bone density test] : Patient declined bone density test [Patient declined colonoscopy] : Patient declined colonoscopy [HIV Test offered] : HIV Test offered [Hepatitis C test offered] : Hepatitis C test offered [] : No [[id number]] : 0 [Change in mental status noted] : No change in mental status noted [Language] : denies difficulty with language [Behavior] : denies difficulty with behavior [Handling Complex Tasks] : denies difficulty handling complex tasks [Reasoning] : denies difficulty with reasoning [Spatial Ability and Orientation] : denies difficulty with spatial ability and orientation [de-identified] : nend help for all er adls

## 2020-07-20 NOTE — PHYSICAL EXAM
[No Acute Distress] : no acute distress [Well Nourished] : well nourished [Well Developed] : well developed [Normal Sclera/Conjunctiva] : normal sclera/conjunctiva [PERRL] : pupils equal round and reactive to light [EOMI] : extraocular movements intact [No JVD] : no jugular venous distention [No Respiratory Distress] : no respiratory distress  [Clear to Auscultation] : lungs were clear to auscultation bilaterally [No Accessory Muscle Use] : no accessory muscle use [Normal Rate] : normal rate  [Regular Rhythm] : with a regular rhythm [Normal S1, S2] : normal S1 and S2 [No Murmur] : no murmur heard [No Carotid Bruits] : no carotid bruits [No Abdominal Bruit] : a ~M bruit was not heard ~T in the abdomen [Pedal Pulses Present] : the pedal pulses are present [Coordination Grossly Intact] : coordination grossly intact [No Focal Deficits] : no focal deficits [Normal Gait] : normal gait [Soft] : abdomen soft [Non Tender] : non-tender [Non-distended] : non-distended [Normal Bowel Sounds] : normal bowel sounds [Normal Posterior Cervical Nodes] : no posterior cervical lymphadenopathy [No CVA Tenderness] : no CVA  tenderness [Normal Anterior Cervical Nodes] : no anterior cervical lymphadenopathy [No Spinal Tenderness] : no spinal tenderness [No Joint Swelling] : no joint swelling [Grossly Normal Strength/Tone] : grossly normal strength/tone [de-identified] : a& o X1 chronic

## 2020-07-20 NOTE — REVIEW OF SYSTEMS
[Confusion] : confusion [Memory Loss] : memory loss [Chills] : no chills [Fever] : no fever [Pain] : no pain [Discharge] : no discharge [Nasal Discharge] : no nasal discharge [Hearing Loss] : no hearing loss [Earache] : no earache [Chest Pain] : no chest pain [Palpitations] : no palpitations [Orthopnea] : no orthopnea [Lower Ext Edema] : no lower extremity edema [Shortness Of Breath] : no shortness of breath [Abdominal Pain] : no abdominal pain [Wheezing] : no wheezing [Cough] : no cough [Diarrhea] : diarrhea [Nausea] : no nausea [Constipation] : no constipation [Vomiting] : no vomiting [Incontinence] : no incontinence [Dysuria] : no dysuria [Heartburn] : no heartburn [Hematuria] : no hematuria [Joint Pain] : no joint pain [Headache] : no headache [Dizziness] : no dizziness [Joint Stiffness] : no joint stiffness [Muscle Pain] : no muscle pain [Fainting] : no fainting [Insomnia] : no insomnia [Unsteady Walking] : no ataxia [Suicidal] : not suicidal [Anxiety] : no anxiety [Depression] : no depression

## 2020-07-20 NOTE — HISTORY OF PRESENT ILLNESS
[de-identified] : 87 y.o F w/PMHx of severe dementia A& o X 2, walking with cane, diastolic CHF, DMII, HTN,Hx of stroke in 1990,  prior stable meningoma, B/L femoral stents in 2010, and CAD X1 S/P cardiac stent  in 2010 comes in to establish care \par \par Pt speak Vietnamese and accompany by her daughter Cordell Koroma: who is her aid which pt prefer to ac\par \par last time saw PCP was 05/2020 \par \par son Heather valdez as proxy \par \par pt was in [facility] hospitalized in 06/28/2020- 06/02/2020 due to AMS; she was test covid19 with negative results; per pt 's daughter, pt had CT head done and stated that he results was negative; pt was also evaluated by neuro during the hospitalization per daughter and was diagnosed with advanced dementia; \par report form [facility]: \par CT head 5/28/20:\par IMPRESSION:\par No acute intracranial pathology.\par No evidence for intracranial hemorrhage or mass.\par Stable appearing calcified meningioma, left parietal convexity.\par Prominence of the ventricles and sulci are consistent with age-appropriate\par volume loss. Mild microvascular ischemic disease within the hemispheric\par white matter is slightly increased from prior imaging.\par \par MRI brain with and without contrast, 12/13/17:\par IMPRESSION:\par 1. MINIMAL CHRONIC ISCHEMIC CHANGE, SUPRATENTORIAL WHITE MATTER.\par 2. INCIDENTAL 13 X 10 MM CALCIFIED MENINGIOMA, RIGHT POSTERIOR PARIETAL\par MEDIAL CONVEXITY.\par 2. NO ACUTE INFARCT, PARENCHYMAL MASS, HEMORRHAGE OR HYDROCEPHALUS.\par \par has underline dementia; daughter noticed that pt;s mental status change in 01/2020 and since then more  lethargy and worsening of memory loss; loss of appetite and weight loss\par \par pt is current saw Neuro and pending for MRI and labs\par \par Last TTE is 10/2019 showed mild to moderate mitral valve regurgitation, mild to moderate aortic regurgitation, severe dilate Lt atrium;   and grade II diastolic dysfunction\par \par \par has never had colonoscopy or mammo done before; \par \par has not saw CARDIO since  2010; Saw ophthal in 01/2020; saw podiatry in 07/2020( every 2 months)\par \par daughter act as home aid; need help with all ADLs and IADLs by daughter; \par

## 2020-08-01 PROBLEM — R80.9 MICROALBUMINURIA: Status: ACTIVE | Noted: 2020-01-01

## 2020-08-01 NOTE — PHYSICAL EXAM
[No Acute Distress] : no acute distress [Well Nourished] : well nourished [Normal Sclera/Conjunctiva] : normal sclera/conjunctiva [PERRL] : pupils equal round and reactive to light [Well Developed] : well developed [No JVD] : no jugular venous distention [EOMI] : extraocular movements intact [No Respiratory Distress] : no respiratory distress  [No Accessory Muscle Use] : no accessory muscle use [Clear to Auscultation] : lungs were clear to auscultation bilaterally [Normal S1, S2] : normal S1 and S2 [Normal Rate] : normal rate  [Regular Rhythm] : with a regular rhythm [No Murmur] : no murmur heard [No Carotid Bruits] : no carotid bruits [No Abdominal Bruit] : a ~M bruit was not heard ~T in the abdomen [Pedal Pulses Present] : the pedal pulses are present [Soft] : abdomen soft [Non Tender] : non-tender [Non-distended] : non-distended [Normal Bowel Sounds] : normal bowel sounds [Normal Posterior Cervical Nodes] : no posterior cervical lymphadenopathy [Normal Anterior Cervical Nodes] : no anterior cervical lymphadenopathy [No CVA Tenderness] : no CVA  tenderness [No Joint Swelling] : no joint swelling [No Spinal Tenderness] : no spinal tenderness [No Focal Deficits] : no focal deficits [Grossly Normal Strength/Tone] : grossly normal strength/tone [Coordination Grossly Intact] : coordination grossly intact [Normal Gait] : normal gait [de-identified] : on wheel chair [de-identified] : a& o X1 chronic

## 2020-08-01 NOTE — HISTORY OF PRESENT ILLNESS
[Family Member] : family member [de-identified] : 87 y.o F w/PMHx of severe dementia A& o X 2, walking with cane, diastolic CHF, DMII, HTN,Hx of stroke in 1990,  prior stable meningoma, B/L femoral stents in 2010, and CAD X1 S/P cardiac stent  in 2010 comes in to follow up \par \par Pt speak Lithuanian and accompany by her daughter Cordell Koroma: who is her aid which pt prefer to ac\par \par last time saw PCP was 05/2020 \par \par son Heather valdez as proxy \par \par pt was in [facility] hospitalized in 06/28/2020- 06/02/2020 due to AMS; she was test covid19 with negative results; per pt 's daughter, pt had CT head done and stated that he results was negative; pt was also evaluated by neuro during the hospitalization per daughter and was diagnosed with advanced dementia; \par report form [facility]: \par CT head 5/28/20:\par IMPRESSION:\par No acute intracranial pathology.\par No evidence for intracranial hemorrhage or mass.\par Stable appearing calcified meningioma, left parietal convexity.\par Prominence of the ventricles and sulci are consistent with age-appropriate\par volume loss. Mild microvascular ischemic disease within the hemispheric\par white matter is slightly increased from prior imaging.\par \par MRI brain with and without contrast, 12/13/17:\par IMPRESSION:\par 1. MINIMAL CHRONIC ISCHEMIC CHANGE, SUPRATENTORIAL WHITE MATTER.\par 2. INCIDENTAL 13 X 10 MM CALCIFIED MENINGIOMA, RIGHT POSTERIOR PARIETAL\par MEDIAL CONVEXITY.\par 2. NO ACUTE INFARCT, PARENCHYMAL MASS, HEMORRHAGE OR HYDROCEPHALUS.\par \par has underline dementia; daughter noticed that pt;s mental status change in 01/2020 and since then more  lethargy and worsening of memory loss; loss of appetite and weight loss\par \par pt is current saw Neuro and pending for MRI and labs\par \par Last TTE is 10/2019 showed mild to moderate mitral valve regurgitation, mild to moderate aortic regurgitation, severe dilate Lt atrium;   and grade II diastolic dysfunction\par \par \par has never had colonoscopy or mammo done before; \par \par has not saw CARDIO since  2010; Saw ophthal in 01/2020; saw podiatry in 07/2020( every 2 months)\par \par daughter act as home aid; need help with all ADLs and IADLs by daughter; \par \par \par interval HX: 08/01/2020:\par \par pt is at baseline; no complaints; \par daughter stopped the glimepiride and escitalopram; \par \par \par

## 2020-08-01 NOTE — HEALTH RISK ASSESSMENT
[One fall no injury in past year] : Patient reported one fall in the past year without injury [No] : In the past 12 months have you used drugs other than those required for medical reasons? No [0] : 2) Feeling down, depressed, or hopeless: Not at all (0) [Patient declined PAP Smear] : Patient declined PAP Smear [Patient declined bone density test] : Patient declined bone density test [Hepatitis C test offered] : Hepatitis C test offered [Patient declined colonoscopy] : Patient declined colonoscopy [HIV Test offered] : HIV Test offered [] : No [[id number]] : 0 [Change in mental status noted] : No change in mental status noted [Language] : denies difficulty with language [Behavior] : denies difficulty with behavior [Handling Complex Tasks] : denies difficulty handling complex tasks [Reasoning] : denies difficulty with reasoning [Spatial Ability and Orientation] : denies difficulty with spatial ability and orientation [de-identified] : nend help for all er adls

## 2020-08-01 NOTE — REVIEW OF SYSTEMS
[Confusion] : confusion [Memory Loss] : memory loss [Fever] : no fever [Chills] : no chills [Discharge] : no discharge [Pain] : no pain [Earache] : no earache [Hearing Loss] : no hearing loss [Nasal Discharge] : no nasal discharge [Chest Pain] : no chest pain [Palpitations] : no palpitations [Lower Ext Edema] : no lower extremity edema [Orthopnea] : no orthopnea [Shortness Of Breath] : no shortness of breath [Wheezing] : no wheezing [Cough] : no cough [Abdominal Pain] : no abdominal pain [Nausea] : no nausea [Constipation] : no constipation [Diarrhea] : diarrhea [Vomiting] : no vomiting [Heartburn] : no heartburn [Dysuria] : no dysuria [Incontinence] : no incontinence [Hematuria] : no hematuria [Joint Pain] : no joint pain [Joint Stiffness] : no joint stiffness [Muscle Pain] : no muscle pain [Headache] : no headache [Dizziness] : no dizziness [Fainting] : no fainting [Unsteady Walking] : no ataxia [Suicidal] : not suicidal [Insomnia] : no insomnia [Anxiety] : no anxiety [Depression] : no depression

## 2020-08-24 PROBLEM — R26.81 UNSTEADY GAIT: Status: ACTIVE | Noted: 2020-01-01

## 2020-09-04 PROBLEM — D32.9 MENINGIOMA: Status: ACTIVE | Noted: 2020-01-01

## 2020-09-04 NOTE — DATA REVIEWED
[de-identified] : unchanged right posterior parietal convexity meningioma\par chronic left cerebellar infract [de-identified] : labs for reversible causes of dementia unremarkable\par Dr. Heard's note appreciated

## 2020-09-04 NOTE — ASSESSMENT
[FreeTextEntry1] : unchanged right posterior parietal convexity meningioma\par chronic left cerebellar infract, will cw asa 81 and restart Lipitor at 40mg.  Will check LDL and HbA1C.\par Alzheimers dementia\par Patient feels sleepy and lacks interest,  there is possibility of underlying dementia and will refer patient to psych for eval.  Will decrease Memantine from 28mg to 10mg bid.  She was previously on antidepressants for unknown reason.  Will consider adding Aricept at next visit.

## 2020-09-04 NOTE — PHYSICAL EXAM
[General Appearance - Alert] : alert [General Appearance - In No Acute Distress] : in no acute distress [Person] : oriented to person [Cranial Nerves Optic (II)] : visual acuity intact bilaterally,  visual fields full to confrontation, pupils equal round and reactive to light [Cranial Nerves Trigeminal (V)] : facial sensation intact symmetrically [Cranial Nerves Oculomotor (III)] : extraocular motion intact [Cranial Nerves Facial (VII)] : face symmetrical [Motor Tone] : muscle tone was normal in all four extremities [Sensation Tactile Decrease] : light touch was intact [Involuntary Movements] : no involuntary movements were seen [Place] : disoriented to place [Time] : disoriented to time [Visual Intact] : visual attention was ~T ~L decreased [Naming Objects] : difficulty naming common objects [Repeating Phrases] : difficulty repeating a phrase [Current Events] : inadequate knowledge of current events [Fluency] : fluency not intact [FreeTextEntry6] : moves bl arms and legs [FreeTextEntry8] : Wheelchair bound

## 2020-10-22 NOTE — PROGRESS NOTE ADULT - SUBJECTIVE AND OBJECTIVE BOX
10/22/2020 12:19 PM EMR reviewed. Pt on contact precautions for COVID rule out. CM called into pt's room in attempt to complete initial assessment. Pt requested CM call her son, Susan Bruno to complete assessment. CM called pt's son, Susan Bruno at [phone], had to [facility]. CM will follow up. Noted PT eval recommending home health.   ANY DemarcoW PGY 1 Note discussed with supervising resident and primary attending    Patient is a 86y old  Female who presents with a chief complaint of chest pain (05 Oct 2019 10:26)      INTERVAL HPI/OVERNIGHT EVENTS:   Reports significant L hip and knee pain this am.   Ortho consulted - pending xrays  Pending CXR to check for interval change  DC planning pending ortho.     MEDICATIONS  (STANDING):  amitriptyline 25 milliGRAM(s) Oral at bedtime  amLODIPine   Tablet 10 milliGRAM(s) Oral daily  aspirin  chewable 81 milliGRAM(s) Oral daily  atorvastatin 80 milliGRAM(s) Oral daily  azithromycin  IVPB 500 milliGRAM(s) IV Intermittent every 24 hours  cefTRIAXone   IVPB 1000 milliGRAM(s) IV Intermittent every 24 hours  enoxaparin Injectable 40 milliGRAM(s) SubCutaneous daily  fenofibrate Tablet 145 milliGRAM(s) Oral daily  furosemide   Injectable 20 milliGRAM(s) IV Push two times a day  hydrALAZINE 50 milliGRAM(s) Oral three times a day  insulin lispro (HumaLOG) corrective regimen sliding scale   SubCutaneous three times a day before meals  memantine 10 milliGRAM(s) Oral at bedtime  metoprolol tartrate 25 milliGRAM(s) Oral two times a day  sodium chloride 1 Gram(s) Oral two times a day    MEDICATIONS  (PRN):  acetaminophen   Tablet .. 650 milliGRAM(s) Oral every 6 hours PRN Moderate Pain (4 - 6)      __________________________________________________  REVIEW OF SYSTEMS:    CONSTITUTIONAL: No fever,   EYES: no acute visual disturbances  NECK: No pain or stiffness  RESPIRATORY: No cough; No shortness of breath  CARDIOVASCULAR: MILD CHEST DISCOMFORT. IMPROVED SINCE ADMISSION  GASTROINTESTINAL: No pain. No nausea or vomiting; No diarrhea   NEUROLOGICAL: No headache or numbness, no tremors  MUSCULOSKELETAL: L hip/knee pain. no muscle pain  GENITOURINARY: no dysuria, no frequency, no hesitancy  PSYCHIATRY: no depression , no anxiety  ALL OTHER  ROS negative        Vital Signs Last 24 Hrs  T(C): 36.8 (07 Oct 2019 05:32), Max: 37.2 (06 Oct 2019 20:31)  T(F): 98.3 (07 Oct 2019 05:32), Max: 99 (06 Oct 2019 20:31)  HR: 75 (07 Oct 2019 05:32) (66 - 78)  BP: 145/50 (07 Oct 2019 05:32) (123/49 - 152/47)  BP(mean): --  RR: 18 (07 Oct 2019 05:32) (18 - 18)  SpO2: 99% (07 Oct 2019 05:32) (96% - 99%)  ________________________________________________  PHYSICAL EXAM:  GENERAL: NAD  HEENT: Normocephalic;  conjunctivae and sclerae clear; moist mucous membranes;   NECK : supple  CHEST/LUNG: Clear to auscultation bilaterally with good air entry   HEART: S1 S2  regular; no murmurs, gallops or rubs  ABDOMEN: Soft, Nontender, Nondistended; Bowel sounds present  EXTREMITIES: L hip/knee exam limited by pain. no cyanosis; no edema; no calf tenderness  SKIN: warm and dry; no rash  NERVOUS SYSTEM:  Awake and alert; Oriented  to place, person and time ; no new deficits    _________________________________________________  LABS:                        10.7   5.65  )-----------( 242      ( 05 Oct 2019 07:42 )             32.9     10-05    129<L>  |  96  |  28<H>  ----------------------------<  169<H>  4.4   |  25  |  0.95    Ca    9.1      05 Oct 2019 07:42  Phos  3.5     10-05  Mg     1.9     10-05    TPro  6.6  /  Alb  3.2<L>  /  TBili  0.3  /  DBili  x   /  AST  20  /  ALT  21  /  AlkPhos  47  10-04        CAPILLARY BLOOD GLUCOSE      POCT Blood Glucose.: 166 mg/dL (05 Oct 2019 11:28)  POCT Blood Glucose.: 222 mg/dL (05 Oct 2019 07:30)  POCT Blood Glucose.: 206 mg/dL (04 Oct 2019 20:59)  POCT Blood Glucose.: 152 mg/dL (04 Oct 2019 16:35)  < from: Xray Chest 1 View- PORTABLE-Routine (10.04.19 @ 13:49) >  Findings:  Lines: None    Heart/Mediastinum/Lungs: Cardiomegaly. Improved pulmonary vascular   congestion. Small bilateral pleural effusions. Left basilar airspace   disease.    < end of copied text >        RADIOLOGY & ADDITIONAL TESTS:  < from: Transthoracic Echocardiogram (10.03.19 @ 08:53) >  CONCLUSIONS:  1. Mild posterior mitral annular calcification. Mild to  moderate mitral regurgitation.  2. Calcified trileaflet aortic valve with normal opening.  No aortic stenosis. Mild to moderate aortic regurgitation  ( msec).  3. Normal aortic root.  4. Severely dilated left atrium.  LA volume index = 54  cc/m2.  5. Normal left ventricular internal dimensions and wall  thicknesses.  6. Normal Left Ventricular Systolic Function,  (EF = 68% by  biplane)  7. Grade II diastolic dysfunction.  8. Normal right atrium.  9. Normal right ventricular size and systolic function  (TAPSE 2.1 cm).  10. RV systolic pressure is moderately increased at  46 mm  Hg.  11. Normal tricuspid valve. Moderate tricuspid  regurgitation.  12. Normal pulmonic valve. Trace pulmonic insufficiency is  noted.    < end of copied text >      Imaging Personally Reviewed:  YES/NO    Consultant(s) Notes Reviewed:   YES/ No    Care Discussed with Consultants :     Plan of care was discussed with patient and /or primary care giver; all questions and concerns were addressed and care was aligned with patient's wishes.

## 2020-12-11 NOTE — ED ADULT NURSE NOTE - CAS TRG GENERAL AIRWAY, MLM
HISTORY OF PRESENT ILLNESS  Roland Fernandez is a 80 y.o. male. Patient presents for chronic care. HPI  Pt is ambulating with a cane  Pt is with a Care Giver. Pt has a swollen eyelid  He has an appt with an eye doctor in May. Review of Systems   Constitutional: Negative. HENT: Negative. Eyes: Negative. Respiratory: Negative. Cardiovascular: Negative. Visit Vitals  /75 (BP 1 Location: Left arm)   Pulse 81   Temp 97.4 °F (36.3 °C) (Oral)   Resp 18   Ht 5' 7.6\" (1.717 m)   Wt 214 lb (97.1 kg)   BMI 32.93 kg/m²       Physical Exam  Constitutional:       General: He is not in acute distress. Appearance: Normal appearance. He is not ill-appearing. HENT:      Right Ear: Tympanic membrane normal.      Left Ear: Tympanic membrane normal.      Nose: Nose normal.   Eyes:      General:         Left eye: Hordeolum present. Extraocular Movements: Extraocular movements intact. Cardiovascular:      Rate and Rhythm: Normal rate and regular rhythm. Heart sounds: No murmur. Pulmonary:      Effort: Pulmonary effort is normal. No respiratory distress. Breath sounds: Normal breath sounds. No stridor. No wheezing or rhonchi. Neurological:      Mental Status: He is alert and oriented to person, place, and time. ASSESSMENT and PLAN    ICD-10-CM ICD-9-CM    1. Other iron deficiency anemia D50.8 280.8 HGB & HCT   2. Mixed hyperlipidemia E78.2 272.2 atorvastatin (LIPITOR) 20 mg tablet      LIPID PANEL   3. Pulmonary emphysema, unspecified emphysema type ([address] J43.9 492.8 budesonide-formoteroL (SYMBICORT) 80-4.5 mcg/actuation HFAA   4. Benign prostatic hyperplasia with nocturia N40.1 600.01 tamsulosin (FLOMAX) 0.4 mg capsule    R35.1 788.43    5. Essential hypertension I10 401.9 losartan (COZAAR) 25 mg tablet      METABOLIC PANEL, COMPREHENSIVE   6.  Hordeolum externum left upper eyelid H00.014 373.11 gentamicin (GARAMYCIN) 0.3 % ophthalmic solution     PLAN:  Warm compresses to left upper eye lid. Pt is to call if stye persist or worsens. I have discussed the diagnosis with the patient and the intended plan as seen in the above orders. The patient has received an after-visit summary and questions were answered concerning future plans. I have discussed medication side effects and warnings with the patient as well. Patient will call for further questions. Follow-up and Dispositions    · Return in about 3 months (around 4/17/2020) for chronic care. Patent

## 2020-12-11 NOTE — H&P ADULT - NSHPPHYSICALEXAM_GEN_ALL_CORE
General - NAD, sitting up in bed, elderly, frail, no respiratory distress. AAOx1  Cardiovascular - +s1/s2 + murmur  Lungs - Clear to ascultation, no use of accessory muscles, speaking complete sentences, diminished breath sounds, mild rales  Skin - No rashes, skin warm and dry, no erythematous areas  Abdomen - Normal bowel sounds, abdomen soft and nontender  Extremities - No edema, cyanosis or clubbing  Neurological – Alert and oriented x 3, CN 2-12 grossly intact.

## 2020-12-11 NOTE — H&P ADULT - ASSESSMENT
Patient is an 88 year old female with PMHx of dementia, HTN, DM, CHFpEF, presenting with shortness of breath. WIth findings of pulmonary congestion, BNP and trop elevation, D-dimer elevation, will rule out PE and admit for CHF exacerbation

## 2020-12-11 NOTE — H&P ADULT - HISTORY OF PRESENT ILLNESS
Patient is an 88 year old female from home, with daughter as HHA, with PMHx of HTN, DM, CHFpEF, dementia, presenting with chief complaint of shortness of breath. Due to patient's dementia, is a poor historian. Attempted to obtain history through , however patient has poor memory, does not remember why she is in hospital. Patient is able to endorse shortness of breath, abdominal pain, and pleuritic chest pain. Contacted patient's daughter, Cordell Lux, who is also HHA. Around Patient is an 88 year old female from home, with daughter as HHA, with PMHx of HTN, DM, CHFpEF, dementia, presenting with chief complaint of shortness of breath. Due to patient's dementia, is a poor historian. Attempted to obtain history through , however patient has poor memory, does not remember why she is in hospital. Patient is able to endorse shortness of breath, abdominal pain, and pleuritic chest pain. Contacted patient's daughter, Cordell Lux, who is also HHA. Around 2 AM, patient woke up acutely short of breath. As per patient's daughter, patient's SOB was worsened laying down and after sitting up for 15 minutes improved. Patient also noted to be pale and diaphoretic. Patient was in her usual state of health prior to this event. Patient has had no fever, chills, coughing, chest pain, nausea, vomiting, diarrhea. Patient does have dementia with waxing and waning lucidity and behavioral disturbance. Patient is an 88 year old female from home, with daughter as HHA, with PMHx of HTN, DM, CHFpEF, dementia, presenting with chief complaint of shortness of breath. Due to patient's dementia, is a poor historian. Attempted to obtain history through , however patient has poor memory, does not remember why she is in hospital. Patient is able to endorse shortness of breath, abdominal pain, and pleuritic chest pain. Contacted patient's daughter, Cordell Lux, who is also HHA. Around 2 AM, patient woke up acutely short of breath. As per patient's daughter, patient's SOB was worsened laying down and after sitting up for 15 minutes improved. Patient also noted to be pale and diaphoretic. Patient was in her usual state of health prior to this event. Patient has had no fever, chills, coughing, chest pain, nausea, vomiting, diarrhea. Patient does have dementia with waxing and waning lucidity and behavioral disturbance.     In the ED, D-dimer elevated, CTangio chest was negative for PE. CXR with pulmonary edema, s/p 40mg IV Lasix

## 2020-12-11 NOTE — H&P ADULT - PROBLEM SELECTOR PLAN 6
IMPROVE VTE score: 4  Will manage with: SCDs given anemia    [ ] Previous VTE                                    3  [ ] Thrombophilia                                  2  [ x] Lower limb paralysis                        2  (unable to hold up >15 seconds)    [ ] Current Cancer (within 6 months)        2   [x] Immobilization > 24 hrs                    1  [ ] ICU/CCU stay > 24 hrs                      1  [x] Age > 60                                         1

## 2020-12-11 NOTE — ED PROVIDER NOTE - PROGRESS NOTE DETAILS
campos: trop elevated 2. D dimer 2660, h/h 7.8, BNP 20020- pending cta to r/o pe- pt stable, no resp distress  s/o to Dr Cunha to obtain ct and admit. working dx chf exacerbation vs pe

## 2020-12-11 NOTE — ED ADULT NURSE NOTE - OBJECTIVE STATEMENT
Patient brought in by EMS with shortness of breath. Patient states she speaks Arbanian, but she was unable to understand the  when she spoken to.

## 2020-12-11 NOTE — CONSULT NOTE ADULT - SUBJECTIVE AND OBJECTIVE BOX
CHIEF COMPLAINT:Patient is a 88y old  Female who presents with a chief complaint of Shortness of Breath (11 Dec 2020 09:31)      HPI:  Patient is an 88 year old female from home, with daughter as HHA, with PMHx of HTN, DM, CHFpEF, dementia, presenting with chief complaint of shortness of breath. Due to patient's dementia, is a poor historian. Attempted to obtain history through , however patient has poor memory, does not remember why she is in hospital. Patient is able to endorse shortness of breath, abdominal pain, and pleuritic chest pain. Contacted patient's daughter, Cordell Lux, who is also HHA. Around 2 AM, patient woke up acutely short of breath. As per patient's daughter, patient's SOB was worsened laying down and after sitting up for 15 minutes improved. Patient also noted to be pale and diaphoretic. Patient was in her usual state of health prior to this event. Patient has had no fever, chills, coughing, chest pain, nausea, vomiting, diarrhea. Patient does have dementia with waxing and waning lucidity and behavioral disturbance.  (11 Dec 2020 09:31)      PAST MEDICAL & SURGICAL HISTORY:  (HFpEF) heart failure with preserved ejection fraction    TIA (transient ischemic attack)    Myositis  on prednisone x 5 years    Osteoarthritis    HTN (hypertension)    Diabetes      MEDICATIONS  (STANDING):  atorvastatin 40 milliGRAM(s) Oral at bedtime  calcium carbonate 1250 mG  + Vitamin D (OsCal 500 + D) 1 Tablet(s) Oral daily  donepezil 5 milliGRAM(s) Oral at bedtime  insulin lispro (ADMELOG) corrective regimen sliding scale   SubCutaneous three times a day before meals  memantine 10 milliGRAM(s) Oral daily  metoprolol tartrate 50 milliGRAM(s) Oral two times a day  senna 2 Tablet(s) Oral at bedtime    MEDICATIONS  (PRN):      FAMILY HISTORY:  No pertinent family history in first degree relatives        SOCIAL HISTORY:  unable to obtain    Allergies    No Known Allergies    Intolerances    	    REVIEW OF SYSTEMS:  [ x] Unable to obtain    PHYSICAL EXAM:  T(C): 35.8 (12-11-20 @ 11:00), Max: 37 (12-11-20 @ 07:00)  HR: 81 (12-11-20 @ 11:00) (81 - 93)  BP: 168/65 (12-11-20 @ 11:00) (163/81 - 178/67)  RR: 17 (12-11-20 @ 11:00) (17 - 24)  SpO2: 100% (12-11-20 @ 11:00) (96% - 100%)  Wt(kg): --  I&O's Summary      Appearance: Normal	  HEENT:   Normal oral mucosa, PERRL, EOMI	  Lymphatic: No lymphadenopathy  Cardiovascular: Normal S1 S2, No JVD, No murmurs, No edema  Respiratory: Lungs clear to auscultation	  Gastrointestinal:  Soft, Non-tender, + BS	  Skin: No rashes, No ecchymoses, No cyanosis	  Extremities: Normal range of motion, No clubbing, cyanosis or edema  Vascular: Peripheral pulses palpable 2+ bilaterally    	    ECG:  	nsr with no acute st-t changes  	  	  LABS:	 	      CARDIAC MARKERS ( 11 Dec 2020 03:43 )  2.430 ng/mL / x     / x     / x     / x                                  7.8    9.25  )-----------( 244      ( 11 Dec 2020 03:45 )             27.2     12-11    135  |  104  |  21<H>  ----------------------------<  240<H>  4.4   |  22  |  0.65    Ca    8.5      11 Dec 2020 03:43  Mg     1.6     12-11    TPro  6.1  /  Alb  1.9<L>  /  TBili  0.4  /  DBili  x   /  AST  37  /  ALT  31  /  AlkPhos  363<H>  12-11    proBNP: Serum Pro-Brain Natriuretic Peptide: 69100 pg/mL (12-11 @ 03:43)    c< from: CT Abdomen and Pelvis w/ IV Cont (12.11.20 @ 09:58) >  EXAM:  CT ABDOMEN AND PELVIS IC                          EXAM:  CT ANGIO CHEST (W)AW IC                            PROCEDURE DATE:  12/11/2020          INTERPRETATION:  CLINICAL INDICATION: 88 years  Female with r/o pe.    COMPARISON: Chest CT 2/22/2015    PROCEDURE:  CT Angiography of the Chest was performed followed by portal venous phase imaging of the Abdomen and Pelvis.  IV Contrast: 90 ml of Omnipaque 350 was injected intravenously. 10 ml were discarded.  Oral contrast: None.  Sagittal and coronal reformats were performed as well as 3D (MIP) reconstructions.    LIMITATION: Absence of enteric contrast limits evaluation of the GI tract.    FINDINGS:  CHEST:  LUNGS AND LARGE AIRWAYS: Patent central airways. Bilateral lower lobe compressive atelectasis. Diffuse interlobular septal thickening. Right upper lobe groundglass opacities. 2.2 x 1.3 cm pleural-based lingular consolidation (65:80). Perivascular soft tissue surrounding the right lower lobe pulmonary veins.  PLEURA: Moderate bilateral pleural effusions.  VESSELS: No evidence of pulmonary embolism. 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 Descending thoracic aorta 2.3 cm. No aortic dissection.  HEART: Moderate cardiomegaly. Mitral annular calcification.No pericardial effusion.  MEDIASTINUM AND LETY: Right hilar adenopathy measuring up to 1.9 cm in short axis. Left hilar adenopathy measuring up to 1.1 cm in short axis. Right lower paratracheal lymph node measuring 1.3 cm in short axis with central calcification. Prevascular lymph nodes measuring up to 1.2 cm in short axis.  CHEST WALL AND LOWER NECK: Within normal limits.    ABDOMEN AND PELVIS:  LIVER: Within normal limits.  BILE DUCTS: Normal caliber.  GALLBLADDER: Trace pericholecystic fluid.  SPLEEN: Within normal limits.  PANCREAS: Within normal limits.  ADRENALS: Within normal limits.  KIDNEYS/URETERS: Bilateral renal hypodensities too small to characterize. 1.8 cm right lower pole renal cyst. Right upper pole renal cortical scarring.    BLADDER: Incompletely distended bladder with diffuse wall thickening.  REPRODUCTIVE ORGANS: Mildly enlarged multi fibroid uterus.    BOWEL: No bowel obstruction. Appendix is not visualized and cannot be assessed.  PERITONEUM: No ascites.  VESSELS: Atherosclerotic aorta without aneurysm.  RETROPERITONEUM/LYMPH NODES: Prominent perigastric lymph nodes measuring up to 8 mm in short axis. No other lymphadenopathy.  ABDOMINAL WALL: Small fat-containing umbilical hernia.  BONES: Mild degenerative changes. Grade 1 L4-5 anterolisthesis    IMPRESSION:    Absence of enteric contrast limits evaluation of the GI tract.    No evidence of pulmonary embolism.    Moderate bilateral pleural effusions with underlying compressive atelectasis lower lobes. Mild pulmonary edema. Right upper lobe groundglass opacity may be on the basis of pulmonary edema or pneumonia.    Soft tissue extending along the right lower lobe pulmonary veins may represent infiltrate or tumor.    Mediastinal and hilar adenopathy secondary to neoplasm or infection.    Cardiomegaly. Ectatic ascending aorta at 3.2 cm.    Trace pericholecystic fluid. Recommend abdominal ultrasound to evaluate for acute cholecystitis.    Nonspecific prominent perigastric lymph nodes.              NORBERTO PERALTA; Attending Radiologist      EXAM:  CT BRAIN                            PROCEDURE DATE:  12/11/2020          INTERPRETATION:  HISTORY: Altered mental status    Comparison 08/19/18    Evaluation demonstrates no evidence of mass-effect or midline shift. No intraparenchymal mass lesions or hemorrhage is identified. There is mild left medial temporal lobe atrophy. There is no evidence of hydrocephalus. No extra-axial collections are noted.    The bone windows demonstrate no gross osseous abnormalities. Incidental note is made of a tiny right parietal convexity densely calcified meningioma.    Impression:  1. No acute findings.

## 2020-12-11 NOTE — ED PROVIDER NOTE - CLINICAL SUMMARY MEDICAL DECISION MAKING FREE TEXT BOX
Appropriate/Calm 87 y/o female w/ PMH of dementia, HFpEF, TIA, HTN, DM, and osteoarthritis presents to ed c/o sob per triage and pt.  pt states she speaks Algerian but using pacific  and doesn't understand.    dyspnea chf exacerbation vs pe vs acs vs anemia vs covid- labs, cxr, ekg, ct, admit

## 2020-12-11 NOTE — ED PROVIDER NOTE - OBJECTIVE STATEMENT
85 y/o female w/ PMH of dementia, HFpEF, TIA, HTN, DM, and osteoarthritis presents to ed c/o sob per triage and pt.  pt states she speaks [place] but using pacific  and doesn't understand.

## 2020-12-11 NOTE — ED ADULT NURSE NOTE - NSIMPLEMENTINTERV_GEN_ALL_ED
Implemented All Fall with Harm Risk Interventions:  [place] to call system. Call bell, personal items and telephone within reach. Instruct patient to call for assistance. Room bathroom lighting operational. Non-slip footwear when patient is off stretcher. Physically safe environment: no spills, clutter or unnecessary equipment. Stretcher in lowest position, wheels locked, appropriate side rails in place. Provide visual cue, wrist band, yellow gown, etc. Monitor gait and stability. Monitor for mental status changes and reorient to person, place, and time. Review medications for side effects contributing to fall risk. Reinforce activity limits and safety measures with patient and family. Provide visual clues: red socks.

## 2020-12-11 NOTE — H&P ADULT - CONVERSATION DETAILS
Spoke to daughter through the phone in the AM of admission, Cordell Lux. Patient had endorsed wanting patient to be comfortable IF patient were to deteriorate any further. Spoke to Cordell while she was visiting patient. Although she would like patient to live for another 5 years, endorsed that in the event of death, would like patient to pass comfortably. Patient's HCP is Cordell's son, Sha Lux. Sha was called and patient's condition was explained through speaker phone. Also informed Sha regarding [facility] conversation with Cordell. Sha is agreeable for DNR/DNI, MOLST form signed.

## 2020-12-11 NOTE — H&P ADULT - PROBLEM SELECTOR PLAN 1
CXR with pulmonary edema, s/p Lasix 40mg IV in ER  titrate off supplemental oxygen as patient tolerates  repeat echo  Elevated Troponin 1, likely demand ischemia, troponin 2 resolved.  ECG NSR  Cardio, Dr. Montes  Started diuresis with Lasix 20mg IV qdaily

## 2020-12-11 NOTE — H&P ADULT - PROBLEM SELECTOR PLAN 3
Hold home Janumet  Started sliding scale, monitor insulin requirements and adjust as necessary   Follow up hgb a1c

## 2020-12-12 NOTE — PROGRESS NOTE ADULT - ASSESSMENT
88 year old female from home, with daughter as HHA, with PMHx of HTN, DM, CHFpEF, dementia, presenting with chief complaint of shortness of breath,pulmonary edema-acute diastolic HF,anemia,Type II MI due to demand ischemia..  1.Tele monitoring.  2.Echocardiogram.  3.Anemia-Fe profile,occult,SPEP.  4.Add asa, cont b blocker.  5.Acute diastolic HF-lasix 20mg IV qd.  6.DM-Insulin.  7.HTN-b blocker.  8.Dementia-aricept,namenda.

## 2020-12-12 NOTE — PROGRESS NOTE ADULT - SUBJECTIVE AND OBJECTIVE BOX
CHIEF COMPLAINT:Patient is a 88y old  Female who presents with a chief complaint of Shortness of Breath.Pt appears comfortable.    	  REVIEW OF SYSTEMS:  unable to obtain      PHYSICAL EXAM:  T(C): 37.2 (12-12-20 @ 05:23), Max: 37.2 (12-12-20 @ 05:23)  HR: 92 (12-12-20 @ 10:06) (69 - 93)  BP: 141/46 (12-12-20 @ 10:06) (137/62 - 172/62)  RR: 18 (12-12-20 @ 10:06) (17 - 18)  SpO2: 99% (12-12-20 @ 10:06) (98% - 100%)  Wt(kg): --  I&O's Summary      Appearance: Normal	  HEENT:   Normal oral mucosa, PERRL, EOMI	  Lymphatic: No lymphadenopathy  Cardiovascular: Normal S1 S2, No JVD, No murmurs, No edema  Respiratory: Lungs clear to auscultation	  Gastrointestinal:  Soft, Non-tender, + BS	  Skin: No rashes, No ecchymoses, No cyanosis	  Extremities: Normal range of motion, No clubbing, cyanosis or edema  Vascular: Peripheral pulses palpable 2+ bilaterally    MEDICATIONS  (STANDING):  aspirin  chewable 81 milliGRAM(s) Oral daily  atorvastatin 40 milliGRAM(s) Oral at bedtime  calcium carbonate 1250 mG  + Vitamin D (OsCal 500 + D) 1 Tablet(s) Oral daily  donepezil 5 milliGRAM(s) Oral at bedtime  furosemide   Injectable 20 milliGRAM(s) IV Push daily  insulin lispro (ADMELOG) corrective regimen sliding scale   SubCutaneous three times a day before meals  memantine 10 milliGRAM(s) Oral daily  metoprolol tartrate 50 milliGRAM(s) Oral two times a day  senna 2 Tablet(s) Oral at bedtime      TELEMETRY: nsr,pvc's	    	  	  LABS:	 	      CARDIAC MARKERS ( 12 Dec 2020 07:21 )  0.728 ng/mL / x     / x     / x     / x      CARDIAC MARKERS ( 11 Dec 2020 21:08 )  1.120 ng/mL / x     / x     / x     / x      CARDIAC MARKERS ( 11 Dec 2020 12:32 )  1.890 ng/mL / x     / 27 U/L / x     / 4.2 ng/mL  CARDIAC MARKERS ( 11 Dec 2020 03:43 )  2.430 ng/mL / x     / x     / x     / x                                    7.7    7.34  )-----------( 229      ( 12 Dec 2020 07:21 )             26.2     12-12    135  |  100  |  21<H>  ----------------------------<  145<H>  3.8   |  27  |  0.57    Ca    8.8      12 Dec 2020 07:21  Phos  3.7     12-12  Mg     1.3     12-12    TPro  6.1  /  Alb  1.9<L>  /  TBili  0.4  /  DBili  x   /  AST  37  /  ALT  31  /  AlkPhos  363<H>  12-11    proBNP: Serum Pro-Brain Natriuretic Peptide: 56898 pg/mL (12-11 @ 03:43)      TSH: Thyroid Stimulating Hormone, Serum: 3.71 uU/mL (12-12 @ 07:21)

## 2020-12-12 NOTE — PROGRESS NOTE ADULT - SUBJECTIVE AND OBJECTIVE BOX
PA form, lab order, and office note faxed to [facility]. Marked Urgent so 72 hour turn around time.  Boris moore.    INTERVAL HPI/OVERNIGHT EVENTS:  Patient seen at [facility],events noticed  VITAL SIGNS:  T(F): 98.9 (20 @ 05:23)  HR: 92 (20 @ 10:06)  BP: 141/46 (20 @ 10:06)  RR: 18 (20 @ 10:06)  SpO2: 99% (20 @ 10:06)  Wt(kg): --    PHYSICAL EXAM:  awake,poor historian  Constitutional:  Eyes:  ENMT:perrla  Neck:  Respiratory:few rales,cracles  Cardiovascular:s1ss2,,m-none  Gastrointestinal:soft,bs pos  Extremities:mild edema  Vascular:  Neurological:no focal deficit  Musculoskeletal:    MEDICATIONS  (STANDING):  aspirin  chewable 81 milliGRAM(s) Oral daily  atorvastatin 40 milliGRAM(s) Oral at bedtime  calcium carbonate 1250 mG  + Vitamin D (OsCal 500 + D) 1 Tablet(s) Oral daily  donepezil 5 milliGRAM(s) Oral at bedtime  furosemide   Injectable 20 milliGRAM(s) IV Push daily  insulin lispro (ADMELOG) corrective regimen sliding scale   SubCutaneous three times a day before meals  magnesium oxide 400 milliGRAM(s) Oral once  memantine 10 milliGRAM(s) Oral daily  metoprolol tartrate 50 milliGRAM(s) Oral two times a day  senna 2 Tablet(s) Oral at bedtime    MEDICATIONS  (PRN):      Allergies    No Known Allergies    Intolerances        LABS:                        7.7    7.34  )-----------( 229      ( 12 Dec 2020 07:21 )             26.2     12-    135  |  100  |  21<H>  ----------------------------<  145<H>  3.8   |  27  |  0.57    Ca    8.8      12 Dec 2020 07:21  Phos  3.7     12-12  Mg     1.3     12-12    TPro  6.1  /  Alb  1.9<L>  /  TBili  0.4  /  DBili  x   /  AST  37  /  ALT  31  /  AlkPhos  363<H>  12-11      Urinalysis Basic - ( 11 Dec 2020 08:17 )    Color: Yellow / Appearance: Clear / S.010 / pH: x  Gluc: x / Ketone: Negative  / Bili: Negative / Urobili: Negative   Blood: x / Protein: 15 / Nitrite: Negative   Leuk Esterase: Negative / RBC: 0-2 /HPF / WBC 0-2 /HPF   Sq Epi: x / Non Sq Epi: x / Bacteria: Trace /HPF      Assessment and Plan:    Assessment:  · Assessment	  Patient is an 88 year old female with PMHx of dementia, HTN, DM, CHFpEF, presenting with shortness of breath. WIth findings of pulmonary congestion, BNP and trop elevation, D-dimer elevation, will rule out PE and admit for CHF exacerbation     Problem/Plan - 1:  ·  Problem: Acute on chronic diastolic (congestive) heart failure  titrate off supplemental oxygen as patient tolerates  repeat echo-pending  ECG NSR  Cardio, Dr. Montes  Started diuresis with Lasix 20mg IV qdaily.      Problem/Plan - 2:  ·  Problem: HTN (hypertension).  Plan: Continue on metoprolol  Hydralazine and amlodipine held for now. Continue monitoring BP.      Problem/Plan - 3:  ·  Problem: Diabetes.  Plan: Hold home Janumet  Started sliding scale, monitor insulin requirements and adjust as necessary   Follow up hgb a1c.      Problem/Plan - 4:  ·  Problem: Dementia.  Plan: Supportive care  Continue donepezil and memantine.      Problem/Plan - 5:  ·  Problem: Iron deficiency anemia.  Plan: Hemoglobin decreased from baseline ~10  Consider iron supplements after workup with SPEP, FOBT, CEA.      Problem/Plan - 6:  Problem: Need for prophylactic measure. Plan: IMPROVE VTE score: 4  Will manage with: SCDs given anemia    [ ] Previous VTE                                    3  [ ] Thrombophilia                                  2  [ x] Lower limb paralysis                        2  (unable to hold up >15 seconds)    [ ] Current Cancer (within 6 months)        2   [x] Immobilization > 24 hrs                    1  [ ] ICU/CCU stay > 24 hrs                      1  [x] Age > 60                                         1.

## 2020-12-13 NOTE — PHYSICAL THERAPY INITIAL EVALUATION ADULT - MANUAL MUSCLE TESTING RESULTS, REHAB EVAL
Unable to fully assess due to current cognitive impairments. On observation, 3/5 throughout except L shoulder flexion: 3-/5/grossly assessed due to

## 2020-12-13 NOTE — PROGRESS NOTE ADULT - SUBJECTIVE AND OBJECTIVE BOX
INTERVAL HPI/OVERNIGHT EVENTS:  Patient seen at [facility],awake,confused  VITAL SIGNS:  T(F): 97.3 (12-13-20 @ 05:10)  HR: 91 (12-13-20 @ 05:10)  BP: 160/48 (12-13-20 @ 05:10)  RR: 17 (12-13-20 @ 05:10)  SpO2: 95% (12-13-20 @ 05:10)  Wt(kg): --    PHYSICAL EXAM:  awake,no dystress  Constitutional:  Eyes:  ENMT:perrla  Neck:  Respiratory:few rales  Cardiovascular:s1s2,m-none  Gastrointestinal:soft,bs pos  Extremities:  Vascular:  Neurological:no focal deficit  Musculoskeletal:    MEDICATIONS  (STANDING):  aspirin  chewable 81 milliGRAM(s) Oral daily  atorvastatin 40 milliGRAM(s) Oral at bedtime  calcium carbonate 1250 mG  + Vitamin D (OsCal 500 + D) 1 Tablet(s) Oral daily  donepezil 5 milliGRAM(s) Oral at bedtime  furosemide   Injectable 20 milliGRAM(s) IV Push daily  insulin lispro (ADMELOG) corrective regimen sliding scale   SubCutaneous three times a day before meals  memantine 10 milliGRAM(s) Oral daily  metoprolol tartrate 50 milliGRAM(s) Oral two times a day  senna 2 Tablet(s) Oral at bedtime    MEDICATIONS  (PRN):      Allergies    No Known Allergies    Intolerances        LABS:                        7.7    6.73  )-----------( 235      ( 13 Dec 2020 07:20 )             26.8     12-13    134<L>  |  99  |  23<H>  ----------------------------<  150<H>  3.5   |  25  |  0.63    Ca    8.6      13 Dec 2020 07:20  Phos  3.7     12-12  Mg     1.3     12-12    TPro  5.4<L>  /  Alb  x   /  TBili  x   /  DBili  x   /  AST  x   /  ALT  x   /  AlkPhos  x   12-11    Assessment and Plan:    Assessment:  · Assessment	  Patient is an 88 year old female with PMHx of dementia, HTN, DM, CHFpEF, presenting with shortness of breath. WIth findings of pulmonary congestion, BNP and trop elevation, D-dimer elevation, will rule out PE and admit for CHF exacerbation     Problem/Plan - 1:  ·  Problem: Acute on chronic diastolic (congestive) heart failure  titrate off supplemental oxygen as patient tolerates  repeat echo-pending  Cardio, Dr. Montes  Started diuresis with Lasix 20mg IV qdaily.      Problem/Plan - 2:  ·  Problem: HTN (hypertension).  Plan: Continue on metoprolol  Hydralazine and amlodipine held for now. Continue monitoring BP.      Problem/Plan - 3:  ·  Problem: Diabetes.  Plan: Hold home Janumet  Started sliding scale, monitor insulin requirements and adjust as necessary   Follow up hgb a1c.      Problem/Plan - 4:  ·  Problem: Dementia.  Plan: Supportive care  Continue donepezil and memantine.      Problem/Plan - 5:  ·  Problem: Iron deficiency anemia.  Plan: Hemoglobin decreased from baseline ~10  Consider iron supplements after workup with SPEP, FOBT, CEA.      Problem/Plan - 6:  Problem: Need for prophylactic measure. Plan: IMPROVE VTE score: 4  Will manage with: SCDs given anemia    [ ] Previous VTE                                    3  [ ] Thrombophilia                                  2  [ x] Lower limb paralysis                        2  (unable to hold up >15 seconds)    [ ] Current Cancer (within 6 months)        2   [x] Immobilization > 24 hrs                    1  [ ] ICU/CCU stay > 24 hrs                      1  [x] Age > 60                                         1.

## 2020-12-13 NOTE — PHYSICAL THERAPY INITIAL EVALUATION ADULT - ACTIVE RANGE OF MOTION EXAMINATION, REHAB EVAL
Right UE Active ROM was WNL (within normal limits)/LLE Active ROM was WNL (within normal limits)/RLE Active ROM was WNL (within normal limits)/deficits as listed below/Left UE Active ROM was WNL (within normal limits)/L shoulder flexion limited to 0-150deg due to pain

## 2020-12-13 NOTE — PHYSICAL THERAPY INITIAL EVALUATION ADULT - GENERAL OBSERVATIONS, REHAB EVAL
Pt. found supine in bed, +IV, in NAD. Pt. A&Ox1, Tajik speaking but understand English, able to follow commands and agreeable to PT evaluation.

## 2020-12-13 NOTE — PHYSICAL THERAPY INITIAL EVALUATION ADULT - DISCHARGE DISPOSITION, PT EVAL
At this time, pt. would benefit from being discharged to a sub-acute rehabilitation facility to address current impairments. Recommendation subject to change pending patient improvement during follow-up sessions.

## 2020-12-13 NOTE — PHYSICAL THERAPY INITIAL EVALUATION ADULT - ADDITIONAL COMMENTS
As per daughter, pt. ambulated indoors only with RW and assistance. Pt. needed assistance performing all ADLs including getting out of bed and standing up from chair. Has [facility] Mon-Fri for 9 hours and on the Sat-Sun for 6 hours.

## 2020-12-13 NOTE — PHYSICAL THERAPY INITIAL EVALUATION ADULT - PASSIVE RANGE OF MOTION EXAMINATION, REHAB EVAL
L shoulder flexion PROM limited due to pain/no Passive ROM deficits were identified/deficits as listed below

## 2020-12-13 NOTE — PHYSICAL THERAPY INITIAL EVALUATION ADULT - CRITERIA FOR SKILLED THERAPEUTIC INTERVENTIONS
therapy frequency/anticipated discharge recommendation/functional limitations in following categories/risk reduction/prevention/rehab potential/predicted duration of therapy intervention/impairments found

## 2020-12-14 NOTE — PROGRESS NOTE ADULT - ASSESSMENT
Patient is an 88 year old female with PMHx of dementia, HTN, DM, CHFpEF, presenting with shortness of breath. WIth findings of pulmonary congestion, BNP and trop elevation, D-dimer elevation, will rule out PE and admit for CHF exacerbation Patient is an 88 year old female with PMHx of dementia, HTN, DM, CHFpEF, presenting with shortness of breath. WIth findings of pulmonary congestion, BNP and trop elevation, D-dimer elevation, CT negative for PE.  Pt. admitted to medicine for acute on chronic HF, followed by pastora Montes, on Lasix diuresis with good effect.  Pt. evaluated by PT, recommended [facility] however pt. and family decline K[facility]T and wish to discharge to home.  Pt.'s daughter is HHA.  Discharge to home pending ECHO results and possible further cardiac w/u.

## 2020-12-14 NOTE — PROGRESS NOTE ADULT - PROBLEM SELECTOR PLAN 1
-ECHO 10/3/2019 shows GIIDD, EF 68%  -f/u repeat ECHO   -CXR 12/11 shows pulmonary edema  -Card Dr. Montes  -Cont O2 N/C to maintain O2 sat>94%  -Cont Lasix diuresis  -Cont ASA, BB, statin

## 2020-12-14 NOTE — PROGRESS NOTE ADULT - PROBLEM SELECTOR PLAN 5
Pt. is emaciated Likely due to poor nutrition due to progression of disease  -Pt. is emaciated BMI 22.6, Albumin 1.9

## 2020-12-14 NOTE — PROGRESS NOTE ADULT - PROBLEM SELECTOR PLAN 3
Hold home Janumet  Started sliding scale, monitor insulin requirements and adjust as necessary   Follow up hgb a1c Hold home Janumet  -Cont FS AC&HS  -Cont sliding scale  -hgb a1c 6.8

## 2020-12-14 NOTE — PROGRESS NOTE ADULT - SUBJECTIVE AND OBJECTIVE BOX
NP Note discussed with  Primary Attending    Patient is a 88y old  Female who presents with a chief complaint of Shortness of Breath (14 Dec 2020 09:41)      INTERVAL HPI/OVERNIGHT EVENTS: no new complaints    MEDICATIONS  (STANDING):  aspirin  chewable 81 milliGRAM(s) Oral daily  atorvastatin 40 milliGRAM(s) Oral at bedtime  calcium carbonate 1250 mG  + Vitamin D (OsCal 500 + D) 1 Tablet(s) Oral daily  donepezil 5 milliGRAM(s) Oral at bedtime  furosemide    Tablet 20 milliGRAM(s) Oral daily  insulin lispro (ADMELOG) corrective regimen sliding scale   SubCutaneous three times a day before meals  memantine 10 milliGRAM(s) Oral daily  metoprolol tartrate 50 milliGRAM(s) Oral two times a day  senna 2 Tablet(s) Oral at bedtime    MEDICATIONS  (PRN):      __________________________________________________  REVIEW OF SYSTEMS:    CONSTITUTIONAL: No fever,   EYES: no acute visual disturbances  NECK: No pain or stiffness  RESPIRATORY: No cough; No shortness of breath  CARDIOVASCULAR: No chest pain, no palpitations  GASTROINTESTINAL: No pain. No nausea or vomiting; No diarrhea   NEUROLOGICAL: No headache or numbness, no tremors  MUSCULOSKELETAL: No joint pain, no muscle pain  GENITOURINARY: no dysuria, no frequency, no hesitancy  PSYCHIATRY: no depression , no anxiety  ALL OTHER  ROS negative        Vital Signs Last 24 Hrs  T(C): 36.4 (14 Dec 2020 13:32), Max: 37.1 (13 Dec 2020 21:21)  T(F): 97.6 (14 Dec 2020 13:32), Max: 98.7 (13 Dec 2020 21:21)  HR: 84 (14 Dec 2020 13:32) (75 - 94)  BP: 151/46 (14 Dec 2020 13:32) (134/62 - 160/71)  BP(mean): --  RR: 17 (14 Dec 2020 13:32) (16 - 18)  SpO2: 96% (14 Dec 2020 13:32) (96% - 100%)    ________________________________________________  PHYSICAL EXAM:  Emaciated, well groomed  GENERAL: NAD  HEENT: Normocephalic;  conjunctivae and sclerae clear; moist mucous membranes;   NECK : supple  CHEST/LUNG: Clear to auscultation bilaterally with good air entry   HEART: S1 S2  regular; no murmurs, gallops or rubs  ABDOMEN: Soft, Nontender, Nondistended; Bowel sounds present  EXTREMITIES: no cyanosis; no edema; no calf tenderness  SKIN: warm and dry; no rash  NERVOUS SYSTEM:  Awake and alert; Oriented  to place, person and time ; no new deficits    _________________________________________________  LABS:                        7.4    7.86  )-----------( 222      ( 14 Dec 2020 07:31 )             25.8     12-14    132<L>  |  100  |  17  ----------------------------<  191<H>  3.8   |  21<L>  |  0.60    Ca    8.5      14 Dec 2020 07:31          CAPILLARY BLOOD GLUCOSE      POCT Blood Glucose.: 193 mg/dL (14 Dec 2020 12:05)  POCT Blood Glucose.: 212 mg/dL (14 Dec 2020 07:53)  POCT Blood Glucose.: 250 mg/dL (13 Dec 2020 21:45)  POCT Blood Glucose.: 185 mg/dL (13 Dec 2020 17:14)    RADIOLOGY & ADDITIONAL TESTS:    Xray Chest 1 View- PORTABLE-Urgent (12.11.20 @ 03:20) >  EXAM:  XR CHEST PORTABLE URGENT 1V                            PROCEDURE DATE:  12/11/2020          INTERPRETATION:  CLINICAL INDICATION: 88 years  Female with Chest Pain.    COMPARISON: 10/7/2019    AP view of the chest demonstrates pulmonary vascular congestion and small bilateral pleural effusions. Patchy interstitial and airspace infiltrates are present. There is no pneumothorax.    The heart is enlarged. The mediastinum and lety cannot be assessed due to projection.    The pulmonary vasculature is normal.    There is diffuse osteopenia.    IMPRESSION:    Pulmonary vascular congestion and bilateral pleural effusions suggestive of pulmonary edema. Patchy airspace infiltrates may be on the basis of pulmonary edema or Covid pneumonia.    < end of copied text >    CT Head No Cont (12.11.20 @ 09:58) >  EXAM:  CT BRAIN                            PROCEDURE DATE:  12/11/2020          INTERPRETATION:  HISTORY: Altered mental status    Comparison 08/19/18    Evaluation demonstrates no evidence of mass-effect or midline shift. No intraparenchymal mass lesions or hemorrhage is identified. There is mild left medial temporal lobe atrophy. There is no evidence of hydrocephalus. No extra-axial collections are noted.    The bone windows demonstrate no gross osseous abnormalities. Incidental note is made of a tiny right parietal convexity densely calcified meningioma.    Impression:  1. No acute findings.    < end of copied text >    CT Abdomen and Pelvis w/ IV Cont (12.11.20 @ 09:58) >  EXAM:  CT ABDOMEN AND PELVIS IC                          EXAM:  CT ANGIO CHEST (W)AW IC                            PROCEDURE DATE:  12/11/2020          INTERPRETATION:  CLINICAL INDICATION: 88 years  Female with r/o pe.    COMPARISON: Chest CT 2/22/2015    PROCEDURE:  CT Angiography of the Chest was performed followed by portal venous phase imaging of the Abdomen and Pelvis.  IV Contrast: 90 ml of Omnipaque 350 was injected intravenously. 10 ml were discarded.  Oral contrast: None.  Sagittal and coronal reformats were performed as well as 3D (MIP) reconstructions.    LIMITATION: Absence of enteric contrast limits evaluation of the GI tract.    FINDINGS:  CHEST:  LUNGS AND LARGE AIRWAYS: Patent central airways. Bilateral lower lobe compressive atelectasis. Diffuse interlobular septal thickening. Right upper lobe groundglass opacities. 2.2 x 1.3 cm pleural-based lingular consolidation (65:80). Perivascular soft tissue surrounding the right lower lobe pulmonary veins.  PLEURA: Moderate bilateral pleural effusions.  VESSELS: No evidence of pulmonary embolism. 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 Descending thoracic aorta 2.3 cm. No aortic dissection.  HEART: Moderate cardiomegaly. Mitral annular calcification.No pericardial effusion.  MEDIASTINUM AND LETY: Right hilar adenopathy measuring up to 1.9 cm in short axis. Left hilar adenopathy measuring up to 1.1 cm in short axis. Right lower paratracheal lymph node measuring 1.3 cm in short axis with central calcification. Prevascular lymph nodes measuring up to 1.2 cm in short axis.  CHEST WALL AND LOWER NECK: Within normal limits.    ABDOMEN AND PELVIS:  LIVER: Within normal limits.  BILE DUCTS: Normal caliber.  GALLBLADDER: Trace pericholecystic fluid.  SPLEEN: Within normal limits.  PANCREAS: Within normal limits.  ADRENALS: Within normal limits.  KIDNEYS/URETERS: Bilateral renal hypodensities too small to characterize. 1.8 cm right lower pole renal cyst. Right upper pole renal cortical scarring.    BLADDER: Incompletely distended bladder with diffuse wall thickening.  REPRODUCTIVE ORGANS: Mildly enlarged multi fibroid uterus.    BOWEL: No bowel obstruction. Appendix is not visualized and cannot be assessed.  PERITONEUM: No ascites.  VESSELS: Atherosclerotic aorta without aneurysm.  RETROPERITONEUM/LYMPH NODES: Prominent perigastric lymph nodes measuring up to 8 mm in short axis. No other lymphadenopathy.  ABDOMINAL WALL: Small fat-containing umbilical hernia.  BONES: Mild degenerative changes. Grade 1 L4-5 anterolisthesis    IMPRESSION:    Absence of enteric contrast limits evaluation of the GI tract.    No evidence of pulmonary embolism.    Moderate bilateral pleural effusions with underlying compressive atelectasis lower lobes. Mild pulmonary edema. Right upper lobe groundglass opacity may be on the basis of pulmonary edema or pneumonia.    Soft tissue extending along the right lower lobe pulmonary veins may represent infiltrate or tumor.    Mediastinal and hilar adenopathy secondary to neoplasm or infection.    Cardiomegaly. Ectatic ascending aorta at 3.2 cm.    Trace pericholecystic fluid. Recommend abdominal ultrasound to evaluate for acute cholecystitis.    Nonspecific prominent perigastric lymph nodes.    < end of copied text >      US Hepatic & Pancreatic (12.11.20 @ 15:16) >  EXAM:  US LIVER AND PANCREAS                            PROCEDURE DATE:  12/11/2020          INTERPRETATION:  CLINICAL STATEMENT: Rule out gallstone..  Abdominal pain.    TECHNIQUE: Ultrasound of the right upper quadrant.    COMPARISON: CT head andpelvis 12/11/2020    FINDINGS:  Liver measures 18 cm in length    Trace ascites and pleural effusion noted    There is no gallstone or gallbladder wall thickening. Nonspecific trace cholecystic fluid noted    The common bile duct is not dilated measuring 4 mm.    The pancreas is not well seen.    The right kidney measures 9 cm in length.  There is no hydronephrosis. Small right renal cyst noted    IMPRESSION:  Nonspecific trace pericholecystic fluid.    Enlarged liver    < end of copied text >    Imaging Personally Reviewed:  YES  Consultant(s) Notes Reviewed:   YES    Care Discussed with Consultants : card    Plan of care was discussed with patient and /or primary care giver; all questions and concerns were addressed and care was aligned with patient's wishes.

## 2020-12-14 NOTE — PROGRESS NOTE ADULT - SUBJECTIVE AND OBJECTIVE BOX
CHIEF COMPLAINT:Patient is a 88y old  Female who presents with a chief complaint of Shortness of Breath.Pt appears comfortable.    	  REVIEW OF SYSTEMS:  CONSTITUTIONAL: No fever, weight loss, or fatigue  EYES: No eye pain, visual disturbances, or discharge  ENT:  No difficulty hearing, tinnitus, vertigo; No sinus or throat pain  NECK: No pain or stiffness  RESPIRATORY: No cough, wheezing, chills or hemoptysis; No Shortness of Breath  CARDIOVASCULAR: No chest pain, palpitations, passing out, dizziness, or leg swelling  GASTROINTESTINAL: No abdominal or epigastric pain. No nausea, vomiting, or hematemesis; No diarrhea or constipation. No melena or hematochezia.  GENITOURINARY: No dysuria, frequency, hematuria, or incontinence  NEUROLOGICAL: No headaches, memory loss, loss of strength, numbness, or tremors  SKIN: No itching, burning, rashes, or lesions   LYMPH Nodes: No enlarged glands  ENDOCRINE: No heat or cold intolerance; No hair loss  MUSCULOSKELETAL: No joint pain or swelling; No muscle, back, or extremity pain  PSYCHIATRIC: No depression, anxiety, mood swings, or difficulty sleeping  HEME/LYMPH: No easy bruising, or bleeding gums  ALLERGY AND IMMUNOLOGIC: No hives or eczema	        PHYSICAL EXAM:  T(C): 36.3 (12-14-20 @ 05:03), Max: 37.1 (12-13-20 @ 21:21)  HR: 94 (12-14-20 @ 05:03) (75 - 97)  BP: 156/78 (12-14-20 @ 05:03) (130/46 - 156/78)  RR: 17 (12-14-20 @ 05:03) (16 - 17)  SpO2: 100% (12-14-20 @ 05:03) (94% - 100%)        Appearance: Normal	  HEENT:   Normal oral mucosa, PERRL, EOMI	  Lymphatic: No lymphadenopathy  Cardiovascular: Normal S1 S2, No JVD, No murmurs, No edema  Respiratory: Lungs clear to auscultation	  Psychiatry: A & O x 3, Mood & affect appropriate  Gastrointestinal:  Soft, Non-tender, + BS	  Skin: No rashes, No ecchymoses, No cyanosis	  Neurologic: Non-focal  Extremities: Normal range of motion, No clubbing, cyanosis or edema  Vascular: Peripheral pulses palpable 2+ bilaterally    MEDICATIONS  (STANDING):  aspirin  chewable 81 milliGRAM(s) Oral daily  atorvastatin 40 milliGRAM(s) Oral at bedtime  calcium carbonate 1250 mG  + Vitamin D (OsCal 500 + D) 1 Tablet(s) Oral daily  donepezil 5 milliGRAM(s) Oral at bedtime  furosemide    Tablet 20 milliGRAM(s) Oral daily  insulin lispro (ADMELOG) corrective regimen sliding scale   SubCutaneous three times a day before meals  memantine 10 milliGRAM(s) Oral daily  metoprolol tartrate 50 milliGRAM(s) Oral two times a day  senna 2 Tablet(s) Oral at bedtime    	  	  LABS:	 	                       7.4    7.86  )-----------( 222      ( 14 Dec 2020 07:31 )             25.8     12-14    132<L>  |  100  |  17  ----------------------------<  191<H>  3.8   |  21<L>  |  0.60    Ca    8.5      14 Dec 2020 07:31      proBNP: Serum Pro-Brain Natriuretic Peptide: 16789 pg/mL (12-11 @ 03:43)      TSH: Thyroid Stimulating Hormone, Serum: 3.71 uU/mL (12-12 @ 07:21)      	  Total Binding Capacity in AM (12.11.20 @ 12:32)   Iron - Total Binding Capacity.: 132 ug/dL   % Saturation, Iron: 14 %   Iron Total, Serum: 18 ug/dL   Unsaturated Iron Binding Capacity: 114 ug/dL     Protein Electrophoresis, Serum (12.11.20 @ 20:18)   Protein Total, Serum: 5.4 g/dL   Total Protein, Serum: 5.4 g/dL

## 2020-12-14 NOTE — PROGRESS NOTE ADULT - ASSESSMENT
88 year old female from home, with daughter as HHA, with PMHx of HTN, DM, CHFpEF, dementia, presenting with chief complaint of shortness of breath,pulmonary edema-acute diastolic HF,anemia,Type II MI due to demand ischemia..  1.Tele monitoring.  2.Echocardiogram.  3.Anemia-Iron.  4.Cont asa,b blocker.  5.Acute diastolic HF-lasix 20mg po qd.  6.DM-Insulin.  7.HTN-b blocker.  8.Dementia-aricept,namenda.

## 2020-12-15 NOTE — PROGRESS NOTE ADULT - SUBJECTIVE AND OBJECTIVE BOX
CHIEF COMPLAINT:Patient is a 88y old  Female who presents with a chief complaint of Shortness of Breath.Pt appears comfortable.    	  REVIEW OF SYSTEMS:  CONSTITUTIONAL: No fever, weight loss, or fatigue  EYES: No eye pain, visual disturbances, or discharge  ENT:  No difficulty hearing, tinnitus, vertigo; No sinus or throat pain  NECK: No pain or stiffness  RESPIRATORY: No cough, wheezing, chills or hemoptysis; No Shortness of Breath  CARDIOVASCULAR: No chest pain, palpitations, passing out, dizziness, or leg swelling  GASTROINTESTINAL: No abdominal or epigastric pain. No nausea, vomiting, or hematemesis; No diarrhea or constipation. No melena or hematochezia.  GENITOURINARY: No dysuria, frequency, hematuria, or incontinence  NEUROLOGICAL: No headaches, memory loss, loss of strength, numbness, or tremors  SKIN: No itching, burning, rashes, or lesions   LYMPH Nodes: No enlarged glands  ENDOCRINE: No heat or cold intolerance; No hair loss  MUSCULOSKELETAL: No joint pain or swelling; No muscle, back, or extremity pain  PSYCHIATRIC: No depression, anxiety, mood swings, or difficulty sleeping  HEME/LYMPH: No easy bruising, or bleeding gums  ALLERGY AND IMMUNOLOGIC: No hives or eczema	      PHYSICAL EXAM:  T(C): 36.5 (12-15-20 @ 06:04), Max: 36.7 (12-14-20 @ 21:06)  HR: 86 (12-15-20 @ 06:04) (76 - 86)  BP: 164/61 (12-15-20 @ 06:04) (151/46 - 164/61)  RR: 17 (12-15-20 @ 06:04) (17 - 18)  SpO2: 97% (12-15-20 @ 06:04) (95% - 97%)        Appearance: Normal	  HEENT:   Normal oral mucosa, PERRL, EOMI	  Lymphatic: No lymphadenopathy  Cardiovascular: Normal S1 S2, No JVD, No murmurs, No edema  Respiratory: Lungs clear to auscultation	  Psychiatry: A & O x 3, Mood & affect appropriate  Gastrointestinal:  Soft, Non-tender, + BS	  Skin: No rashes, No ecchymoses, No cyanosis	  Neurologic: Non-focal  Extremities: Normal range of motion, No clubbing, cyanosis or edema  Vascular: Peripheral pulses palpable 2+ bilaterally    MEDICATIONS  (STANDING):  aspirin  chewable 81 milliGRAM(s) Oral daily  atorvastatin 40 milliGRAM(s) Oral at bedtime  calcium carbonate 1250 mG  + Vitamin D (OsCal 500 + D) 1 Tablet(s) Oral daily  donepezil 5 milliGRAM(s) Oral at bedtime  furosemide    Tablet 20 milliGRAM(s) Oral daily  insulin lispro (ADMELOG) corrective regimen sliding scale   SubCutaneous three times a day before meals  memantine 10 milliGRAM(s) Oral daily  metoprolol tartrate 50 milliGRAM(s) Oral two times a day  senna 2 Tablet(s) Oral at bedtime        	  LABS:	 	                       7.1    7.34  )-----------( 225      ( 15 Dec 2020 06:18 )             24.3     12-15    134<L>  |  100  |  16  ----------------------------<  159<H>  3.7   |  25  |  0.54    Ca    8.3<L>      15 Dec 2020 06:18  Mg     1.4     12-15      proBNP: Serum Pro-Brain Natriuretic Peptide: 58166 pg/mL (12-11 @ 03:43)       TSH: Thyroid Stimulating Hormone, Serum: 3.71 uU/mL (12-12 @ 07:21)      	  Transthoracic Echocardiogram (12.14.20 @ 09:46) >  OBSERVATIONS:  Mitral Valve: Mitral annular calcification, and calcified  mitral leaflets with normal diastolic opening. Moderate to  severe mitral regurgitation.  Aortic Root: Aortic Root: 3.5 cm.    Aortic Valve: Calcified trileaflet aortic valve with normal  opening. Mild to moderate aortic regurgitation.  Left Atrium: Moderately dilated left atrium.  LA volume  index = 44 cc/m2.  Left Ventricle: Normal Left Ventricular Systolic Function,  (EF = 55%) Mild concentric left ventricular hypertrophy.  Grade II diastolic dysfunction.  Right Heart: Normal right atrium. Normal right ventricular  size and systolic function (TAPSE 1.7 cm). There is mild  tricuspid regurgitation. There is mild pulmonic  regurgitation.  Pericardium/PleuraNormal pericardium with no pericardial  effusion. Right pleural effusion.  Hemodynamic: RA Pressure is 8 mm Hg. RV systolic pressure  is mildly increased at  39 mm Hg.    < end of copied text >

## 2020-12-15 NOTE — PROGRESS NOTE ADULT - PROBLEM SELECTOR PLAN 7
-IMPROVE VTE score 4  -dvt ppx- hold chemo ppx given anemia, cont SCDs -mag 1.4, supplemented   -repeat level in AM

## 2020-12-15 NOTE — PROGRESS NOTE ADULT - ASSESSMENT
88 year old female from home, with daughter as HHA, with PMHx of HTN, DM, CHFpEF, dementia, presenting with chief complaint of shortness of breath,pulmonary edema-acute diastolic HF,anemia,Type II MI due to demand ischemia..  1.Tele monitoring.  2.Replace Mg+.  3.Anemia-Iron,transfuse 2 units PRBC with lasix 20mg IVx1 in between..  4.Cont asa,b blocker.  5.Acute diastolic HF-lasix 20mg po qd.  6.DM-Insulin.  7.HTN-b blocker,add cozaar 25mg qd.  8.Dementia-aricept,namenda.

## 2020-12-15 NOTE — CONSULT NOTE ADULT - NEGATIVE MUSCULOSKELETAL SYMPTOMS
no muscle cramps/no leg pain L/no stiffness/no neck pain/no arm pain L/no arm pain R/no leg pain R/no back pain

## 2020-12-15 NOTE — CONSULT NOTE ADULT - ASSESSMENT
88 year old female from home, with daughter as HHA, with PMHx of HTN, DM, CHFpEF, dementia, presenting with chief complaint of shortness of breath,pulmonary edema-acute diastolic HF,anemia,Type II MI due to demand ischemia..  1.Tele monitoring.  2.Echocardiogram.  3.Anemia-Fe profile,occult,SPEP,repeat cbc.  4.Add asa, cont b blocker.  5.Acute diastolic HF-lasix 20mg IV qd.  6.DM-Insulin.  7.HTN-b blocker.  8.Dementia-aricept,namenda.
1. The etiology for iron deficiency anemia in this patient is multifactorial.  2. No evidence of acute GI bleeding  3. R/o chronic GI bleeding  4. R/o Colorectal cancer  5. R/o Peptic ulcer disease  6. R/o AVM's    Suggestions:    1. Monitor H/H  2. Transfuse PRBC as needed  3. Check stool for occult blood x 3  4. Avoid NSAID  5. Protonix 40mg daily  6. EGD and colonoscopy  7. Check CEA  8. DVT prophylaxis

## 2020-12-15 NOTE — CONSULT NOTE ADULT - CONSULT REASON
"Subjective:  This is a follow up after pilonidal cystectomy on 3/27/18. The patient has no complaints.     Objective: /80 (BP Location: Right arm, Patient Position: Sitting, Cuff Size: Adult Large)  Ht 5' 9\" (1.753 m)  Wt 224 lb (101.6 kg)  BMI 33.08 kg/m2   The wound is healing well without erythema. Only measures under 1 cm and shallow.    Assessment:   Doing well after pilonidal cystectomy    Plan:  Neosporin/dry guaze BID and follow-up in 2-3 weeks.    "
Anemia
SOB,CHF

## 2020-12-15 NOTE — CONSULT NOTE ADULT - SUBJECTIVE AND OBJECTIVE BOX
[  ] STAT REQUEST              [ X ] ROUTINE REQUEST    Patient is a 88 year old female with Iron deficiency anemia. GI consulted to evaluate.         HPI:  Patient is an 88 year old female from home, with daughter with past medical history significant for HTN, DM, CHF, dementia, admitted with shortness of breath found to have iron deficiency anemia. Patient is poor historian due to dementia. Patient c/o intermitted, crampy abdomibnal pain associated with constipation. Patient denies nausea, vomiting, hematemesis, hematochezia, melena, fever, chills, chest pain, cough, hematuria, dysuria or diarrhea.          PAIN MANAGEMENT:  Pain Scale:                2-3 /10  Pain Location:  Diffuse abdominal pain    Prior Colonoscopy:  No prior colonoscopy     PAST MEDICAL HISTORY  CHF  CAD  TIA  Myositis  Osteoarthritis  HTN    DM         PAST SURGICAL HISTORY  No significant past surgical history        Allergies    No Known Allergies    Intolerances  None         MEDICATIONS  (STANDING):  aspirin  chewable 81 milliGRAM(s) Oral daily  atorvastatin 40 milliGRAM(s) Oral at bedtime  calcium carbonate 1250 mG  + Vitamin D (OsCal 500 + D) 1 Tablet(s) Oral daily  donepezil 5 milliGRAM(s) Oral at bedtime  furosemide    Tablet 20 milliGRAM(s) Oral daily  insulin lispro (ADMELOG) corrective regimen sliding scale   SubCutaneous three times a day before meals  losartan 25 milliGRAM(s) Oral daily  memantine 10 milliGRAM(s) Oral daily  metoprolol tartrate 50 milliGRAM(s) Oral two times a day  senna 2 Tablet(s) Oral at bedtime         SOCIAL HISTORY  Advanced Directives:       [  ] Full Code       [ X ] DNR  Marital Status:         [  ] M      [ X ] S      [  ] D       [  ] W  Children:       [ X ] Yes      [  ] No  Occupation:        [  ] Employed       [ X ] Unemployed       [  ] Retired  Diet:       [ X ] Regular       [  ] PEG feeding          [  ] NG tube feeding  Drug Use:           [ X ] Patient denied          [  ] Yes  Alcohol:           [ X ] No             [  ] Yes (socially)         [  ] Yes (chronic)  Tobacco:           [  ] Yes           [ X ] No      FAMILY HISTORY  [ X ] Heart Disease            [ X ] Diabetes             [ X ] HTN             [  ] Colon Cancer             [  ] Stomach Cancer              [  ] Pancreatic Cancer      VITAL SIGNS   Vital Signs Last 24 Hrs  T(C): 36.9 (15 Dec 2020 17:25), Max: 36.9 (15 Dec 2020 17:25)  T(F): 98.4 (15 Dec 2020 17:25), Max: 98.4 (15 Dec 2020 17:25)  HR: 85 (15 Dec 2020 17:25) (75 - 96)  BP: 167/65 (15 Dec 2020 17:25) (125/80 - 171/68)   RR: 18 (15 Dec 2020 17:25) (17 - 18)  SpO2: 97% (15 Dec 2020 17:25) (94% - 98%)  Daily Weight in k.4 (15 Dec 2020 06:04)         CBC Full  -  ( 15 Dec 2020 06:18 )  WBC Count : 7.34 K/uL  RBC Count : 3.22 M/uL  Hemoglobin : 7.1 g/dL  Hematocrit : 24.3 %  Platelet Count - Automated : 225 K/uL  Mean Cell Volume : 75.5 fl  Mean Cell Hemoglobin : 22.0 pg  Mean Cell Hemoglobin Concentration : 29.2 gm/dL  Auto Neutrophil # : x  Auto Lymphocyte # : x  Auto Monocyte # : x  Auto Eosinophil # : x  Auto Basophil # : x  Auto Neutrophil % : x  Auto Lymphocyte % : x  Auto Monocyte % : x  Auto Eosinophil % : x  Auto Basophil % : x      12-15    134<L>  |  100  |  16  ----------------------------<  159<H>  3.7   |  25  |  0.54    Ca    8.3<L>      15 Dec 2020 06:18  Mg     1.4     12-15      Iron with Total Binding Capacity in AM (20 @ 12:32)   Iron - Total Binding Capacity.: 132 ug/dL   % Saturation, Iron: 14 %   Iron Total, Serum: 18 ug/dL   Unsaturated Iron Binding Capacity: 114 ug/dL     Urinalysis (20 @ 08:17)   Glucose Qualitative, Urine: Negative   Blood, Urine: Negative   pH Urine: 7.0   Color: Yellow   Urine Appearance: Clear   Bilirubin: Negative   Ketone - Urine: Negative   Specific Gravity: 1.010   Protein, Urine: 15   Urobilinogen: Negative   Nitrite: Negative   Leukocyte Esterase Concentration: Negative      ECG  Ventricular Rate 96 BPM    Atrial Rate 96 BPM    P-R Interval 140 ms    QRS Duration 82 ms    Q-T Interval 358 ms    QTC Calculation(Bazett) 452 ms    P Axis 38 degrees    R Axis -25 degrees    T Axis 53 degrees    Diagnosis Line *** Poor data quality, interpretation may be adversely affected  Normal sinus rhythm  Normal ECG         RADIOLOGY/IMAGING                EXAM:  US LIVER AND PANCREAS                            PROCEDURE DATE:  2020          INTERPRETATION:  CLINICAL STATEMENT: Rule out gallstone..  Abdominal pain.    TECHNIQUE: Ultrasound of the right upper quadrant.    COMPARISON: CT head andpelvis 2020    FINDINGS:  Liver measures 18 cm in length    Trace ascites and pleural effusion noted    There is no gallstone or gallbladder wall thickening. Nonspecific trace cholecystic fluid noted    The common bile duct is not dilated measuring 4 mm.    The pancreas is not well seen.    The right kidney measures 9 cm in length.  There is no hydronephrosis. Small right renal cyst noted    IMPRESSION:  Nonspecific trace pericholecystic fluid.         EXAM:  CT ABDOMEN AND PELVIS IC                          EXAM:  CT ANGIO CHEST (W)AW IC                            PROCEDURE DATE:  2020          INTERPRETATION:  CLINICAL INDICATION: 88 years  Female with r/o pe.    COMPARISON: Chest CT 2015    PROCEDURE:  CT Angiography of the Chest was performed followed by portal venous phase imaging of the Abdomen and Pelvis.  IV Contrast: 90 ml of Omnipaque 350 was injected intravenously. 10 ml were discarded.  Oral contrast: None.  Sagittal and coronal reformats were performed as well as 3D (MIP) reconstructions.    LIMITATION: Absence of enteric contrast limits evaluation of the GI tract.    FINDINGS:  CHEST:  LUNGS AND LARGE AIRWAYS: Patent central airways. Bilateral lower lobe compressive atelectasis. Diffuse interlobular septal thickening. Right upper lobe groundglass opacities. 2.2 x 1.3 cm pleural-based lingular consolidation (65:80). Perivascular soft tissue surrounding the right lower lobe pulmonary veins.  PLEURA: Moderate bilateral pleural effusions.  VESSELS: No evidence of pulmonary embolism. 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 Descending thoracic aorta 2.3 cm. No aortic dissection.  HEART: Moderate cardiomegaly. Mitral annular calcification.No pericardial effusion.  MEDIASTINUM AND LETY: Right hilar adenopathy measuring up to 1.9 cm in short axis. Left hilar adenopathy measuring up to 1.1 cm in short axis. Right lower paratracheal lymph node measuring 1.3 cm in short axis with central calcification. Prevascular lymph nodes measuring up to 1.2 cm in short axis.  CHEST WALL AND LOWER NECK: Within normal limits.    ABDOMEN AND PELVIS:  LIVER: Within normal limits.  BILE DUCTS: Normal caliber.  GALLBLADDER: Trace pericholecystic fluid.  SPLEEN: Within normal limits.  PANCREAS: Within normal limits.  ADRENALS: Within normal limits.  KIDNEYS/URETERS: Bilateral renal hypodensities too small to characterize. 1.8 cm right lower pole renal cyst. Right upper pole renal cortical scarring.    BLADDER: Incompletely distended bladder with diffuse wall thickening.  REPRODUCTIVE ORGANS: Mildly enlarged multi fibroid uterus.    BOWEL: No bowel obstruction. Appendix is not visualized and cannot be assessed.  PERITONEUM: No ascites.  VESSELS: Atherosclerotic aorta without aneurysm.  RETROPERITONEUM/LYMPH NODES: Prominent perigastric lymph nodes measuring up to 8 mm in short axis. No other lymphadenopathy.  ABDOMINAL WALL: Small fat-containing umbilical hernia.  BONES: Mild degenerative changes. Grade 1 L4-5 anterolisthesis    IMPRESSION:    Absence of enteric contrast limits evaluation of the GI tract.    No evidence of pulmonary embolism.    Moderate bilateral pleural effusions with underlying compressive atelectasis lower lobes. Mild pulmonary edema. Right upper lobe groundglass opacity may be on the basis of pulmonary edema or pneumonia.    Soft tissue extending along the right lower lobe pulmonary veins may represent infiltrate or tumor.    Mediastinal and hilar adenopathy secondary to neoplasm or infection.    Cardiomegaly. Ectatic ascending aorta at 3.2 cm.    Trace pericholecystic fluid. Recommend abdominal ultrasound to evaluate for acute cholecystitis.    Nonspecific prominent perigastric lymph nodes.

## 2020-12-15 NOTE — CONSULT NOTE ADULT - GASTROINTESTINAL DETAILS
soft/no rebound tenderness/no rigidity/no distention/no masses palpable/bowel sounds normal/nontender/no guarding

## 2020-12-15 NOTE — CONSULT NOTE ADULT - NEGATIVE OPHTHALMOLOGIC SYMPTOMS
no lacrimation L/no discharge L/no pain L/no diplopia/no pain R/no lacrimation R/no blurred vision L/no photophobia/no blurred vision R/no discharge R/no irritation L/no irritation R/no scleral injection L

## 2020-12-15 NOTE — CONSULT NOTE ADULT - NEGATIVE ENMT SYMPTOMS
no nasal obstruction/no tinnitus/no vertigo/no sinus symptoms/no nasal discharge/no post-nasal discharge/no dysphagia/no gum bleeding/no dry mouth/no throat pain/no hearing difficulty/no ear pain/no nasal congestion/no nose bleeds

## 2020-12-15 NOTE — PROGRESS NOTE ADULT - SUBJECTIVE AND OBJECTIVE BOX
Reason for Admission:  · Reason for Admission	Shortness of Breath      · Subjective and Objective:   Patient is a 88y old  Female who presents with a chief complaint of Shortness of Breath (15 Dec 2020 10:44)    OVERNIGHT EVENTS: no acute events overnight     REVIEW OF SYSTEMS: limited due to mental status denies any pain or discomfort       T(C): 36.8 (12-15-20 @ 13:07), Max: 36.8 (12-15-20 @ 13:07)  HR: 75 (12-15-20 @ 13:07) (75 - 86)  BP: 171/68 (12-15-20 @ 13:07) (151/46 - 171/68)  RR: 18 (12-15-20 @ 13:07) (17 - 18)  SpO2: 98% (12-15-20 @ 13:07) (95% - 98%)  Wt(kg): --Vital Signs Last 24 Hrs  T(C): 36.8 (15 Dec 2020 13:07), Max: 36.8 (15 Dec 2020 13:07)  T(F): 98.3 (15 Dec 2020 13:07), Max: 98.3 (15 Dec 2020 13:07)  HR: 75 (15 Dec 2020 13:07) (75 - 86)  BP: 171/68 (15 Dec 2020 13:07) (151/46 - 171/68)  BP(mean): --  RR: 18 (15 Dec 2020 13:07) (17 - 18)  SpO2: 98% (15 Dec 2020 13:07) (95% - 98%)    MEDICATIONS  (STANDING):  aspirin  chewable 81 milliGRAM(s) Oral daily  atorvastatin 40 milliGRAM(s) Oral at bedtime  calcium carbonate 1250 mG  + Vitamin D (OsCal 500 + D) 1 Tablet(s) Oral daily  donepezil 5 milliGRAM(s) Oral at bedtime  furosemide    Tablet 20 milliGRAM(s) Oral daily  insulin lispro (ADMELOG) corrective regimen sliding scale   SubCutaneous three times a day before meals  losartan 25 milliGRAM(s) Oral daily  memantine 10 milliGRAM(s) Oral daily  metoprolol tartrate 50 milliGRAM(s) Oral two times a day  senna 2 Tablet(s) Oral at bedtime    MEDICATIONS  (PRN):      PHYSICAL EXAM:  GENERAL: NAD, cachectic   EYES: clear conjunctiva  ENMT: Moist mucous membranes  NECK: Supple, No JVD  CHEST/LUNG: Clear to auscultation bilaterally; No rales, rhonchi, wheezing, or rubs  HEART: S1, S2, Regular rate and rhythm  ABDOMEN: Soft, Nontender, Nondistended; Bowel sounds present  NEURO: Alert & Oriented to person and place, forgetful   EXTREMITIES: No LE edema, no calf tenderness  SKIN: No rashes or lesions    Consultant(s) Notes Reviewed:  [x ] YES  [ ] NO  Care Discussed with Consultants/Other Providers [ x] YES  [ ] NO    LABS:                        7.1    7.34  )-----------( 225      ( 15 Dec 2020 06:18 )             24.3     12-15    134<L>  |  100  |  16  ----------------------------<  159<H>  3.7   |  25  |  0.54    Ca    8.3<L>      15 Dec 2020 06:18  Mg     1.4     12-15    CAPILLARY BLOOD GLUCOSE      POCT Blood Glucose.: 255 mg/dL (15 Dec 2020 11:32)  POCT Blood Glucose.: 187 mg/dL (15 Dec 2020 08:45)  POCT Blood Glucose.: 208 mg/dL (14 Dec 2020 21:51)  POCT Blood Glucose.: 198 mg/dL (14 Dec 2020 16:43)      RADIOLOGY & ADDITIONAL TESTS:    < from: CT Abdomen and Pelvis w/ IV Cont (12.11.20 @ 09:58) >    EXAM:  CT ABDOMEN AND PELVIS IC                          EXAM:  CT ANGIO CHEST (W)AW IC                            PROCEDURE DATE:  12/11/2020          INTERPRETATION:  CLINICAL INDICATION: 88 years  Female with r/o pe.    COMPARISON: Chest CT 2/22/2015    PROCEDURE:  CT Angiography of the Chest was performed followed by portal venous phase imaging of the Abdomen and Pelvis.  IV Contrast: 90 ml of Omnipaque 350 was injected intravenously. 10 ml were discarded.  Oral contrast: None.  Sagittal and coronal reformats were performed as well as 3D (MIP) reconstructions.    LIMITATION: Absence of enteric contrast limits evaluation of the GI tract.    FINDINGS:  CHEST:  LUNGS AND LARGE AIRWAYS: Patent central airways. Bilateral lower lobe compressive atelectasis. Diffuse interlobular septal thickening. Right upper lobe groundglass opacities. 2.2 x 1.3 cm pleural-based lingular consolidation (65:80). Perivascular soft tissue surrounding the right lower lobe pulmonary veins.  PLEURA: Moderate bilateral pleural effusions.  VESSELS: No evidence of pulmonary embolism. 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 Descending thoracic aorta 2.3 cm. No aortic dissection.  HEART: Moderate cardiomegaly. Mitral annular calcification.No pericardial effusion.  MEDIASTINUM AND LETY: Right hilar adenopathy measuring up to 1.9 cm in short axis. Left hilar adenopathy measuring up to 1.1 cm in short axis. Right lower paratracheal lymph node measuring 1.3 cm in short axis with central calcification. Prevascular lymph nodes measuring up to 1.2 cm in short axis.  CHEST WALL AND LOWER NECK: Within normal limits.    ABDOMEN AND PELVIS:  LIVER: Within normal limits.  BILE DUCTS: Normal caliber.  GALLBLADDER: Trace pericholecystic fluid.  SPLEEN: Within normal limits.  PANCREAS: Within normal limits.  ADRENALS: Within normal limits.  KIDNEYS/URETERS: Bilateral renal hypodensities too small to characterize. 1.8 cm right lower pole renal cyst. Right upper pole renal cortical scarring.    BLADDER: Incompletely distended bladder with diffuse wall thickening.  REPRODUCTIVE ORGANS: Mildly enlarged multi fibroid uterus.    BOWEL: No bowel obstruction. Appendix is not visualized and cannot be assessed.  PERITONEUM: No ascites.  VESSELS: Atherosclerotic aorta without aneurysm.  RETROPERITONEUM/LYMPH NODES: Prominent perigastric lymph nodes measuring up to 8 mm in short axis. No other lymphadenopathy.  ABDOMINAL WALL: Small fat-containing umbilical hernia.  BONES: Mild degenerative changes. Grade 1 L4-5 anterolisthesis    IMPRESSION:    Absence of enteric contrast limits evaluation of the GI tract.    No evidence of pulmonary embolism.    Moderate bilateral pleural effusions with underlying compressive atelectasis lower lobes. Mild pulmonary edema. Right upper lobe groundglass opacity may be on the basis of pulmonary edema or pneumonia.    Soft tissue extending along the right lower lobe pulmonary veins may represent infiltrate or tumor.    Mediastinal and hilar adenopathy secondary to neoplasm or infection.    Cardiomegaly. Ectatic ascending aorta at 3.2 cm.    Trace pericholecystic fluid. Recommend abdominal ultrasound to evaluate for acute cholecystitis.    Nonspecific prominent perigastric lymph nodes.      < end of copied text >    < from: Transthoracic Echocardiogram (12.14.20 @ 09:46) >    Patient name: ALEXIS HERNANDEZ  YOB: 1932   Age: 88 (F)   MR#: [id number]  Study Date: 12/14/2020  Location: [address] [place], [place] [address]ographer: Ok Hess [facility]  Study quality: Technically good  Referring Physician:  ELIZABETH WALTER MD  Blood Pressure: 130/46 mmHg  Height: 147 cm  Weight: 49 kg  BSA: 1.4 m2  ------------------------------------------------------------------------    PROCEDURE: Transthoracic echocardiogram with 2-D, M-Mode  and complete spectral and color flow Doppler.  INDICATION:Cardiomyopathy, unspecified (I42.9)  HISTORY:  ------------------------------------------------------------------------  DIMENSIONS:  Dimensions:     Normal Values:  LA:     4.3 cm    2.0 - 4.0 cm  Ao:     3.5 cm    2.0 - 3.8 cm  SEPTUM: 1.0 cm    0.6 - 1.2 cm  PWT:    1.0 cm    0.6 - 1.1 cm  LVIDd:  4.0 cm    3.0 - 5.6 cm  LVIDs:  3.0 cm    1.8 - 4.0 cm      Derived Variables:  LVMI: 91 g/m2  RWT: 0.50  Ejection Fraction Visual Estimate: 55 %  Ejection Fraction Rodriguez: 58 %    ------------------------------------------------------------------------  OBSERVATIONS:  Mitral Valve: Mitral annular calcification, and calcified  mitral leaflets with normal diastolic opening. Moderate to  severe mitral regurgitation.  Aortic Root: Aortic Root: 3.5 cm.    Aortic Valve: Calcified trileaflet aortic valve with normal  opening. Mild to moderate aortic regurgitation.  Left Atrium: Moderately dilated left atrium.  LA volume  index = 44 cc/m2.  Left Ventricle: Normal Left Ventricular Systolic Function,  (EF = 55%) Mild concentric left ventricular hypertrophy.  Grade II diastolic dysfunction.  Right Heart: Normal right atrium. Normal right ventricular  size and systolic function (TAPSE 1.7 cm). There is mild  tricuspid regurgitation. There is mild pulmonic  regurgitation.  Pericardium/PleuraNormal pericardium with no pericardial  effusion. Right pleural effusion.  Hemodynamic: RA Pressure is 8 mm Hg. RV systolic pressure  is mildly increased at  39 mm Hg.  ------------------------------------------------------------------------  CONCLUSIONS:  1. Mitral annular calcification, and calcified mitral  leaflets with normal diastolic opening. Moderate to severe  mitral regurgitation.  2. Calcified trileaflet aortic valve with normal opening.  Mild to moderate aortic regurgitation.  3. Aortic Root: 3.5 cm.    4. Moderately dilated left atrium.  LA volume index = 44  cc/m2.  5. Mild concentric left ventricular hypertrophy.  6. Normal Left Ventricular Systolic Function,  (EF = 55%)  7. Grade II diastolic dysfunction.  8. Normal right atrium.  9. Normal right ventricular size and systolic function  (TAPSE 1.7 cm).  10. RA Pressure is 8 mm Hg.  11. RV systolic pressure is mildly increased at  39 mm Hg.  12. There is mild tricuspid regurgitation.  13. There is mild pulmonic regurgitation.  14. Normal pericardium with no pericardial effusion.  15. Right pleural effusion.      < end of copied text >      Imaging Personally Reviewed:  [ ] YES  [ ] NO      Goals of Care:    GOALS OF CARE:  · Participants	Family; HCP Leyla Lux [phone]     Advance Directives:  · Does patient have Advance Directive	No     Conversation Discussion:  · Conversation	Diagnosis; Prognosis  · Conversation Details	Discussed with HCP Leyla Lux [phone] about Cardiac and respiratory resuscitative measures including CPR, shock, vasopressors, invasive ventilatory support via intubation. All risks and benefits discussed. All questions answered.   pt DNR/DNI   MOLST in chart reviewed     Treatment Guidelines:  · Decision Maker	Health Care Proxy     Location of Discussion:  · Location of discussion	Telephone    Assessment and Plan:   · Assessment	  Patient is an 88 year old female with PMHx of dementia, HTN, DM, CHFpEF, presenting with shortness of breath. WIth findings of pulmonary congestion, BNP and trop elevation, D-dimer elevation, CT negative for PE.  Pt. admitted to medicine for acute on chronic HF, followed by card Dr. Montes, on Lasix diuresis with good effect.  Pt. evaluated by PT, recommended KURT however pt. and family decline KURT and wish to discharge to home.  Pt.'s daughter is CHRISTOPHER.  Hospital course complicated with worsening anemia requiring blood transfusion, GI Dr. Garza consulted     Problem/Plan - 1:  ·  Problem: Acute on chronic diastolic (congestive) heart failure.  Plan: -p/w worsening sob likely in setting of acute on chronic diastolic heart failure   -Echo with mod to sev MR, GD2DDm and HFpEF of 55%  -cont Lasix, BB, statin and ASA   -mon I&Os   -mon daily weights   -Cardio Dr. Montes.      Problem/Plan - 2:  ·  Problem: Hypertensive heart disease with acute diastolic congestive heart failure.  Plan: -BP trending up   -cont Lasix, Metoprolol, Losartan added today   -mon BP  -rest plan as above.      Problem/Plan - 3:  ·  Problem: Anemia.  Plan: -hg trending low, anemia multifactorial, low baseline, iron deficiency, poor nutritional status and chronic diseases   -hg trending low, no acute sign or symptoms of bleeding   -CT abd and pelvis with no acute pathology   -transfuse 1 PRBC today   -f/u FOBT   -GI Dr. Garza consulted.      Problem/Plan - 4:  ·  Problem: Diabetes.  Plan: -on Janumet 50/1000 BID at home   -cont HSSC   -A1c- 6.8.      Problem/Plan - 5:  ·  Problem: Dementia.  Plan: -cont home dose of donepezil and memantine  -supportive care.      Problem/Plan - 6:  Problem: Severe protein-calorie malnutrition. Plan: -likely due to chronic diseases and dementia   -supportive care.     Problem/Plan - 7:  ·  Problem: Hypomagnesemia. Plan: -mag 1.4, supplemented   -repeat level in AM.     Problem/Plan - 8:  ·  Problem: Need for prophylactic measure. Plan: -IMPROVE VTE score 4  -dvt ppx- hold chemo ppx given anemia, cont SCDs.     Problem/Plan - 9:  ·  Problem: Advance care planning.  Plan: -see GOC above.

## 2020-12-15 NOTE — PROGRESS NOTE ADULT - ASSESSMENT
Patient is an 88 year old female with PMHx of dementia, HTN, DM, CHFpEF, presenting with shortness of breath. WIth findings of pulmonary congestion, BNP and trop elevation, D-dimer elevation, CT negative for PE.  Pt. admitted to medicine for acute on chronic HF, followed by card Dr. Montes, on Lasix diuresis with good effect.  Pt. evaluated by PT, recommended KURT however pt. and family decline KURT and wish to discharge to home.  Pt.'s daughter is HHA.  Hospital course complicated with worsening anemia requiring blood transfusion, GI Dr. Garza consulted

## 2020-12-15 NOTE — PROGRESS NOTE ADULT - SUBJECTIVE AND OBJECTIVE BOX
Patient is a 88y old  Female who presents with a chief complaint of Shortness of Breath (15 Dec 2020 10:44)    OVERNIGHT EVENTS: no acute events overnight     REVIEW OF SYSTEMS: limited due to mental status denies any pain or discomfort       T(C): 36.8 (12-15-20 @ 13:07), Max: 36.8 (12-15-20 @ 13:07)  HR: 75 (12-15-20 @ 13:07) (75 - 86)  BP: 171/68 (12-15-20 @ 13:07) (151/46 - 171/68)  RR: 18 (12-15-20 @ 13:07) (17 - 18)  SpO2: 98% (12-15-20 @ 13:07) (95% - 98%)  Wt(kg): --Vital Signs Last 24 Hrs  T(C): 36.8 (15 Dec 2020 13:07), Max: 36.8 (15 Dec 2020 13:07)  T(F): 98.3 (15 Dec 2020 13:07), Max: 98.3 (15 Dec 2020 13:07)  HR: 75 (15 Dec 2020 13:07) (75 - 86)  BP: 171/68 (15 Dec 2020 13:07) (151/46 - 171/68)  BP(mean): --  RR: 18 (15 Dec 2020 13:07) (17 - 18)  SpO2: 98% (15 Dec 2020 13:07) (95% - 98%)    MEDICATIONS  (STANDING):  aspirin  chewable 81 milliGRAM(s) Oral daily  atorvastatin 40 milliGRAM(s) Oral at bedtime  calcium carbonate 1250 mG  + Vitamin D (OsCal 500 + D) 1 Tablet(s) Oral daily  donepezil 5 milliGRAM(s) Oral at bedtime  furosemide    Tablet 20 milliGRAM(s) Oral daily  insulin lispro (ADMELOG) corrective regimen sliding scale   SubCutaneous three times a day before meals  losartan 25 milliGRAM(s) Oral daily  memantine 10 milliGRAM(s) Oral daily  metoprolol tartrate 50 milliGRAM(s) Oral two times a day  senna 2 Tablet(s) Oral at bedtime    MEDICATIONS  (PRN):      PHYSICAL EXAM:  GENERAL: NAD, cachectic   EYES: clear conjunctiva  ENMT: Moist mucous membranes  NECK: Supple, No JVD  CHEST/LUNG: Clear to auscultation bilaterally; No rales, rhonchi, wheezing, or rubs  HEART: S1, S2, Regular rate and rhythm  ABDOMEN: Soft, Nontender, Nondistended; Bowel sounds present  NEURO: Alert & Oriented to person and place, forgetful   EXTREMITIES: No LE edema, no calf tenderness  SKIN: No rashes or lesions    Consultant(s) Notes Reviewed:  [x ] YES  [ ] NO  Care Discussed with Consultants/Other Providers [ x] YES  [ ] NO    LABS:                        7.1    7.34  )-----------( 225      ( 15 Dec 2020 06:18 )             24.3     12-15    134<L>  |  100  |  16  ----------------------------<  159<H>  3.7   |  25  |  0.54    Ca    8.3<L>      15 Dec 2020 06:18  Mg     1.4     12-15    CAPILLARY BLOOD GLUCOSE      POCT Blood Glucose.: 255 mg/dL (15 Dec 2020 11:32)  POCT Blood Glucose.: 187 mg/dL (15 Dec 2020 08:45)  POCT Blood Glucose.: 208 mg/dL (14 Dec 2020 21:51)  POCT Blood Glucose.: 198 mg/dL (14 Dec 2020 16:43)      RADIOLOGY & ADDITIONAL TESTS:    < from: CT Abdomen and Pelvis w/ IV Cont (12.11.20 @ 09:58) >    EXAM:  CT ABDOMEN AND PELVIS IC                          EXAM:  CT ANGIO CHEST (W)AW IC                            PROCEDURE DATE:  12/11/2020          INTERPRETATION:  CLINICAL INDICATION: 88 years  Female with r/o pe.    COMPARISON: Chest CT 2/22/2015    PROCEDURE:  CT Angiography of the Chest was performed followed by portal venous phase imaging of the Abdomen and Pelvis.  IV Contrast: 90 ml of Omnipaque 350 was injected intravenously. 10 ml were discarded.  Oral contrast: None.  Sagittal and coronal reformats were performed as well as 3D (MIP) reconstructions.    LIMITATION: Absence of enteric contrast limits evaluation of the GI tract.    FINDINGS:  CHEST:  LUNGS AND LARGE AIRWAYS: Patent central airways. Bilateral lower lobe compressive atelectasis. Diffuse interlobular septal thickening. Right upper lobe groundglass opacities. 2.2 x 1.3 cm pleural-based lingular consolidation (65:80). Perivascular soft tissue surrounding the right lower lobe pulmonary veins.  PLEURA: Moderate bilateral pleural effusions.  VESSELS: No evidence of pulmonary embolism. 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 Descending thoracic aorta 2.3 cm. No aortic dissection.  HEART: Moderate cardiomegaly. Mitral annular calcification.No pericardial effusion.  MEDIASTINUM AND LETY: Right hilar adenopathy measuring up to 1.9 cm in short axis. Left hilar adenopathy measuring up to 1.1 cm in short axis. Right lower paratracheal lymph node measuring 1.3 cm in short axis with central calcification. Prevascular lymph nodes measuring up to 1.2 cm in short axis.  CHEST WALL AND LOWER NECK: Within normal limits.    ABDOMEN AND PELVIS:  LIVER: Within normal limits.  BILE DUCTS: Normal caliber.  GALLBLADDER: Trace pericholecystic fluid.  SPLEEN: Within normal limits.  PANCREAS: Within normal limits.  ADRENALS: Within normal limits.  KIDNEYS/URETERS: Bilateral renal hypodensities too small to characterize. 1.8 cm right lower pole renal cyst. Right upper pole renal cortical scarring.    BLADDER: Incompletely distended bladder with diffuse wall thickening.  REPRODUCTIVE ORGANS: Mildly enlarged multi fibroid uterus.    BOWEL: No bowel obstruction. Appendix is not visualized and cannot be assessed.  PERITONEUM: No ascites.  VESSELS: Atherosclerotic aorta without aneurysm.  RETROPERITONEUM/LYMPH NODES: Prominent perigastric lymph nodes measuring up to 8 mm in short axis. No other lymphadenopathy.  ABDOMINAL WALL: Small fat-containing umbilical hernia.  BONES: Mild degenerative changes. Grade 1 L4-5 anterolisthesis    IMPRESSION:    Absence of enteric contrast limits evaluation of the GI tract.    No evidence of pulmonary embolism.    Moderate bilateral pleural effusions with underlying compressive atelectasis lower lobes. Mild pulmonary edema. Right upper lobe groundglass opacity may be on the basis of pulmonary edema or pneumonia.    Soft tissue extending along the right lower lobe pulmonary veins may represent infiltrate or tumor.    Mediastinal and hilar adenopathy secondary to neoplasm or infection.    Cardiomegaly. Ectatic ascending aorta at 3.2 cm.    Trace pericholecystic fluid. Recommend abdominal ultrasound to evaluate for acute cholecystitis.    Nonspecific prominent perigastric lymph nodes.      < end of copied text >    < from: Transthoracic Echocardiogram (12.14.20 @ 09:46) >    Patient name: ALEXIS HERNANDEZ  YOB: 1932   Age: 88 (F)   MR#: [id number]  Study Date: 12/14/2020  Location: [address] [place], [place] [address]Sonographer: Ok Hess [facility]  Study quality: Technically good  Referring Physician:  ELIZABETH WALTER MD  Blood Pressure: 130/46 mmHg  Height: 147 cm  Weight: 49 kg  BSA: 1.4 m2  ------------------------------------------------------------------------    PROCEDURE: Transthoracic echocardiogram with 2-D, M-Mode  and complete spectral and color flow Doppler.  INDICATION:Cardiomyopathy, unspecified (I42.9)  HISTORY:  ------------------------------------------------------------------------  DIMENSIONS:  Dimensions:     Normal Values:  LA:     4.3 cm    2.0 - 4.0 cm  Ao:     3.5 cm    2.0 - 3.8 cm  SEPTUM: 1.0 cm    0.6 - 1.2 cm  PWT:    1.0 cm    0.6 - 1.1 cm  LVIDd:  4.0 cm    3.0 - 5.6 cm  LVIDs:  3.0 cm    1.8 - 4.0 cm      Derived Variables:  LVMI: 91 g/m2  RWT: 0.50  Ejection Fraction Visual Estimate: 55 %  Ejection Fraction Rodriguez: 58 %    ------------------------------------------------------------------------  OBSERVATIONS:  Mitral Valve: Mitral annular calcification, and calcified  mitral leaflets with normal diastolic opening. Moderate to  severe mitral regurgitation.  Aortic Root: Aortic Root: 3.5 cm.    Aortic Valve: Calcified trileaflet aortic valve with normal  opening. Mild to moderate aortic regurgitation.  Left Atrium: Moderately dilated left atrium.  LA volume  index = 44 cc/m2.  Left Ventricle: Normal Left Ventricular Systolic Function,  (EF = 55%) Mild concentric left ventricular hypertrophy.  Grade II diastolic dysfunction.  Right Heart: Normal right atrium. Normal right ventricular  size and systolic function (TAPSE 1.7 cm). There is mild  tricuspid regurgitation. There is mild pulmonic  regurgitation.  Pericardium/PleuraNormal pericardium with no pericardial  effusion. Right pleural effusion.  Hemodynamic: RA Pressure is 8 mm Hg. RV systolic pressure  is mildly increased at  39 mm Hg.  ------------------------------------------------------------------------  CONCLUSIONS:  1. Mitral annular calcification, and calcified mitral  leaflets with normal diastolic opening. Moderate to severe  mitral regurgitation.  2. Calcified trileaflet aortic valve with normal opening.  Mild to moderate aortic regurgitation.  3. Aortic Root: 3.5 cm.    4. Moderately dilated left atrium.  LA volume index = 44  cc/m2.  5. Mild concentric left ventricular hypertrophy.  6. Normal Left Ventricular Systolic Function,  (EF = 55%)  7. Grade II diastolic dysfunction.  8. Normal right atrium.  9. Normal right ventricular size and systolic function  (TAPSE 1.7 cm).  10. RA Pressure is 8 mm Hg.  11. RV systolic pressure is mildly increased at  39 mm Hg.  12. There is mild tricuspid regurgitation.  13. There is mild pulmonic regurgitation.  14. Normal pericardium with no pericardial effusion.  15. Right pleural effusion.      < end of copied text >      Imaging Personally Reviewed:  [ ] YES  [ ] NO

## 2020-12-15 NOTE — CONSULT NOTE ADULT - NEGATIVE PSYCHIATRIC SYMPTOMS
no paranoia/no suicidal ideation/no depression/no insomnia/no mood swings/no agitation/no anxiety/no memory loss

## 2020-12-15 NOTE — PROGRESS NOTE ADULT - CONVERSATION DETAILS
Discussed with HCP Leyla Lux [phone] about Cardiac and respiratory resuscitative measures including CPR, shock, vasopressors, invasive ventilatory support via intubation. All risks and benefits discussed. All questions answered.   pt DNR/DNI   MOLST in chart reviewed

## 2020-12-15 NOTE — PROGRESS NOTE ADULT - PROBLEM SELECTOR PLAN 1
-p/w worsening sob likely in setting of acute on chronic diastolic heart failure   -Echo with mod to sev MR, GD2DDm and HFpEF of 55%  -cont Lasix, BB, statin and ASA   -mon I&Os   -mon daily weights   -Cardio Dr. Montes

## 2020-12-15 NOTE — PROGRESS NOTE ADULT - PROBLEM SELECTOR PLAN 3
-hg trending low, anemia multifactorial, low baseline, iron deficiency, poor nutritional status and chronic diseases   -hg trending low, no acute sign or symptoms of bleeding   -CT abd and pelvis with no acute pathology   -transfuse 1 PRBC today   -f/u FOBT   -GI Dr. Garza consulted

## 2020-12-16 NOTE — DIETITIAN NUTRITION RISK NOTIFICATION - TREATMENT: THE FOLLOWING DIET HAS BEEN RECOMMENDED
Recommendation:   Diet, Mechanical Soft:   Consistent Carbohydrate {No Snacks}  DASH/TLC {Sodium & Cholesterol Restricted}  Supplement Feeding Modality:  Oral  Glucerna Shake Cans or Servings Per Day:  1       Frequency:  Daily (12-16-20 @ 09:18) [Pending Verification By Attending]        
with patient

## 2020-12-16 NOTE — PROGRESS NOTE ADULT - PROBLEM SELECTOR PLAN 3
-hg trending low, anemia multifactorial, low baseline, iron deficiency, poor nutritional status and chronic diseases   -hg trending low, no acute sign or symptoms of bleeding   -CT abd and pelvis with no acute pathology   -transfuse 1 PRBC today - Hg went up 2 point 9.1 today   -f/u FOBT   -GI Dr. Garza consulted : EGD/ Colonoscopy tomorrow, MNNPO and bowel prep

## 2020-12-16 NOTE — PROGRESS NOTE ADULT - SUBJECTIVE AND OBJECTIVE BOX
CHIEF COMPLAINT:Patient is a 88y old  Female who presents with a chief complaint of Shortness of Breath.Pt appears comfortable.    	  REVIEW OF SYSTEMS:    [x ] Unable to obtain    PHYSICAL EXAM:  T(C): 36.7 (12-16-20 @ 05:38), Max: 36.9 (12-15-20 @ 17:25)  HR: 83 (12-16-20 @ 05:38) (75 - 96)  BP: 168/68 (12-16-20 @ 05:38) (125/80 - 171/68)  RR: 18 (12-16-20 @ 05:38) (18 - 18)  SpO2: 100% (12-16-20 @ 05:38) (94% - 100%)        Appearance: Normal	  HEENT:   Normal oral mucosa, PERRL, EOMI	  Lymphatic: No lymphadenopathy  Cardiovascular: Normal S1 S2, No JVD, No murmurs, No edema  Respiratory: Lungs clear to auscultation	  Gastrointestinal:  Soft, Non-tender, + BS	  Skin: No rashes, No ecchymoses, No cyanosis	  Extremities: Normal range of motion, No clubbing, cyanosis or edema  Vascular: Peripheral pulses palpable 2+ bilaterally    MEDICATIONS  (STANDING):  aspirin  chewable 81 milliGRAM(s) Oral daily  atorvastatin 40 milliGRAM(s) Oral at bedtime  calcium carbonate 1250 mG  + Vitamin D (OsCal 500 + D) 1 Tablet(s) Oral daily  donepezil 5 milliGRAM(s) Oral at bedtime  furosemide    Tablet 20 milliGRAM(s) Oral daily  insulin lispro (ADMELOG) corrective regimen sliding scale   SubCutaneous three times a day before meals  losartan 25 milliGRAM(s) Oral daily  memantine 10 milliGRAM(s) Oral daily  metoprolol tartrate 50 milliGRAM(s) Oral two times a day  senna 2 Tablet(s) Oral at bedtime    	  	  LABS:	 	                       9.1    7.96  )-----------( 187      ( 16 Dec 2020 07:45 )             30.0     12-16    133<L>  |  101  |  18  ----------------------------<  149<H>  4.5   |  23  |  0.60    Ca    8.6      16 Dec 2020 07:45  Mg     1.7     12-16    TPro  6.0  /  Alb  1.8<L>  /  TBili  0.6  /  DBili  x   /  AST  32  /  ALT  23  /  AlkPhos  301<H>  12-16    proBNP: Serum Pro-Brain Natriuretic Peptide: 96675 pg/mL (12-11 @ 03:43)    TSH: Thyroid Stimulating Hormone, Serum: 3.71 uU/mL (12-12 @ 07:21)

## 2020-12-16 NOTE — DIETITIAN INITIAL EVALUATION ADULT. - REASON FOR ADMISSION
in setting of COVID pneumonia , elderly ,  now in sinus rhythm on cardizem , start him eliquis 2.5 mg po BID , check TSH MAG , echocardiogram
Shortness of Breath

## 2020-12-16 NOTE — PROGRESS NOTE ADULT - ASSESSMENT
1. Iron deficiency anemia  2. No evidence of acute GI bleeding  3. R/o chronic GI bleeding  4. R/o Colorectal cancer  5. R/o Peptic ulcer disease  6. R/o AVM's    Suggestions:    1. Monitor H/H  2. Transfuse PRBC as needed  3. Check stool for occult blood x 3  4. Avoid NSAID  5. Protonix 40mg daily  6. EGD and colonoscopy  7. Check CEA  8. DVT prophylaxis

## 2020-12-16 NOTE — DIETITIAN INITIAL EVALUATION ADULT. - PERTINENT LABORATORY DATA
12-16 Na133 mmol/L<L> Glu 149 mg/dL<H> K+ 4.5 mmol/L Cr  0.60 mg/dL BUN 18 mg/dL   12-12 Phos 3.7 mg/dL   12-16 Alb 1.8 g/dL<L>        12-12-20 @ 11:57 HgbA1C 6.8 [4.0 - 5.6]

## 2020-12-16 NOTE — DIETITIAN INITIAL EVALUATION ADULT. - PHYSCIAL ASSESSMENT
cachectic per MD; severe muscle wasting + subcutaneous fat loss observed/emaciated/debilitated skin intact

## 2020-12-16 NOTE — DIETITIAN INITIAL EVALUATION ADULT. - PERTINENT MEDS FT
MEDICATIONS  (STANDING):  aspirin  chewable 81 milliGRAM(s) Oral daily  atorvastatin 40 milliGRAM(s) Oral at bedtime  calcium carbonate 1250 mG  + Vitamin D (OsCal 500 + D) 1 Tablet(s) Oral daily  donepezil 5 milliGRAM(s) Oral at bedtime  furosemide    Tablet 20 milliGRAM(s) Oral daily  insulin lispro (ADMELOG) corrective regimen sliding scale   SubCutaneous three times a day before meals  losartan 25 milliGRAM(s) Oral daily  memantine 10 milliGRAM(s) Oral daily  metoprolol tartrate 50 milliGRAM(s) Oral two times a day  senna 2 Tablet(s) Oral at bedtime

## 2020-12-16 NOTE — DIETITIAN INITIAL EVALUATION ADULT. - FACTORS AFF FOOD INTAKE
acute on chronic comorbidities/change in mental status/Voodoo/ethnic/cultural/personal food preferences

## 2020-12-16 NOTE — PROGRESS NOTE ADULT - SUBJECTIVE AND OBJECTIVE BOX
[   ] ICU                                          [   ] CCU                                      [  X ] Medical Floor      Patient is an 88 year old female from home, with daughter with past medical history significant for HTN, DM, CHF, dementia, admitted with shortness of breath found to have iron deficiency anemia. Patient is poor historian due to dementia. Patient c/o intermitted, crampy abdominal pain associated with constipation.    Today patient appears comfortable with no new complaint. No abdominal pain, nausea, vomiting, hematemesis, hematochezia, melena, fever, chills, chest pain, SOB, cough, hematuria, dysuria or diarrhea reported.    PAIN MANAGEMENT:  Pain Scale:                0 /10  Pain Location:       Prior Colonoscopy:  No prior colonoscopy     PAST MEDICAL HISTORY  CHF  CAD  TIA  Myositis  Osteoarthritis  HTN    DM       PAST SURGICAL HISTORY  No significant past surgical history      Allergies    No Known Allergies    Intolerances  None    SOCIAL HISTORY  Advanced Directives:       [  ] Full Code       [ X ] DNR  Marital Status:         [  ] M      [ X ] S      [  ] D       [  ] W  Children:       [ X ] Yes      [  ] No  Occupation:        [  ] Employed       [ X ] Unemployed       [  ] Retired  Diet:       [ X ] Regular       [  ] PEG feeding          [  ] NG tube feeding  Drug Use:           [ X ] Patient denied          [  ] Yes  Alcohol:           [ X ] No             [  ] Yes (socially)         [  ] Yes (chronic)  Tobacco:           [  ] Yes           [ X ] No      FAMILY HISTORY  [ X ] Heart Disease            [ X ] Diabetes             [ X ] HTN             [  ] Colon Cancer             [  ] Stomach Cancer              [  ] Pancreatic Cancer        VITALS  Vital Signs Last 24 Hrs  T(C): 36.7 (16 Dec 2020 05:38), Max: 36.9 (15 Dec 2020 17:25)  T(F): 98 (16 Dec 2020 05:38), Max: 98.4 (15 Dec 2020 17:25)  HR: 83 (16 Dec 2020 05:38) (75 - 96)  BP: 168/68 (16 Dec 2020 05:38) (125/80 - 171/68)   RR: 18 (16 Dec 2020 05:38) (18 - 18)  SpO2: 100% (16 Dec 2020 05:38) (94% - 100%)       MEDICATIONS  (STANDING):  aspirin  chewable 81 milliGRAM(s) Oral daily  atorvastatin 40 milliGRAM(s) Oral at bedtime  bisacodyl 20 milliGRAM(s) Oral <User Schedule>  calcium carbonate 1250 mG  + Vitamin D (OsCal 500 + D) 1 Tablet(s) Oral daily  donepezil 5 milliGRAM(s) Oral at bedtime  furosemide    Tablet 20 milliGRAM(s) Oral daily  insulin lispro (ADMELOG) corrective regimen sliding scale   SubCutaneous three times a day before meals  losartan 25 milliGRAM(s) Oral two times a day  memantine 10 milliGRAM(s) Oral daily  metoprolol tartrate 50 milliGRAM(s) Oral two times a day  polyethylene glycol/electrolyte Solution. 4000 milliLiter(s) Oral once  senna 2 Tablet(s) Oral at bedtime    MEDICATIONS  (PRN):                            9.1    7.96  )-----------( 187      ( 16 Dec 2020 07:45 )             30.0       12-16    133<L>  |  101  |  18  ----------------------------<  149<H>  4.5   |  23  |  0.60    Ca    8.6      16 Dec 2020 07:45  Mg     1.7     12-16    TPro  6.0  /  Alb  1.8<L>  /  TBili  0.6  /  DBili  x   /  AST  32  /  ALT  23  /  AlkPhos  301<H>  12-16

## 2020-12-16 NOTE — PROGRESS NOTE ADULT - SUBJECTIVE AND OBJECTIVE BOX
NP Note discussed with  Primary Attending    Patient is a 88y old  Female who presents with a chief complaint of Shortness of Breath (16 Dec 2020 11:12)    HPI- Patient is an 88 year old female with PMHx of dementia, HTN, DM, CHFpEF, presenting with shortness of breath. WIth findings of pulmonary congestion, BNP and trop elevation, D-dimer elevation, CT negative for PE.  Pt. admitted to medicine for acute on chronic HF, followed by card Dr. Montes, on Lasix diuresis with good effect.  Pt. evaluated by PT, recommended KURT however pt. and family decline KURT and wish to discharge to home.  Pt.'s daughter is CHRISTOPHER.  Hospital course complicated with worsening anemia requiring blood transfusion, GI Dr. Garaz consulted, will go for EGD/ Colonoscopy tomorrow       INTERVAL HPI/OVERNIGHT EVENTS: no new complaints    MEDICATIONS  (STANDING):  aspirin  chewable 81 milliGRAM(s) Oral daily  atorvastatin 40 milliGRAM(s) Oral at bedtime  bisacodyl 20 milliGRAM(s) Oral <User Schedule>  calcium carbonate 1250 mG  + Vitamin D (OsCal 500 + D) 1 Tablet(s) Oral daily  donepezil 5 milliGRAM(s) Oral at bedtime  furosemide    Tablet 20 milliGRAM(s) Oral daily  insulin lispro (ADMELOG) corrective regimen sliding scale   SubCutaneous three times a day before meals  losartan 25 milliGRAM(s) Oral two times a day  memantine 10 milliGRAM(s) Oral daily  metoprolol tartrate 50 milliGRAM(s) Oral two times a day  polyethylene glycol/electrolyte Solution. 4000 milliLiter(s) Oral once  senna 2 Tablet(s) Oral at bedtime    MEDICATIONS  (PRN):      __________________________________________________  REVIEW OF SYSTEMS:    CONSTITUTIONAL: No fever,   EYES: no acute visual disturbances  NECK: No pain or stiffness  RESPIRATORY: No cough; No shortness of breath  CARDIOVASCULAR: No chest pain, no palpitations  GASTROINTESTINAL: No pain. No nausea or vomiting; No diarrhea   NEUROLOGICAL: No headache or numbness, no tremors  MUSCULOSKELETAL: No joint pain, no muscle pain  GENITOURINARY: no dysuria, no frequency, no hesitancy  PSYCHIATRY: no depression , no anxiety  ALL OTHER  ROS negative        Vital Signs Last 24 Hrs  T(C): 36.7 (16 Dec 2020 05:38), Max: 36.9 (15 Dec 2020 17:25)  T(F): 98 (16 Dec 2020 05:38), Max: 98.4 (15 Dec 2020 17:25)  HR: 83 (16 Dec 2020 05:38) (75 - 96)  BP: 168/68 (16 Dec 2020 05:38) (125/80 - 171/68)  BP(mean): --  RR: 18 (16 Dec 2020 05:38) (18 - 18)  SpO2: 100% (16 Dec 2020 05:38) (94% - 100%)    ________________________________________________  PHYSICAL EXAM:  GENERAL: NAD  HEENT: Normocephalic;  conjunctivae and sclerae clear; moist mucous membranes;   NECK : supple  CHEST/LUNG: Clear to auscultation bilaterally with good air entry   HEART: S1 S2  regular; no murmurs, gallops or rubs  ABDOMEN: Soft, Nontender, Nondistended; Bowel sounds present  EXTREMITIES: no cyanosis; no edema; no calf tenderness  SKIN: warm and dry; no rash  NERVOUS SYSTEM:  Awake and alert; Oriented  to place, person, pleasantly forgetful ; no new deficits    _________________________________________________  LABS:                        9.1    7.96  )-----------( 187      ( 16 Dec 2020 07:45 )             30.0     12-16    133<L>  |  101  |  18  ----------------------------<  149<H>  4.5   |  23  |  0.60    Ca    8.6      16 Dec 2020 07:45  Mg     1.7     12-16    TPro  6.0  /  Alb  1.8<L>  /  TBili  0.6  /  DBili  x   /  AST  32  /  ALT  23  /  AlkPhos  301<H>  12-16        CAPILLARY BLOOD GLUCOSE      POCT Blood Glucose.: 174 mg/dL (16 Dec 2020 07:52)  POCT Blood Glucose.: 165 mg/dL (15 Dec 2020 22:27)  POCT Blood Glucose.: 157 mg/dL (15 Dec 2020 21:47)  POCT Blood Glucose.: 233 mg/dL (15 Dec 2020 16:38)  POCT Blood Glucose.: 230 mg/dL (15 Dec 2020 13:19)        RADIOLOGY & ADDITIONAL TESTS:  < from: US Hepatic & Pancreatic (12.11.20 @ 15:16) >    EXAM:  US LIVER AND PANCREAS                            PROCEDURE DATE:  12/11/2020          INTERPRETATION:  CLINICAL STATEMENT: Rule out gallstone..  Abdominal pain.    TECHNIQUE: Ultrasound of the right upper quadrant.    COMPARISON: CT head andpelvis 12/11/2020    FINDINGS:  Liver measures 18 cm in length    Trace ascites and pleural effusion noted    There is no gallstone or gallbladder wall thickening. Nonspecific trace cholecystic fluid noted    The common bile duct is not dilated measuring 4 mm.    The pancreas is not well seen.    The right kidney measures 9 cm in length.  There is no hydronephrosis. Small right renal cyst noted    IMPRESSION:  Nonspecific trace pericholecystic fluid.    Enlarged liver            ARLET MENDOZA MD; Attending Radiologist  This document has been electronically signed. Dec 11 2020  3:45PM    < end of copied text >  < from: US Duplex Venous Lower Ext Complete, Bilateral (12.11.20 @ 14:37) >    EXAM:  US DPLX LWR EXT VEINS COMPL BI                            PROCEDURE DATE:  12/11/2020          INTERPRETATION:  CLINICAL INFORMATION: Leg swelling    COMPARISON: None available.    TECHNIQUE: Duplex sonography of the BILATERAL LOWER extremity veins with color and spectral Doppler, with and without compression.    FINDINGS:    There is normal compressibility of the bilateral common femoral, femoral and popliteal veins.  Doppler examination shows normal spontaneous and phasic flow.    No calf vein thrombosis is detected.    There is a left popliteal fossa cyst measuring 1.7 x 0.8 cm.    IMPRESSION:  No evidence of deep venous thrombosis in either lower extremity.                DEBORAH ALLAN MD; Attending Radiologist  This document has been electronically signed. Dec 11 2020  2:39PM    < end of copied text >  < from: CT Abdomen and Pelvis w/ IV Cont (12.11.20 @ 09:58) >  EXAM:  CT ABDOMEN AND PELVIS IC                          EXAM:  CT ANGIO CHEST (W)AW IC                            PROCEDURE DATE:  12/11/2020          INTERPRETATION:  CLINICAL INDICATION: 88 years  Female with r/o pe.    COMPARISON: Chest CT 2/22/2015    PROCEDURE:  CT Angiography of the Chest was performed followed by portal venous phase imaging of the Abdomen and Pelvis.  IV Contrast: 90 ml of Omnipaque 350 was injected intravenously. 10 ml were discarded.  Oral contrast: None.  Sagittal and coronal reformats were performed as well as 3D (MIP) reconstructions.    LIMITATION: Absence of enteric contrast limits evaluation of the GI tract.    FINDINGS:  CHEST:  LUNGS AND LARGE AIRWAYS: Patent central airways. Bilateral lower lobe compressive atelectasis. Diffuse interlobular septal thickening. Right upper lobe groundglass opacities. 2.2 x 1.3 cm pleural-based lingular consolidation (65:80). Perivascular soft tissue surrounding the right lower lobe pulmonary veins.  PLEURA: Moderate bilateral pleural effusions.  VESSELS: No evidence of pulmonary embolism. 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 Descending thoracic aorta 2.3 cm. No aortic dissection.  HEART: Moderate cardiomegaly. Mitral annular calcification.No pericardial effusion.  MEDIASTINUM AND LETY: Right hilar adenopathy measuring up to 1.9 cm in short axis. Left hilar adenopathy measuring up to 1.1 cm in short axis. Right lower paratracheal lymph node measuring 1.3 cm in short axis with central calcification. Prevascular lymph nodes measuring up to 1.2 cm in short axis.  CHEST WALL AND LOWER NECK: Within normal limits.    ABDOMEN AND PELVIS:  LIVER: Within normal limits.  BILE DUCTS: Normal caliber.  GALLBLADDER: Trace pericholecystic fluid.  SPLEEN: Within normal limits.  PANCREAS: Within normal limits.  ADRENALS: Within normal limits.  KIDNEYS/URETERS: Bilateral renal hypodensities too small to characterize. 1.8 cm right lower pole renal cyst. Right upper pole renal cortical scarring.    BLADDER: Incompletely distended bladder with diffuse wall thickening.  REPRODUCTIVE ORGANS: Mildly enlarged multi fibroid uterus.    BOWEL: No bowel obstruction. Appendix is not visualized and cannot be assessed.  PERITONEUM: No ascites.  VESSELS: Atherosclerotic aorta without aneurysm.  RETROPERITONEUM/LYMPH NODES: Prominent perigastric lymph nodes measuring up to 8 mm in short axis. No other lymphadenopathy.  ABDOMINAL WALL: Small fat-containing umbilical hernia.  BONES: Mild degenerative changes. Grade 1 L4-5 anterolisthesis    IMPRESSION:    Absence of enteric contrast limits evaluation of the GI tract.    No evidence of pulmonary embolism.    Moderate bilateral pleural effusions with underlying compressive atelectasis lower lobes. Mild pulmonary edema. Right upper lobe groundglass opacity may be on the basis of pulmonary edema or pneumonia.    Soft tissue extending along the right lower lobe pulmonary veins may represent infiltrate or tumor.    Mediastinal and hilar adenopathy secondary to neoplasm or infection.    Cardiomegaly. Ectatic ascending aorta at 3.2 cm.    Trace pericholecystic fluid. Recommend abdominal ultrasound to evaluate for acute cholecystitis.    Nonspecific prominent perigastric lymph nodes.              NORBERTO PERALTA; Attending Radiologist  This document has been electronically signed. Dec 11 2020 11:46AM    < end of copied text >  < from: CT Angio Chest w/ IV Cont (12.11.20 @ 09:58) >  EXAM:  CT ABDOMEN AND PELVIS IC                          EXAM:  CT ANGIO CHEST (W)AW IC                            PROCEDURE DATE:  12/11/2020          INTERPRETATION:  CLINICAL INDICATION: 88 years  Female with r/o pe.    COMPARISON: Chest CT 2/22/2015    PROCEDURE:  CT Angiography of the Chest was performed followed by portal venous phase imaging of the Abdomen and Pelvis.  IV Contrast: 90 ml of Omnipaque 350 was injected intravenously. 10 ml were discarded.  Oral contrast: None.  Sagittal and coronal reformats were performed as well as 3D (MIP) reconstructions.    LIMITATION: Absence of enteric contrast limits evaluation of the GI tract.    FINDINGS:  CHEST:  LUNGS AND LARGE AIRWAYS: Patent central airways. Bilateral lower lobe compressive atelectasis. Diffuse interlobular septal thickening. Right upper lobe groundglass opacities. 2.2 x 1.3 cm pleural-based lingular consolidation (65:80). Perivascular soft tissue surrounding the right lower lobe pulmonary veins.  PLEURA: Moderate bilateral pleural effusions.  VESSELS: No evidence of pulmonary embolism. 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 Descending thoracic aorta 2.3 cm. No aortic dissection.  HEART: Moderate cardiomegaly. Mitral annular calcification.No pericardial effusion.  MEDIASTINUM AND LETY: Right hilar adenopathy measuring up to 1.9 cm in short axis. Left hilar adenopathy measuring up to 1.1 cm in short axis. Right lower paratracheal lymph node measuring 1.3 cm in short axis with central calcification. Prevascular lymph nodes measuring up to 1.2 cm in short axis.  CHEST WALL AND LOWER NECK: Within normal limits.    ABDOMEN AND PELVIS:  LIVER: Within normal limits.  BILE DUCTS: Normal caliber.  GALLBLADDER: Trace pericholecystic fluid.  SPLEEN: Within normal limits.  PANCREAS: Within normal limits.  ADRENALS: Within normal limits.  KIDNEYS/URETERS: Bilateral renal hypodensities too small to characterize. 1.8 cm right lower pole renal cyst. Right upper pole renal cortical scarring.    BLADDER: Incompletely distended bladder with diffuse wall thickening.  REPRODUCTIVE ORGANS: Mildly enlarged multi fibroid uterus.    BOWEL: No bowel obstruction. Appendix is not visualized and cannot be assessed.  PERITONEUM: No ascites.  VESSELS: Atherosclerotic aorta without aneurysm.  RETROPERITONEUM/LYMPH NODES: Prominent perigastric lymph nodes measuring up to 8 mm in short axis. No other lymphadenopathy.  ABDOMINAL WALL: Small fat-containing umbilical hernia.  BONES: Mild degenerative changes. Grade 1 L4-5 anterolisthesis    IMPRESSION:    Absence of enteric contrast limits evaluation of the GI tract.    No evidence of pulmonary embolism.    Moderate bilateral pleural effusions with underlying compressive atelectasis lower lobes. Mild pulmonary edema. Right upper lobe groundglass opacity may be on the basis of pulmonary edema or pneumonia.    Soft tissue extending along the right lower lobe pulmonary veins may represent infiltrate or tumor.    Mediastinal and hilar adenopathy secondary to neoplasm or infection.    Cardiomegaly. Ectatic ascending aorta at 3.2 cm.    Trace pericholecystic fluid. Recommend abdominal ultrasound to evaluate for acute cholecystitis.    Nonspecific prominent perigastric lymph nodes.              NORBERTO PERALTA; Attending Radiologist  This document has been electronically signed. Dec 11 2020 11:46AM    < end of copied text >  < from: CT Head No Cont (12.11.20 @ 09:58) >    EXAM:  CT BRAIN                            PROCEDURE DATE:  12/11/2020          INTERPRETATION:  HISTORY: Altered mental status    Comparison 08/19/18    Evaluation demonstrates no evidence of mass-effect or midline shift. No intraparenchymal mass lesions or hemorrhage is identified. There is mild left medial temporal lobe atrophy. There is no evidence of hydrocephalus. No extra-axial collections are noted.    The bone windows demonstrate no gross osseous abnormalities. Incidental note is made of a tiny right parietal convexity densely calcified meningioma.    Impression:  1. No acute findings.                    EVELIA LUEVANO MD; Attending Radiologist  This document has been electronically signed. Dec 11 2020  9:54AM    < end of copied text >    Imaging Personally Reviewed:  YES    Consultant(s) Notes Reviewed:   YES    Care Discussed with Consultants : cardiology/ GI     Plan of care was discussed with patient and /or primary care giver; all questions and concerns were addressed and care was aligned with patient's wishes.

## 2020-12-16 NOTE — PROGRESS NOTE ADULT - ASSESSMENT
88 year old female from home, with daughter as HHA, with PMHx of HTN, DM, CHFpEF, dementia, presenting with chief complaint of shortness of breath,pulmonary edema-acute diastolic HF,anemia,Type II MI due to demand ischemia..  1.Anemia-Iron,s/p PRBC.  2.Cont asa,b blocker.  3.Acute diastolic HF-lasix 20mg po qd.  4.DM-Insulin.  5.HTN-b blocker,inc cozaar 25mg bid.  6.Dementia-aricept,namenda.

## 2020-12-16 NOTE — DIETITIAN INITIAL EVALUATION ADULT. - OTHER INFO
Pt lives home with family/HHA PTA, alert, verbally responsive, confused with dementia; Attempt made to contact family ( daughter Cordell Lux at [phone]), not available at present; Severe protein-calorie malnutrition likely due to chronic diseases and dementia per MD ( [facility] note); wt data from [place] EMR reviewed: 128.8 lb 10/8/2019-->97.8 lb 12/12/2020, wt loss noted ( see below); feeding self after tray setup, tolerating 90% breakfast observed today

## 2020-12-16 NOTE — DIETITIAN INITIAL EVALUATION ADULT. - DIET TYPE
Add Glucerna Shake 1can bid as medically feasible (440kcal, 20g protein) Add Glucerna Shake 1can daily as medically feasible ( 220 kcal, 10g protein)

## 2020-12-17 NOTE — PROGRESS NOTE ADULT - PROBLEM SELECTOR PLAN 8
-IMPROVE VTE score 4  -dvt ppx- hold chemo ppx given anemia, cont SCDs DNR/ DNI   MOLST in the chart

## 2020-12-17 NOTE — DISCHARGE NOTE PROVIDER - NSDCCPCAREPLAN_GEN_ALL_CORE_FT
PRINCIPAL DISCHARGE DIAGNOSIS  Diagnosis: CHF (congestive heart failure)  Assessment and Plan of Treatment:   WHAT YOU NEED TO KNOW:  Heart failure (HF) is a condition that does not allow your heart to fill or pump properly. Not enough oxygen in your blood gets to your organs and tissues. Fluid may not move through your body properly. Fluid builds up and causes swelling and difficulty breathing. This is known as congestive heart failure. HF may start in the left or right ventricle. HF is often caused by damage or injury to your heart. The damage may be caused by other heart problems, diabetes, or high blood pressure. The damage may have also been caused by an infection. HF is a long-term condition that tends to get worse over time. It is important to manage your health to improve your quality of life  Call your doctor if:   •Your heartbeat is fast, slow, or uneven all the time.  •You have symptoms of worsening HF: ?Shortness of breath at rest, at night, or that is getting worse in any way  ?Weight gain of 3 or more pounds (1.4 kg) in a day, or more than your healthcare provider says is okay  ?More swelling in your legs or ankles  ?Abdominal pain or swelling  ?More coughing  ?Loss of appetite  ?Feeling tired all the time  •You have questions or concerns about your condition or care.  Take your medications as directed, please follow-up with cardiology      SECONDARY DISCHARGE DIAGNOSES  Diagnosis: Anemia  Assessment and Plan of Treatment: Anemia    Diagnosis: HTN (hypertension)  Assessment and Plan of Treatment:      PRINCIPAL DISCHARGE DIAGNOSIS  Diagnosis: CHF (congestive heart failure)  Assessment and Plan of Treatment: WHAT YOU NEED TO KNOW:  Heart failure (HF) is a condition that does not allow your heart to fill or pump properly. Not enough oxygen in your blood gets to your organs and tissues. Fluid may not move through your body properly. Fluid builds up and causes swelling and difficulty breathing. This is known as congestive heart failure. HF may start in the left or right ventricle. HF is often caused by damage or injury to your heart. The damage may be caused by other heart problems, diabetes, or high blood pressure. The damage may have also been caused by an infection. HF is a long-term condition that tends to get worse over time. It is important to manage your health to improve your quality of life  Call your doctor if:   Your heartbeat is fast, slow, or uneven all the time.  You have symptoms of worsening HF: ?Shortness of breath at rest, at night, or that is getting worse in any way  Weight gain of 3 or more pounds (1.4 kg) in a day, or more than your healthcare provider says is okay  More swelling in your legs or ankles  Abdominal pain or swelling  More coughing  Loss of appetite  Feeling tired all the time  •You have questions or concerns about your condition or care.  Take your medications as directed, please follow up with cardiology      SECONDARY DISCHARGE DIAGNOSES  Diagnosis: Anemia  Assessment and Plan of Treatment: Anemia  WHAT YOU NEED TO KNOW:  Anemia is a low number of red blood cells or a low amount of hemoglobin in your red blood cells. Hemoglobin is a protein that helps carry oxygen throughout your body. Red blood cells use iron to create hemoglobin. Anemia may develop if your body does not have enough iron. It may also develop if your body does not make enough red blood cells or they die faster than your body can make them.   Medicines:   •Iron or folic acid supplements help increase your red blood cell and hemoglobin levels.   Prevent anemia: Eat healthy foods rich in iron and vitamin C. Nuts, meat, dark leafy green vegetables, and beans are high in iron and protein. Vitamin C helps your body absorb iron. Foods rich in vitamin C include oranges and other citrus fruits. Ask your healthcare provider for a list of other foods that are high in iron or vitamin C. Ask if you need to be on a special diet.   Follow up with your healthcare provider as directed: Write down your questions so you remember to ask them during your visits.      Diagnosis: Diabetes  Assessment and Plan of Treatment: Diabetes  What you can do to manage your blood sugar levels:   Make healthy food choices. Healthy foods can give you energy to learn and be active. Healthy foods can also help you keep your blood sugar in balance, and manage or lose weight safely. Work with a dietitian to develop a meal plan that works for you and your schedule. A dietitian can help you learn how to eat the right amount of carbohydrates during your meals and snacks. Carbohydrates can raise your blood sugar if you eat too many at one time. Some foods that contain carbohydrates include breads, cereals, rice, pasta, and sweets  Take your diabetes medicine or insulin as directed. You may need diabetes medicine, insulin, or both to help control your blood sugar levels.  Please follow up with your primary care provider       Diagnosis: HTN (hypertension)  Assessment and Plan of Treatment:   WHAT YOU NEED TO KNOW:  Hypertension is high blood pressure. Your blood pressure is the force of your blood moving against the walls of your arteries. Hypertension causes your blood pressure to get so high that your heart has to work much harder than normal. This can damage your heart. Even if you have hypertension for years, lifestyle changes, medicines, or both can help bring your blood pressure to normal.  Medicines: You may need any of the following:   Antihypertensives may be used to help lower your blood pressure. Several kinds of medicines are available. Your healthcare provider may change the medicine or medicines you currently take. This may be needed if your blood pressure is often high when you check it at home or you are having other problems with blood pressure control.  Limit sodium (salt) as directed. Too much sodium can affect your fluid balance. Check labels to find low-sodium or no-salt-added foods. Some low-sodium foods use potassium salts for flavor. Too much potassium can also cause health problems. Your healthcare provider will tell you how much sodium and potassium are safe for you to have in a day. He or she may recommend that you limit sodium to 2,300 mg a day.       PRINCIPAL DISCHARGE DIAGNOSIS  Diagnosis: CHF (congestive heart failure)  Assessment and Plan of Treatment: WHAT YOU NEED TO KNOW:  Heart failure (HF) is a condition that does not allow your heart to fill or pump properly. Not enough oxygen in your blood gets to your organs and tissues. Fluid may not move through your body properly. Fluid builds up and causes swelling and difficulty breathing. This is known as congestive heart failure. HF may start in the left or right ventricle. HF is often caused by damage or injury to your heart. The damage may be caused by other heart problems, diabetes, or high blood pressure. The damage may have also been caused by an infection. HF is a long-term condition that tends to get worse over time. It is important to manage your health to improve your quality of life  Call your doctor if:   Your heartbeat is fast, slow, or uneven all the time.  You have symptoms of worsening HF: ?Shortness of breath at rest, at night, or that is getting worse in any way  Weight gain of 3 or more pounds (1.4 kg) in a day, or more than your healthcare provider says is okay  More swelling in your legs or ankles  Abdominal pain or swelling  More coughing  Loss of appetite  Feeling tired all the time  •You have questions or concerns about your condition or care.  Take your medications as directed, please follow up with cardiology      SECONDARY DISCHARGE DIAGNOSES  Diagnosis: Hemorrhoids  Assessment and Plan of Treatment:   WHAT YOU NEED TO KNOW:  Hemorrhoids are swollen blood vessels inside your rectum (internal hemorrhoids) or on your anus (external hemorrhoids). Sometimes a hemorrhoid may prolapse. This means it extends out of your anus.  DISCHARGE INSTRUCTIONS:  Seek care immediately if:   You have severe pain in your rectum or around your anus.  You have severe pain in your abdomen and you are vomiting.   You have bleeding from your anus that soaks through your underwear.   Contact your healthcare provider if:   You have frequent and painful bowel movements.  Your hemorrhoid looks or feels more swollen than usual.   You do not have a bowel movement for 2 days or more.   You see or feel tissue coming through your anus.   You have questions or concerns about your condition or care  Medicines: You may need any of the following:   Stool softeners help treat or prevent constipation  Prevent hemorrhoids:   Do not strain to have a bowel movement. Do not sit on the toilet too long. These actions can increase pressure on the tissues in your rectum and anus.   Drink plenty of liquids. Liquids can help prevent constipation. Ask how much liquid to drink each day and which liquids are best for you.   Eat a variety of high-fiber foods. Examples include fruits, vegetables, and whole grains. Ask your healthcare   Hemorrhoids may cause bleeding, if you notice bleeding please follow up with your primary care doctor.       Diagnosis: Gastritis  Assessment and Plan of Treatment:   WHAT YOU NEED TO KNOW:  Gastritis is inflammation or irritation of the lining of your stomach.   Seek care immediately if:   You vomit blood.  You have black or bloody bowel movements.  You have severe stomach or back pain.  Contact your healthcare provider if:   You have a fever.  You have new or worsening symptoms, even after treatment.  You have questions or concerns about your condition or care  Manage or prevent gastritis:   Do not smoke. Nicotine and other chemicals in cigarettes and cigars can make your symptoms worse and cause lung damage. Ask your healthcare provider for information if you currently smoke and need help to quit. E-cigarettes or smokeless tobacco still contain nicotine. Talk to your healthcare provider before you use these products.   Do not drink alcohol. Alcohol can prevent healing and make your gastritis worse. Talk to your healthcare provider if you need help to stop drinking.  Do not take NSAIDs or aspirin unless directed. These and similar medicines can cause irritation. If your healthcare provider says it is okay to take NSAIDs, take them with food.   Do not eat foods that cause irritation. Foods such as oranges and salsa can cause burning or pain. Eat a variety of healthy foods. Examples include fruits (not citrus), vegetables, low-fat dairy products, beans, whole-grain breads, and lean meats and fish. Try to eat small meals, and drink water with your meals. Do not eat for at least 3 hours before you go to bed.  Find ways to relax and decrease stress. Stress can increase stomach acid and make gastritis worse. Activities such as yoga, meditation, or listening to music can help you relax. Spend time with friends, or do things you enjoy.  Your EGD showed gastritis, you are being started on a proton pump inhibitor called Protonix. Please follow-up with gastroenterology    Diagnosis: Anemia  Assessment and Plan of Treatment: Anemia  WHAT YOU NEED TO KNOW:  Anemia is a low number of red blood cells or a low amount of hemoglobin in your red blood cells. Hemoglobin is a protein that helps carry oxygen throughout your body. Red blood cells use iron to create hemoglobin. Anemia may develop if your body does not have enough iron. It may also develop if your body does not make enough red blood cells or they die faster than your body can make them.   Medicines:   •Iron or folic acid supplements help increase your red blood cell and hemoglobin levels.   Prevent anemia: Eat healthy foods rich in iron and vitamin C. Nuts, meat, dark leafy green vegetables, and beans are high in iron and protein. Vitamin C helps your body absorb iron. Foods rich in vitamin C include oranges and other citrus fruits. Ask your healthcare provider for a list of other foods that are high in iron or vitamin C. Ask if you need to be on a special diet.   Follow up with your healthcare provider as directed: Write down your questions so you remember to ask them during your visits.      Diagnosis: Diabetes  Assessment and Plan of Treatment: Diabetes  What you can do to manage your blood sugar levels:   Make healthy food choices. Healthy foods can give you energy to learn and be active. Healthy foods can also help you keep your blood sugar in balance, and manage or lose weight safely. Work with a dietitian to develop a meal plan that works for you and your schedule. A dietitian can help you learn how to eat the right amount of carbohydrates during your meals and snacks. Carbohydrates can raise your blood sugar if you eat too many at one time. Some foods that contain carbohydrates include breads, cereals, rice, pasta, and sweets  Take your diabetes medicine or insulin as directed. You may need diabetes medicine, insulin, or both to help control your blood sugar levels.  Please follow up with your primary care provider       Diagnosis: HTN (hypertension)  Assessment and Plan of Treatment:   WHAT YOU NEED TO KNOW:  Hypertension is high blood pressure. Your blood pressure is the force of your blood moving against the walls of your arteries. Hypertension causes your blood pressure to get so high that your heart has to work much harder than normal. This can damage your heart. Even if you have hypertension for years, lifestyle changes, medicines, or both can help bring your blood pressure to normal.  Medicines: You may need any of the following:   Antihypertensives may be used to help lower your blood pressure. Several kinds of medicines are available. Your healthcare provider may change the medicine or medicines you currently take. This may be needed if your blood pressure is often high when you check it at home or you are having other problems with blood pressure control.  Limit sodium (salt) as directed. Too much sodium can affect your fluid balance. Check labels to find low-sodium or no-salt-added foods. Some low-sodium foods use potassium salts for flavor. Too much potassium can also cause health problems. Your healthcare provider will tell you how much sodium and potassium are safe for you to have in a day. He or she may recommend that you limit sodium to 2,300 mg a day.       PRINCIPAL DISCHARGE DIAGNOSIS  Diagnosis: CHF (congestive heart failure)  Assessment and Plan of Treatment: WHAT YOU NEED TO KNOW:  Heart failure (HF) is a condition that does not allow your heart to fill or pump properly. Not enough oxygen in your blood gets to your organs and tissues. Fluid may not move through your body properly. Fluid builds up and causes swelling and difficulty breathing. This is known as congestive heart failure. HF may start in the left or right ventricle. HF is often caused by damage or injury to your heart. The damage may be caused by other heart problems, diabetes, or high blood pressure. The damage may have also been caused by an infection. HF is a long-term condition that tends to get worse over time. It is important to manage your health to improve your quality of life  Call your doctor if:   Your heartbeat is fast, slow, or uneven all the time.  You have symptoms of worsening HF: ?Shortness of breath at rest, at night, or that is getting worse in any way  Weight gain of 3 or more pounds (1.4 kg) in a day, or more than your healthcare provider says is okay  More swelling in your legs or ankles  Abdominal pain or swelling  More coughing  Loss of appetite  Feeling tired all the time  You have questions or concerns about your condition or care.  Take your medications Lasix as directed, please follow up with cardiology      SECONDARY DISCHARGE DIAGNOSES  Diagnosis: Hemorrhoids  Assessment and Plan of Treatment: WHAT YOU NEED TO KNOW:  Hemorrhoids are swollen blood vessels inside your rectum (internal hemorrhoids) or on your anus (external hemorrhoids). Sometimes a hemorrhoid may prolapse. This means it extends out of your anus.  DISCHARGE INSTRUCTIONS:  Seek care immediately if:   You have severe pain in your rectum or around your anus.  You have severe pain in your abdomen and you are vomiting.   You have bleeding from your anus that soaks through your underwear.   Contact your healthcare provider if:   You have frequent and painful bowel movements.  Your hemorrhoid looks or feels more swollen than usual.   You do not have a bowel movement for 2 days or more.   You see or feel tissue coming through your anus.   You have questions or concerns about your condition or care  Medicines: You may need any of the following:   Stool softeners help treat or prevent constipation  Prevent hemorrhoids:   Do not strain to have a bowel movement. Do not sit on the toilet too long. These actions can increase pressure on the tissues in your rectum and anus.   Drink plenty of liquids. Liquids can help prevent constipation. Ask how much liquid to drink each day and which liquids are best for you.   Eat a variety of high-fiber foods. Examples include fruits, vegetables, and whole grains. Ask your healthcare   Hemorrhoids may cause bleeding, if you notice bleeding please follow up with your primary care doctor.       Diagnosis: Gastritis  Assessment and Plan of Treatment: WHAT YOU NEED TO KNOW:  Gastritis is inflammation or irritation of the lining of your stomach.   Seek care immediately if:   You vomit blood.  You have black or bloody bowel movements.  You have severe stomach or back pain.  Contact your healthcare provider if:   You have a fever.  You have new or worsening symptoms, even after treatment.  You have questions or concerns about your condition or care  Manage or prevent gastritis:   Do not smoke. Nicotine and other chemicals in cigarettes and cigars can make your symptoms worse and cause lung damage. Ask your healthcare provider for information if you currently smoke and need help to quit. E-cigarettes or smokeless tobacco still contain nicotine. Talk to your healthcare provider before you use these products.   Do not drink alcohol. Alcohol can prevent healing and make your gastritis worse. Talk to your healthcare provider if you need help to stop drinking.  Do not take NSAIDs or aspirin unless directed. These and similar medicines can cause irritation. If your healthcare provider says it is okay to take NSAIDs, take them with food.   Do not eat foods that cause irritation. Foods such as oranges and salsa can cause burning or pain. Eat a variety of healthy foods. Examples include fruits (not citrus), vegetables, low-fat dairy products, beans, whole-grain breads, and lean meats and fish. Try to eat small meals, and drink water with your meals. Do not eat for at least 3 hours before you go to bed.  Find ways to relax and decrease stress. Stress can increase stomach acid and make gastritis worse. Activities such as yoga, meditation, or listening to music can help you relax. Spend time with friends, or do things you enjoy.  Your EGD showed gastritis, you are being started on a proton pump inhibitor called Protonix. Please follow-up with gastroenterology    Diagnosis: Anemia  Assessment and Plan of Treatment: Anemia  WHAT YOU NEED TO KNOW:  Anemia is a low number of red blood cells or a low amount of hemoglobin in your red blood cells. Hemoglobin is a protein that helps carry oxygen throughout your body. Red blood cells use iron to create hemoglobin. Anemia may develop if your body does not have enough iron. It may also develop if your body does not make enough red blood cells or they die faster than your body can make them.   Medicines:   Iron or folic acid supplements help increase your red blood cell and hemoglobin levels.   Prevent anemia: Eat healthy foods rich in iron and vitamin C. Nuts, meat, dark leafy green vegetables, and beans are high in iron and protein. Vitamin C helps your body absorb iron. Foods rich in vitamin C include oranges and other citrus fruits. Ask your healthcare provider for a list of other foods that are high in iron or vitamin C. Ask if you need to be on a special diet.   Follow up with your healthcare provider as directed: Write down your questions so you remember to ask them during your visits.     You are being sent home on iron supplements which can make you constipated, take stool softeners as directed.       Diagnosis: Diabetes  Assessment and Plan of Treatment: Diabetes  What you can do to manage your blood sugar levels:   Make healthy food choices. Healthy foods can give you energy to learn and be active. Healthy foods can also help you keep your blood sugar in balance, and manage or lose weight safely. Work with a dietitian to develop a meal plan that works for you and your schedule. A dietitian can help you learn how to eat the right amount of carbohydrates during your meals and snacks. Carbohydrates can raise your blood sugar if you eat too many at one time. Some foods that contain carbohydrates include breads, cereals, rice, pasta, and sweets  Take your diabetes medicine or insulin as directed. You may need diabetes medicine, insulin, or both to help control your blood sugar levels.  Please follow up with your primary care provider       Diagnosis: HTN (hypertension)  Assessment and Plan of Treatment: WHAT YOU NEED TO KNOW:  Hypertension is high blood pressure. Your blood pressure is the force of your blood moving against the walls of your arteries. Hypertension causes your blood pressure to get so high that your heart has to work much harder than normal. This can damage your heart. Even if you have hypertension for years, lifestyle changes, medicines, or both can help bring your blood pressure to normal.  Medicines: You may need any of the following:   Antihypertensives may be used to help lower your blood pressure. Several kinds of medicines are available. Your healthcare provider may change the medicine or medicines you currently take. This may be needed if your blood pressure is often high when you check it at home or you are having other problems with blood pressure control.  Limit sodium (salt) as directed. Too much sodium can affect your fluid balance. Check labels to find low-sodium or no-salt-added foods. Some low-sodium foods use potassium salts for flavor. Too much potassium can also cause health problems. Your healthcare provider will tell you how much sodium and potassium are safe for you to have in a day. He or she may recommend that you limit sodium to 2,300 mg a day.  While in the hospital the cardiologist discontinued your amlodipine and your hydralazine, please start taking the Losartan, Metoprolol and Lasix as directed.

## 2020-12-17 NOTE — PROGRESS NOTE ADULT - SUBJECTIVE AND OBJECTIVE BOX
Colonoscopy Report  Indication: Iron deficiency anemia and GI bleeding  Referring:   Deondre Garza MD   Instrument:    Anesthesia: MAC  Consent:  Informed consent was obtained from the patient after providing any opportunity for questions  Procedure: After placing the patient in the left lateral decubitus position, the colonoscope was gently inserted into the rectum and advanced to the cecum. Color, texture, mucosa, and anatomy of the colon were carefully examined with the scope. The patient tolerated the procedure well. After completion of the exam, the patient was transferred to the recovery room.     Preparation:  Findings:   Anal Canal	Internal hemorrhoids  Rectum	Normal  Sigmoid Colon 	Few diverticula  Descending Colon	Normal  Splenic Flexure	Normal  Transverse Colon	Normal  Hepatic Flexure	Normal  Ascending Colon	Patchy mucosal erythema with ulceration  Cecum	   Mucosal erythema with ulceration (Bxed)  Ileo-cecal Valve	Normal  Ileum 	Not intubated     EBL:0    Impression:    1. Colitis with mucosal ulceration  2. Rare diverticulosis  3. Internal hemorrhoids    Plan:  1. Follow up path  2. Avoid NSAID  3. EGD      Procedure Start Time:  14:59  Cecum Reached Time:  15:05  Procedure End Time:   15:13  Total Withdrawal Time:  8 minutes      Attending:      Deondre Garza M.D.

## 2020-12-17 NOTE — PROGRESS NOTE ADULT - SUBJECTIVE AND OBJECTIVE BOX
Esophagogastroduodenoscopy Report  Indication:  Iron deficiency anemia and GI bleeding  Referring MD:    Deondre Garza MD  Instrument:  # [id number]  Anesthesia: MAC  Consent:  Informed consent was obtained from the patient after providing any opportunity for questions  Procedure: The gastroscope was gently passed through the incisoral orifice into the oral cavity and under direct visualization the esophagus was intubated. The endoscope was passed down the esophagus, through the stomach and into proximal jejunum. Color, texture, mucosa and anatomy of the esophagus, stomach, and duodenum were carefully examined with the scope. The patient tolerated the procedure well. After completion of the examination, the patient was transferred to the recovery room.   Preparation: NPO   Findings:   Oropharynx	Normal  Esophagus	Mild distal mucosal erythema  EG-junction	Hiatal hernia  Cardia	Normal.  Body	Normal   Antrum	Patchy mucosal erythema with 15mm white base ulcer (Bxed)  Pylorus	Normal.  Duodenal Bulb	Normal   2nd portion	Normal  3rd portion	Normal.     EBL:0    Impression:   1. Gastric ulcer  2. Gastritis  3. Esophagitis  4. Hiatal hernia    Plan:     1. Follow up path  2. Protonix 40mg daily  3. Avoid NSAID  4. Anti reflux measure  5. Treat for H. Pylori if positive      Procedure Start Time: 15:14  Procedure End Time:   15:17      Attending:      Deondre Garza M.D.

## 2020-12-17 NOTE — DISCHARGE NOTE PROVIDER - PROVIDER TOKENS
PROVIDER:[TOKEN:[[id number]],FOLLOWUP:[2 weeks]] PROVIDER:[TOKEN:[[id number]],FOLLOWUP:[2 weeks]],PROVIDER:[TOKEN:[[id number]],FOLLOWUP:[2 weeks]]

## 2020-12-17 NOTE — PROGRESS NOTE ADULT - PROBLEM SELECTOR PLAN 6
-likely due to chronic diseases and dementia   -supportive care -likely 2/2 chronic diseases and dementia/poor PO intake  - glucerna   -supportive care

## 2020-12-17 NOTE — DISCHARGE NOTE PROVIDER - NSDCMRMEDTOKEN_GEN_ALL_CORE_FT
amLODIPine 10 mg oral tablet: 1 tab(s) orally once a day  atorvastatin 40 mg oral tablet: 1 tab(s) orally once a day  Calcium 600+D oral tablet: 1 tab(s) orally 2 times a day  donepezil 5 mg oral tablet: 1 tab(s) orally once a day (at bedtime)  hydrALAZINE 25 mg oral tablet: 1 tab(s) orally 3 times a day  Janumet 50 mg-1000 mg oral tablet: 1 tab(s) orally 2 times a day  memantine 10 mg oral tablet: 1 tab(s) orally once a day  metoprolol tartrate 50 mg oral tablet: 1 tab(s) orally 2 times a day   aspirin 81 mg oral tablet, chewable: 1 tab(s) orally once a day  atorvastatin 40 mg oral tablet: 1 tab(s) orally once a day  Calcium 600+D oral tablet: 1 tab(s) orally 2 times a day  donepezil 5 mg oral tablet: 1 tab(s) orally once a day (at bedtime)  furosemide 20 mg oral tablet: 1 tab(s) orally once a day  Janumet 50 mg-1000 mg oral tablet: 1 tab(s) orally 2 times a day  losartan 25 mg oral tablet: 1 tab(s) orally 2 times a day  memantine 10 mg oral tablet: 1 tab(s) orally once a day  metoprolol tartrate 50 mg oral tablet: 1 tab(s) orally 2 times a day  pantoprazole 40 mg oral delayed release tablet: 1 tab(s) orally once a day (before a meal)  senna oral tablet: 2 tab(s) orally once a day (at bedtime)   aspirin 81 mg oral tablet, chewable: 1 tab(s) orally once a day  atorvastatin 40 mg oral tablet: 1 tab(s) orally once a day  Calcium 600+D oral tablet: 1 tab(s) orally 2 times a day  donepezil 5 mg oral tablet: 1 tab(s) orally once a day (at bedtime)  ferrous sulfate 325 mg (65 mg elemental iron) oral delayed release tablet: 1 tab(s) orally once a day   furosemide 20 mg oral tablet: 1 tab(s) orally once a day  Janumet 50 mg-1000 mg oral tablet: 1 tab(s) orally 2 times a day  losartan 25 mg oral tablet: 1 tab(s) orally 2 times a day  memantine 10 mg oral tablet: 1 tab(s) orally once a day  metoprolol tartrate 50 mg oral tablet: 1 tab(s) orally 2 times a day  pantoprazole 40 mg oral delayed release tablet: 1 tab(s) orally once a day (before a meal)  senna oral tablet: 2 tab(s) orally once a day (at bedtime)

## 2020-12-17 NOTE — PROGRESS NOTE ADULT - ASSESSMENT
88 year old female from home, with daughter as HHA, with PMHx of HTN, DM, CHFpEF, dementia, presenting with chief complaint of shortness of breath,pulmonary edema-acute diastolic HF,anemia,Type II MI due to demand ischemia..  1.Anemia-Iron,s/p PRBC.  2.Cont asa,b blocker.  3.Acute diastolic HF-lasix 20mg po qd.  4.DM-Insulin.  5.HTN-b blocker, cozaar 25mg bid.  6.Dementia-aricept,namenda.

## 2020-12-17 NOTE — PROGRESS NOTE ADULT - PROBLEM SELECTOR PLAN 3
-hg trending low, anemia multifactorial, low baseline, iron deficiency, poor nutritional status and chronic diseases   -hg trending low, no acute sign or symptoms of bleeding   -CT abd and pelvis with no acute pathology   -transfuse 1 PRBC today - Hg went up 2 point 9.1 today   -f/u FOBT   -GI Dr. Garza consulted : EGD/ Colonoscopy tomorrow, MNNPO and bowel prep - microcytic, likely multifactorial in the setting of iron deficiency and chronic disease given elevated TIBC, ferritin and low iron levels   -hemoglobin stable s/p 1 unit PRBCs and iron supplementation  -hemodynamically stable, no bleeding diathesis    -CT abd and pelvis with no acute pathology   -GI Dr. Garza consulted : EGD/ Colonoscopy today, NPO and bowel prep. f/u results

## 2020-12-17 NOTE — PROGRESS NOTE ADULT - PROBLEM SELECTOR PLAN 4
-on Janumet 50/1000 BID at home   -cont HSSC   -A1c- 6.8 -on Janumet 50/1000 BID at home   - ISS per protocol   -A1c- 6.8 -on Janumet 50/1000 BID at home   - ISS per protocol   - accuchecks AC and HS  -A1c- 6.8

## 2020-12-17 NOTE — DISCHARGE NOTE PROVIDER - HOSPITAL COURSE
Patient is an 88 year old female with a past medical history of HTN, DM, HFpEF (55%), who presented to the emergency department with c/o shortness of breath. She was found to have pulmonary vascular congestion, elevated BNP, D-Dimer elevation, and increased troponin/type II MI in the setting of demand ischemia. CT Angio negative for pulmonary embolism, CXR with bilateral pleural effusions suggestive of mild pulmonary edema. She was subsequently admitted for acute on chronic heart failure exacerbation and started on IV diuresis with good effect. Patient's hospital course complicated by anemia requiring 1 unit PRBC and iron sucrose - likely multi-factorial anemia in the setting of iron deficiency and chronic disease given elevated TIBC and ferritin levels with concomitant low iron levels. H/H now stable-  no melena, hematochezia or hematemesis observed while IP.  Pt is pending EGD/Colonoscopy today with Dr. Garza for evaluation of this anemia. Initial plan was discharge to [facility] per PT recommendations, however, daughter is a HHA and wishes to take the patient home.    Patient is an 88 year old female with a past medical history of HTN, DM, HFpEF (55%), who presented to the emergency department with c/o shortness of breath. She was found to have pulmonary vascular congestion, elevated BNP, D-Dimer elevation, and increased troponin/type II MI in the setting of demand ischemia. CT Angio negative for pulmonary embolism, CXR with bilateral pleural effusions suggestive of mild pulmonary edema. She was subsequently admitted for acute on chronic heart failure exacerbation and started on IV diuresis with good effect. Patient's hospital course complicated by anemia requiring 1 unit PRBC and iron sucrose - likely multi-factorial anemia in the setting of iron deficiency and chronic disease given elevated TIBC and ferritin levels with concomitant low iron levels. H/H now stable-  no melena, hematochezia or hematemesis observed while IP.  EGD/Colonoscopy today with Dr. Garza showed gastritis, ulcer, and hemorrhoids, started on PPI. Initial plan was discharge to [facility] per PT recommendations, however, daughter is a HHA and wishes to take the patient home.    Patient is an 88 year old female with a past medical history of HTN, DM, HFpEF (55%), who presented to the emergency department with c/o shortness of breath. She was found to have pulmonary vascular congestion, elevated BNP, D-Dimer elevation, and increased troponin/type II MI in the setting of demand ischemia. CT Angio negative for pulmonary embolism, CXR with bilateral pleural effusions suggestive of mild pulmonary edema. She was subsequently admitted for acute on chronic heart failure exacerbation and started on IV diuresis with good effect. Patient's hospital course complicated by anemia requiring 1 unit PRBC and iron sucrose - likely multi-factorial anemia in the setting of iron deficiency and chronic disease given elevated TIBC and ferritin levels with concomitant low iron levels. H/H now stable-  no melena, hematochezia or hematemesis observed while IP.  EGD/Colonoscopy yesterday with Dr. Graza showed gastritis, ulcer, and hemorrhoids, started on PPI. Initial plan was discharge to [facility] per PT recommendations, however, daughter is a HHA and wishes to take the patient home.

## 2020-12-17 NOTE — CHART NOTE - NSCHARTNOTEFT_GEN_A_CORE
EVENT: Pt is hypertensive.     HPI:  88 year old female with PMHx of dementia, HTN, DM, CHFpEF, presenting with shortness of breath. WIth findings of pulmonary congestion, BNP and trop elevation, D-dimer elevation, CT negative for PE.  Pt. admitted to medicine for acute on chronic HF, followed by card Dr. Montes, on Lasix diuresis with good effect.  Pt. evaluated by PT, recommended KURT however pt. and family decline KURT and wish to discharge to home.  Pt.'s daughter is HHA. Hospital course complicated with worsening anemia requiring blood transfusion, GI Dr. Garza consulted, will go for EGD/ Colonoscopy tomorrow.      SUBJECTIVE: Limited due to mental status    OBJECTIVE:  Vital Signs Last 24 Hrs  T(C): 36.9 (17 Dec 2020 00:17), Max: 37 (16 Dec 2020 14:40)  T(F): 98.5 (17 Dec 2020 00:17), Max: 98.6 (16 Dec 2020 14:40)  HR: 86 (17 Dec 2020 00:17) (74 - 87)  BP: 183/79 (17 Dec 2020 00:17) (159/76 - 183/79)  BP(mean): --  RR: 16 (17 Dec 2020 00:17) (16 - 18)  SpO2: 97% (17 Dec 2020 00:17) (97% - 100%)    FOCUSED PHYSICAL EXAM:  Neuro: Awake and alert, oriented to person, place, forgetful  Cardiovascular: + S1, S2, no murmurs, rubs, or bruits  Respiratory: clear to auscultation bilaterally with good air entry   GI: Abdomen soft, non-tender, bowel sounds present   : Non distended;   Skin: warm and dry; no rash      LABS:                        9.1    7.96  )-----------( 187      ( 16 Dec 2020 07:45 )             30.0     12-16    133<L>  |  101  |  18  ----------------------------<  149<H>  4.5   |  23  |  0.60    Ca    8.6      16 Dec 2020 07:45  Mg     1.7     12-16    TPro  6.0  /  Alb  1.8<L>  /  TBili  0.6  /  DBili  x   /  AST  32  /  ALT  23  /  AlkPhos  301<H>  12-16    EKG:   IMAGING:    ASSESSMENT: Hx of hypertension, now with hypertensive episode, pt is asymptomatic     PLAN:     -Metoprolol 5 mg IVP x 1 dose, ordered  -C/w Metoprolol and Cozaar with hold parameters  -Cardiology, Dr. Montes following     FOLLOW UP / RESULT:     -Reassess BP EVENT: Pt is hypertensive.     HPI:  88 year old female with PMHx of dementia, HTN, DM, CHFpEF, presenting with shortness of breath. WIth findings of pulmonary congestion, BNP and trop elevation, D-dimer elevation, CT negative for PE.  Pt. admitted to medicine for acute on chronic HF, followed by card Dr. Montes, on Lasix diuresis with good effect.  Pt. evaluated by PT, recommended KURT however pt. and family decline KURT and wish to discharge to home.  Pt.'s daughter is HHA. Hospital course complicated with worsening anemia requiring blood transfusion, GI Dr. Garza consulted, will go for EGD/ Colonoscopy tomorrow.      SUBJECTIVE: Limited due to mental status    OBJECTIVE:  Vital Signs Last 24 Hrs  T(C): 36.9 (17 Dec 2020 00:17), Max: 37 (16 Dec 2020 14:40)  T(F): 98.5 (17 Dec 2020 00:17), Max: 98.6 (16 Dec 2020 14:40)  HR: 86 (17 Dec 2020 00:17) (74 - 87)  BP: 183/79 (17 Dec 2020 00:17) (159/76 - 183/79)  BP(mean): --  RR: 16 (17 Dec 2020 00:17) (16 - 18)  SpO2: 97% (17 Dec 2020 00:17) (97% - 100%)    FOCUSED PHYSICAL EXAM:  Neuro: Awake and alert, oriented to person, place, forgetful  Cardiovascular: + S1, S2, no murmurs, rubs, or bruits  Respiratory: clear to auscultation bilaterally with good air entry   GI: Abdomen soft, non-tender, bowel sounds present   : Non distended;   Skin: warm and dry; no rash      LABS:                        9.1    7.96  )-----------( 187      ( 16 Dec 2020 07:45 )             30.0     12-16    133<L>  |  101  |  18  ----------------------------<  149<H>  4.5   |  23  |  0.60    Ca    8.6      16 Dec 2020 07:45  Mg     1.7     12-16    TPro  6.0  /  Alb  1.8<L>  /  TBili  0.6  /  DBili  x   /  AST  32  /  ALT  23  /  AlkPhos  301<H>  12-16    EKG:   IMAGING:    ASSESSMENT: Hx of hypertension, now with hypertensive episode-IVF(possible contributing factor?)  pt is asymptomatic     PLAN:     -Metoprolol 5 mg IVP x 1 dose, ordered  -C/w Metoprolol and Cozaar with hold parameters  -IVF reduced to 50 ml/hr, while NPO  -Cardiology, Dr. Montes following     FOLLOW UP / RESULT:     -Reassess BP EVENT: Pt is hypertensive.     HPI:  88 year old female with PMHx of dementia, HTN, DM, CHFpEF, presenting with shortness of breath. WIth findings of pulmonary congestion, BNP and trop elevation, D-dimer elevation, CT negative for PE.  Pt. admitted to medicine for acute on chronic HF, followed by card Dr. Montes, on Lasix diuresis with good effect.  Pt. evaluated by PT, recommended KURT however pt. and family decline KURT and wish to discharge to home.  Pt.'s daughter is HHA. Hospital course complicated with worsening anemia requiring blood transfusion, GI Dr. Garza consulted, will go for EGD/ Colonoscopy tomorrow.      SUBJECTIVE: Limited due to mental status    OBJECTIVE:  Vital Signs Last 24 Hrs  T(C): 36.9 (17 Dec 2020 00:17), Max: 37 (16 Dec 2020 14:40)  T(F): 98.5 (17 Dec 2020 00:17), Max: 98.6 (16 Dec 2020 14:40)  HR: 86 (17 Dec 2020 00:17) (74 - 87)  BP: 183/79 (17 Dec 2020 00:17) (159/76 - 183/79)  BP(mean): --  RR: 16 (17 Dec 2020 00:17) (16 - 18)  SpO2: 97% (17 Dec 2020 00:17) (97% - 100%)    FOCUSED PHYSICAL EXAM:  Neuro: Awake and alert, oriented to person, place, forgetful  Cardiovascular: + S1, S2, no murmurs, rubs, or bruits  Respiratory: clear to auscultation bilaterally with good air entry   GI: Abdomen soft, non-tender, bowel sounds present   : Non distended;   Skin: warm and dry; no rash      LABS:                        9.1    7.96  )-----------( 187      ( 16 Dec 2020 07:45 )             30.0     12-16    133<L>  |  101  |  18  ----------------------------<  149<H>  4.5   |  23  |  0.60    Ca    8.6      16 Dec 2020 07:45  Mg     1.7     12-16    TPro  6.0  /  Alb  1.8<L>  /  TBili  0.6  /  DBili  x   /  AST  32  /  ALT  23  /  AlkPhos  301<H>  12-16    EKG:   IMAGING:    ASSESSMENT: Hx of hypertension, now with hypertensive episode-IVF(possible contributing factor?)  pt is asymptomatic     PLAN:     -Metoprolol 5 mg IVP x 1 dose, ordered  -C/w Metoprolol and Cozaar with hold parameters  -IVF reduced to 50 ml/hr, while NPO  -Cardiology, Dr. Montes following     FOLLOW UP / RESULT:     -BP improved to 156/65  -Continue to monitor BP

## 2020-12-17 NOTE — PROGRESS NOTE ADULT - PROBLEM SELECTOR PLAN 1
-p/w worsening sob in setting of acute on chronic diastolic heart failure   - CXR with bilateral pleural effusions c/w pulmonary vascular congestion  - type II MI likely in the setting of demand ischemia, troponins downtrended 2.430 ---> 0.728  -Echo with mod to sev MR, GD2DDm and HFpEF of 55%  -cont Lasix, beta blockade, statin and ASA   - I's and O's  - daily weights   - Cardiology Dr. Montes -p/w worsening sob in setting of acute on chronic diastolic heart failure   - CXR with bilateral pleural effusions c/w pulmonary vascular congestion  - type II MI likely in the setting of demand ischemia, troponin downtrended 2.430 ---> 0.728  -Echo with mod to severe MR, grade II diastolic dysfunction and HFpEF of 55%  -cont Lasix, beta blockade, statin and ASA   - monitor electrolytes given diuresis, replete as necessary   - strict I's and O's  - daily weights   - Cardiology Dr. Montes

## 2020-12-17 NOTE — PROGRESS NOTE ADULT - PROBLEM SELECTOR PLAN 9
DNR/ DNI   MOLST in the chart pending EGd/ Colonoscopy tomorrow  if normal, pt will be d/c home tomorrow  daughter( Cordell)  wants to set up the transportation when pt is medically optimized for d/c

## 2020-12-17 NOTE — PROGRESS NOTE ADULT - SUBJECTIVE AND OBJECTIVE BOX
CHIEF COMPLAINT:Patient is a 88y old  Female who presents with a chief complaint of Shortness of Breath.Pt appears comfortable.    	  REVIEW OF SYSTEMS:  CONSTITUTIONAL: No fever, weight loss, or fatigue  EYES: No eye pain, visual disturbances, or discharge  ENT:  No difficulty hearing, tinnitus, vertigo; No sinus or throat pain  NECK: No pain or stiffness  RESPIRATORY: No cough, wheezing, chills or hemoptysis; No Shortness of Breath  CARDIOVASCULAR: No chest pain, palpitations, passing out, dizziness, or leg swelling  GASTROINTESTINAL: No abdominal or epigastric pain. No nausea, vomiting, or hematemesis; No diarrhea or constipation. No melena or hematochezia.  GENITOURINARY: No dysuria, frequency, hematuria, or incontinence  NEUROLOGICAL: No headaches, memory loss, loss of strength, numbness, or tremors  SKIN: No itching, burning, rashes, or lesions   LYMPH Nodes: No enlarged glands  ENDOCRINE: No heat or cold intolerance; No hair loss  MUSCULOSKELETAL: No joint pain or swelling; No muscle, back, or extremity pain  PSYCHIATRIC: No depression, anxiety, mood swings, or difficulty sleeping  HEME/LYMPH: No easy bruising, or bleeding gums  ALLERGY AND IMMUNOLOGIC: No hives or eczema	        PHYSICAL EXAM:  T(C): 36.9 (12-17-20 @ 13:09), Max: 37.1 (12-17-20 @ 05:41)  HR: 72 (12-17-20 @ 13:09) (72 - 87)  BP: 153/75 (12-17-20 @ 13:09) (153/75 - 183/79)  RR: 17 (12-17-20 @ 13:09) (16 - 18)  SpO2: 97% (12-17-20 @ 13:09) (96% - 100%)  Wt(kg): --  I&O's Summary    16 Dec 2020 07:01  -  17 Dec 2020 07:00  --------------------------------------------------------  IN: 240 mL / OUT: 0 mL / NET: 240 mL        Appearance: Normal	  HEENT:   Normal oral mucosa, PERRL, EOMI	  Lymphatic: No lymphadenopathy  Cardiovascular: Normal S1 S2, No JVD, No murmurs, No edema  Respiratory: Lungs clear to auscultation	  Psychiatry: A & O x 3, Mood & affect appropriate  Gastrointestinal:  Soft, Non-tender, + BS	  Skin: No rashes, No ecchymoses, No cyanosis	  Neurologic: Non-focal  Extremities: Normal range of motion, No clubbing, cyanosis or edema  Vascular: Peripheral pulses palpable 2+ bilaterally    MEDICATIONS  (STANDING):  aspirin  chewable 81 milliGRAM(s) Oral daily  atorvastatin 40 milliGRAM(s) Oral at bedtime  calcium carbonate 1250 mG  + Vitamin D (OsCal 500 + D) 1 Tablet(s) Oral daily  donepezil 5 milliGRAM(s) Oral at bedtime  furosemide    Tablet 20 milliGRAM(s) Oral daily  insulin lispro (ADMELOG) corrective regimen sliding scale   SubCutaneous three times a day before meals  losartan 25 milliGRAM(s) Oral two times a day  memantine 10 milliGRAM(s) Oral daily  metoprolol tartrate 50 milliGRAM(s) Oral two times a day  senna 2 Tablet(s) Oral at bedtime  sodium chloride 0.9%. 1000 milliLiter(s) (50 mL/Hr) IV Continuous <Continuous>    	  	  LABS:	 	                          8.8    8.05  )-----------( 232      ( 17 Dec 2020 07:01 )             28.4     12-17    131<L>  |  98  |  16  ----------------------------<  153<H>  3.5   |  22  |  0.51    Ca    8.2<L>      17 Dec 2020 07:01  Mg     1.7     12-16    TPro  6.0  /  Alb  1.8<L>  /  TBili  0.6  /  DBili  x   /  AST  32  /  ALT  23  /  AlkPhos  301<H>  12-16    proBNP: Serum Pro-Brain Natriuretic Peptide: 90120 pg/mL (12-11 @ 03:43)    TSH: Thyroid Stimulating Hormone, Serum: 3.71 uU/mL (12-12 @ 07:21)

## 2020-12-17 NOTE — DISCHARGE NOTE PROVIDER - DETAILS OF MALNUTRITION DIAGNOSIS/DIAGNOSES
This patient has been assessed with a concern for Malnutrition and was treated during this hospitalization for the following Nutrition diagnosis/diagnoses:     -  12/16/2020: Severe protein-calorie malnutrition   This patient has been assessed with a concern for Malnutrition and was treated during this hospitalization for the following Nutrition diagnosis/diagnoses:     -  12/16/2020: Severe protein-calorie malnutrition    This patient has been assessed with a concern for Malnutrition and was treated during this hospitalization for the following Nutrition diagnosis/diagnoses:     -  12/16/2020: Severe protein-calorie malnutrition   This patient has been assessed with a concern for Malnutrition and was treated during this hospitalization for the following Nutrition diagnosis/diagnoses:     -  12/16/2020: Severe protein-calorie malnutrition    This patient has been assessed with a concern for Malnutrition and was treated during this hospitalization for the following Nutrition diagnosis/diagnoses:     -  12/16/2020: Severe protein-calorie malnutrition    This patient has been assessed with a concern for Malnutrition and was treated during this hospitalization for the following Nutrition diagnosis/diagnoses:     -  12/16/2020: Severe protein-calorie malnutrition   This patient has been assessed with a concern for Malnutrition and was treated during this hospitalization for the following Nutrition diagnosis/diagnoses:     -  12/16/2020: Severe protein-calorie malnutrition    This patient has been assessed with a concern for Malnutrition and was treated during this hospitalization for the following Nutrition diagnosis/diagnoses:     -  12/16/2020: Severe protein-calorie malnutrition    This patient has been assessed with a concern for Malnutrition and was treated during this hospitalization for the following Nutrition diagnosis/diagnoses:     -  12/16/2020: Severe protein-calorie malnutrition    This patient has been assessed with a concern for Malnutrition and was treated during this hospitalization for the following Nutrition diagnosis/diagnoses:     -  12/16/2020: Severe protein-calorie malnutrition   This patient has been assessed with a concern for Malnutrition and was treated during this hospitalization for the following Nutrition diagnosis/diagnoses:     -  12/16/2020: Severe protein-calorie malnutrition    This patient has been assessed with a concern for Malnutrition and was treated during this hospitalization for the following Nutrition diagnosis/diagnoses:     -  12/16/2020: Severe protein-calorie malnutrition    This patient has been assessed with a concern for Malnutrition and was treated during this hospitalization for the following Nutrition diagnosis/diagnoses:     -  12/16/2020: Severe protein-calorie malnutrition    This patient has been assessed with a concern for Malnutrition and was treated during this hospitalization for the following Nutrition diagnosis/diagnoses:     -  12/16/2020: Severe protein-calorie malnutrition    This patient has been assessed with a concern for Malnutrition and was treated during this hospitalization for the following Nutrition diagnosis/diagnoses:     -  12/16/2020: Severe protein-calorie malnutrition

## 2020-12-17 NOTE — DISCHARGE NOTE PROVIDER - CARE PROVIDER_API CALL
[facility]  INTERNAL MEDICINE  [address] [place] [address]  Phone: [phone]  Fax: [phone]  Follow Up Time: 2 weeks   Amada Montesh  INTERNAL MEDICINE  [address] [place] [address]  Phone: [phone]  Fax: [phone]  Follow Up Time: 2 weeks    Deondre Garza)  Medicine  [address] [place] [address], [address] [place] [address]  Phone: [phone]  Fax: [phone]  Follow Up Time: 2 weeks

## 2020-12-17 NOTE — PROGRESS NOTE ADULT - PROBLEM SELECTOR PLAN 10
pending EGd/ Colonoscopy tomorrow  if normal, pt will be d/c home tomorrow  daughter( Cordell)  wants to set up the transportation when pt is medically optimized for d/c
pending EGd/ Colonoscopy tomorrow  if normal, pt will be d/c home tomorrow  daughter( Cordell)  wants to set up the transportation when pt is medically optimized for d/c

## 2020-12-17 NOTE — PROGRESS NOTE ADULT - ASSESSMENT
Patient is an 88 year old female with a past medical history of HTN, DM, HFpEF (55%), who presented to the emergency department with c/o shortness of breath. She was found to have pulmonary vascular congestion, elevated BNP, D-Dimer elevation, and increased troponin/type II MI in the setting of demand ischemia. CT Angio negative for pulmonary embolism, CXR with bilateral pleural effusions suggestive of mild pulmonary edema. She was subsequently admitted for acute on chronic heart failure exacerbation and started in IV diuresis with good effect. Patient's hospital course complicated by anemia requiring 1 unit PRBC and iron sucrose - likely multi-factorial anemia in the setting of iron deficiency and chronic disease given elevated TIBC and ferritin levels with concomitant low iron levels. H/H stable.  Pt is pending EGD/Colonoscopy today with Dr. Harrington for evaluation of this anemia. Initial plan was discharge to [facility] per PT recommendations, however, daughter is a HHA and wishes to take the patient home.  Patient is an 88 year old female with a past medical history of HTN, DM, HFpEF (55%), who presented to the emergency department with c/o shortness of breath. She was found to have pulmonary vascular congestion, elevated BNP, D-Dimer elevation, and increased troponin/type II MI in the setting of demand ischemia. CT Angio negative for pulmonary embolism, CXR with bilateral pleural effusions suggestive of mild pulmonary edema. She was subsequently admitted for acute on chronic heart failure exacerbation and started on IV diuresis with good effect. Patient's hospital course complicated by anemia requiring 1 unit PRBC and iron sucrose - likely multi-factorial anemia in the setting of iron deficiency and chronic disease given elevated TIBC and ferritin levels with concomitant low iron levels. H/H now stable-  no melena, hematochezia or hematemesis observed while IP.  Pt is pending EGD/Colonoscopy today with Dr. Harrington for evaluation of this anemia. Initial plan was discharge to [facility] per PT recommendations, however, daughter is a HHA and wishes to take the patient home.

## 2020-12-17 NOTE — PROGRESS NOTE ADULT - PROBLEM SELECTOR PLAN 7
-mag 1.4, supplemented   -repeat level in AM -IMPROVE VTE score 4  -dvt ppx- hold chemo ppx given anemia, cont SCDs -IMPROVE VTE score 4  -dvt ppx- hold chemo ppx given anemia, cont SCDs  GI: Start Protonix 40 mg PO after colonoscopy/EGD

## 2020-12-17 NOTE — PROGRESS NOTE ADULT - SUBJECTIVE AND OBJECTIVE BOX
NP Note discussed with  Primary Attending    Patient is a 88y old  Female who presents with a chief complaint of Shortness of Breath (16 Dec 2020 11:50)      INTERVAL HPI/OVERNIGHT EVENTS: no new complaints    MEDICATIONS  (STANDING):  aspirin  chewable 81 milliGRAM(s) Oral daily  atorvastatin 40 milliGRAM(s) Oral at bedtime  calcium carbonate 1250 mG  + Vitamin D (OsCal 500 + D) 1 Tablet(s) Oral daily  donepezil 5 milliGRAM(s) Oral at bedtime  furosemide    Tablet 20 milliGRAM(s) Oral daily  insulin lispro (ADMELOG) corrective regimen sliding scale   SubCutaneous three times a day before meals  losartan 25 milliGRAM(s) Oral two times a day  memantine 10 milliGRAM(s) Oral daily  metoprolol tartrate 50 milliGRAM(s) Oral two times a day  senna 2 Tablet(s) Oral at bedtime  sodium chloride 0.9%. 1000 milliLiter(s) (50 mL/Hr) IV Continuous <Continuous>    MEDICATIONS  (PRN):      __________________________________________________  REVIEW OF SYSTEMS:    CONSTITUTIONAL: No fever,   EYES: no acute visual disturbances  NECK: No pain or stiffness  RESPIRATORY: No cough; No shortness of breath  CARDIOVASCULAR: No chest pain, no palpitations  GASTROINTESTINAL: No pain. No nausea or vomiting; No diarrhea   NEUROLOGICAL: No headache or numbness, no tremors  MUSCULOSKELETAL: No joint pain, no muscle pain  GENITOURINARY: no dysuria, no frequency, no hesitancy  PSYCHIATRY: no depression , no anxiety  ALL OTHER  ROS negative        Vital Signs Last 24 Hrs  T(C): 37.1 (17 Dec 2020 05:41), Max: 37.1 (17 Dec 2020 05:41)  T(F): 98.8 (17 Dec 2020 05:41), Max: 98.8 (17 Dec 2020 05:41)  HR: 83 (17 Dec 2020 05:41) (74 - 87)  BP: 164/69 (17 Dec 2020 05:41) (156/65 - 183/79)  BP(mean): --  RR: 18 (17 Dec 2020 05:41) (16 - 18)  SpO2: 96% (17 Dec 2020 05:41) (96% - 100%)    ________________________________________________  PHYSICAL EXAM:  GENERAL: elderly, cachectic female lying comfortably in hosputal bed   HEENT: Normocephalic;  conjunctivae and sclerae clear; moist mucous membranes;   NECK : supple  CHEST/LUNG: diminished in BLL, otherwise clear throughout   HEART: S1 S2  regular; no murmurs, gallops or rubs  ABDOMEN: Soft, Nontender, Nondistended; hyperactive bowel sounds  EXTREMITIES: no cyanosis; no edema; no calf tenderness  SKIN: warm and dry; no rash  NERVOUS SYSTEM:  Awake and alert; Oriented  x 2, patient's baseline ; no new deficits    _________________________________________________  LABS:                        8.8    8.05  )-----------( 232      ( 17 Dec 2020 07:01 )             28.4     12-17    131<L>  |  98  |  16  ----------------------------<  153<H>  3.5   |  22  |  0.51    Ca    8.2<L>      17 Dec 2020 07:01  Mg     1.7     12-16    TPro  6.0  /  Alb  1.8<L>  /  TBili  0.6  /  DBili  x   /  AST  32  /  ALT  23  /  AlkPhos  301<H>  12-16        CAPILLARY BLOOD GLUCOSE      POCT Blood Glucose.: 170 mg/dL (17 Dec 2020 07:46)  POCT Blood Glucose.: 286 mg/dL (16 Dec 2020 21:22)  POCT Blood Glucose.: 212 mg/dL (16 Dec 2020 18:03)  POCT Blood Glucose.: 167 mg/dL (16 Dec 2020 16:42)  POCT Blood Glucose.: 200 mg/dL (16 Dec 2020 12:55)        RADIOLOGY & ADDITIONAL TESTS:    Imaging  Reviewed:  YES  < from: Xray Chest 1 View- PORTABLE-Urgent (12.11.20 @ 03:20) >    IMPRESSION:    Pulmonary vascular congestion and bilateral pleural effusions suggestive of pulmonary edema. Patchy airspace infiltrates may be on the basis of pulmonary edema or Covid pneumonia.    < from: CT Angio Chest w/ IV Cont (12.11.20 @ 09:58) >  IMPRESSION:    Absence of enteric contrast limits evaluation of the GI tract.    No evidence of pulmonary embolism.    Moderate bilateral pleural effusions with underlying compressive atelectasis lower lobes. Mild pulmonary edema. Right upper lobe groundglass opacity may be on the basis of pulmonary edema or pneumonia.    Soft tissue extending along the right lower lobe pulmonary veins may represent infiltrate or tumor.    Mediastinal and hilar adenopathy secondary to neoplasm or infection.    Cardiomegaly. Ectatic ascending aorta at 3.2 cm.    Trace pericholecystic fluid. Recommend abdominal ultrasound to evaluate for acute cholecystitis.    Nonspecific prominent perigastric lymph nodes.      < from: US Duplex Venous Lower Ext Complete, Bilateral (12.11.20 @ 14:37) >    IMPRESSION:  No evidence of deep venous thrombosis in either lower extremity.      < from: US Hepatic & Pancreatic (12.11.20 @ 15:16) >    IMPRESSION:  Nonspecific trace pericholecystic fluid.    Enlarged liver          Consultant(s) Notes Reviewed:   YES      Plan of care was discussed with patient and /or primary care giver; all questions and concerns were addressed  NP Note discussed with  Primary Attending    Patient is a 88y old  Female who presents with a chief complaint of Shortness of Breath (16 Dec 2020 11:50)      INTERVAL HPI/OVERNIGHT EVENTS: no new complaints    MEDICATIONS  (STANDING):  aspirin  chewable 81 milliGRAM(s) Oral daily  atorvastatin 40 milliGRAM(s) Oral at bedtime  calcium carbonate 1250 mG  + Vitamin D (OsCal 500 + D) 1 Tablet(s) Oral daily  donepezil 5 milliGRAM(s) Oral at bedtime  furosemide    Tablet 20 milliGRAM(s) Oral daily  insulin lispro (ADMELOG) corrective regimen sliding scale   SubCutaneous three times a day before meals  losartan 25 milliGRAM(s) Oral two times a day  memantine 10 milliGRAM(s) Oral daily  metoprolol tartrate 50 milliGRAM(s) Oral two times a day  senna 2 Tablet(s) Oral at bedtime  sodium chloride 0.9%. 1000 milliLiter(s) (50 mL/Hr) IV Continuous <Continuous>    MEDICATIONS  (PRN):      __________________________________________________  REVIEW OF SYSTEMS:    CONSTITUTIONAL: No fever,   EYES: no acute visual disturbances  NECK: No pain or stiffness  RESPIRATORY: No cough; No shortness of breath  CARDIOVASCULAR: No chest pain, no palpitations  GASTROINTESTINAL: No pain. No nausea or vomiting; No diarrhea   NEUROLOGICAL: No headache or numbness, no tremors  MUSCULOSKELETAL: No joint pain, no muscle pain  GENITOURINARY: no dysuria, no frequency, no hesitancy  PSYCHIATRY: no depression , no anxiety  ALL OTHER  ROS negative        Vital Signs Last 24 Hrs  T(C): 37.1 (17 Dec 2020 05:41), Max: 37.1 (17 Dec 2020 05:41)  T(F): 98.8 (17 Dec 2020 05:41), Max: 98.8 (17 Dec 2020 05:41)  HR: 83 (17 Dec 2020 05:41) (74 - 87)  BP: 164/69 (17 Dec 2020 05:41) (156/65 - 183/79)  BP(mean): --  RR: 18 (17 Dec 2020 05:41) (16 - 18)  SpO2: 96% (17 Dec 2020 05:41) (96% - 100%)    ________________________________________________  PHYSICAL EXAM:  GENERAL: elderly, cachectic female lying comfortably in hosputal bed   HEENT: Normocephalic;  conjunctivae and sclerae clear; moist mucous membranes;   NECK : supple  CHEST/LUNG: diminished in BLL, otherwise clear throughout   HEART: S1 S2  regular; +systolic murmur, no gallops or rubs  ABDOMEN: Soft, Nontender, Nondistended; hyperactive bowel sounds  EXTREMITIES: no cyanosis; no edema; no calf tenderness  SKIN: warm and dry; no rash  NERVOUS SYSTEM:  Awake and alert; Oriented  x 2, patient's baseline ; no new deficits    _________________________________________________  LABS:                        8.8    8.05  )-----------( 232      ( 17 Dec 2020 07:01 )             28.4     12-17    131<L>  |  98  |  16  ----------------------------<  153<H>  3.5   |  22  |  0.51    Ca    8.2<L>      17 Dec 2020 07:01  Mg     1.7     12-16    TPro  6.0  /  Alb  1.8<L>  /  TBili  0.6  /  DBili  x   /  AST  32  /  ALT  23  /  AlkPhos  301<H>  12-16        CAPILLARY BLOOD GLUCOSE      POCT Blood Glucose.: 170 mg/dL (17 Dec 2020 07:46)  POCT Blood Glucose.: 286 mg/dL (16 Dec 2020 21:22)  POCT Blood Glucose.: 212 mg/dL (16 Dec 2020 18:03)  POCT Blood Glucose.: 167 mg/dL (16 Dec 2020 16:42)  POCT Blood Glucose.: 200 mg/dL (16 Dec 2020 12:55)        RADIOLOGY & ADDITIONAL TESTS:    Imaging  Reviewed:  YES  < from: Xray Chest 1 View- PORTABLE-Urgent (12.11.20 @ 03:20) >    IMPRESSION:    Pulmonary vascular congestion and bilateral pleural effusions suggestive of pulmonary edema. Patchy airspace infiltrates may be on the basis of pulmonary edema or Covid pneumonia.    < from: CT Angio Chest w/ IV Cont (12.11.20 @ 09:58) >  IMPRESSION:    Absence of enteric contrast limits evaluation of the GI tract.    No evidence of pulmonary embolism.    Moderate bilateral pleural effusions with underlying compressive atelectasis lower lobes. Mild pulmonary edema. Right upper lobe groundglass opacity may be on the basis of pulmonary edema or pneumonia.    Soft tissue extending along the right lower lobe pulmonary veins may represent infiltrate or tumor.    Mediastinal and hilar adenopathy secondary to neoplasm or infection.    Cardiomegaly. Ectatic ascending aorta at 3.2 cm.    Trace pericholecystic fluid. Recommend abdominal ultrasound to evaluate for acute cholecystitis.    Nonspecific prominent perigastric lymph nodes.      < from: US Duplex Venous Lower Ext Complete, Bilateral (12.11.20 @ 14:37) >    IMPRESSION:  No evidence of deep venous thrombosis in either lower extremity.      < from: US Hepatic & Pancreatic (12.11.20 @ 15:16) >    IMPRESSION:  Nonspecific trace pericholecystic fluid.    Enlarged liver          Consultant(s) Notes Reviewed:   YES      Plan of care was discussed with patient and /or primary care giver; all questions and concerns were addressed  NP Note discussed with  Primary Attending    Patient is a 88y old  Female who presents with a chief complaint of Shortness of Breath (16 Dec 2020 11:50)      INTERVAL HPI/OVERNIGHT EVENTS: no new complaints    MEDICATIONS  (STANDING):  aspirin  chewable 81 milliGRAM(s) Oral daily  atorvastatin 40 milliGRAM(s) Oral at bedtime  calcium carbonate 1250 mG  + Vitamin D (OsCal 500 + D) 1 Tablet(s) Oral daily  donepezil 5 milliGRAM(s) Oral at bedtime  furosemide    Tablet 20 milliGRAM(s) Oral daily  insulin lispro (ADMELOG) corrective regimen sliding scale   SubCutaneous three times a day before meals  losartan 25 milliGRAM(s) Oral two times a day  memantine 10 milliGRAM(s) Oral daily  metoprolol tartrate 50 milliGRAM(s) Oral two times a day  senna 2 Tablet(s) Oral at bedtime  sodium chloride 0.9%. 1000 milliLiter(s) (50 mL/Hr) IV Continuous <Continuous>    MEDICATIONS  (PRN):      __________________________________________________  REVIEW OF SYSTEMS:    CONSTITUTIONAL: No fever,   EYES: no acute visual disturbances  NECK: No pain or stiffness  RESPIRATORY: No cough; No shortness of breath  CARDIOVASCULAR: No chest pain, no palpitations  GASTROINTESTINAL: No pain. No nausea or vomiting; No diarrhea   NEUROLOGICAL: No headache or numbness, no tremors  MUSCULOSKELETAL: No joint pain, no muscle pain  GENITOURINARY: no dysuria, no frequency, no hesitancy  PSYCHIATRY: no depression , no anxiety  ALL OTHER  ROS negative        Vital Signs Last 24 Hrs  T(C): 37.1 (17 Dec 2020 05:41), Max: 37.1 (17 Dec 2020 05:41)  T(F): 98.8 (17 Dec 2020 05:41), Max: 98.8 (17 Dec 2020 05:41)  HR: 83 (17 Dec 2020 05:41) (74 - 87)  BP: 164/69 (17 Dec 2020 05:41) (156/65 - 183/79)  BP(mean): --  RR: 18 (17 Dec 2020 05:41) (16 - 18)  SpO2: 96% (17 Dec 2020 05:41) (96% - 100%)    ________________________________________________  PHYSICAL EXAM:  GENERAL: elderly, cachectic female lying comfortably in hospital bed   HEENT: Normocephalic;  conjunctivae and sclerae clear; moist mucous membranes;   NECK : supple  CHEST/LUNG: diminished in BLL, otherwise clear throughout   HEART: S1 S2  regular; +systolic murmur, no gallops or rubs  ABDOMEN: Soft, Nontender, Nondistended; hyperactive bowel sounds  EXTREMITIES: no cyanosis; no edema; no calf tenderness  SKIN: warm and dry; no rash  NERVOUS SYSTEM:  Awake and alert; Oriented  x 2, patient's baseline ; no new deficits    _________________________________________________  LABS:                        8.8    8.05  )-----------( 232      ( 17 Dec 2020 07:01 )             28.4     12-17    131<L>  |  98  |  16  ----------------------------<  153<H>  3.5   |  22  |  0.51    Ca    8.2<L>      17 Dec 2020 07:01  Mg     1.7     12-16    TPro  6.0  /  Alb  1.8<L>  /  TBili  0.6  /  DBili  x   /  AST  32  /  ALT  23  /  AlkPhos  301<H>  12-16        CAPILLARY BLOOD GLUCOSE      POCT Blood Glucose.: 170 mg/dL (17 Dec 2020 07:46)  POCT Blood Glucose.: 286 mg/dL (16 Dec 2020 21:22)  POCT Blood Glucose.: 212 mg/dL (16 Dec 2020 18:03)  POCT Blood Glucose.: 167 mg/dL (16 Dec 2020 16:42)  POCT Blood Glucose.: 200 mg/dL (16 Dec 2020 12:55)        RADIOLOGY & ADDITIONAL TESTS:    Imaging  Reviewed:  YES  < from: Xray Chest 1 View- PORTABLE-Urgent (12.11.20 @ 03:20) >    IMPRESSION:    Pulmonary vascular congestion and bilateral pleural effusions suggestive of pulmonary edema. Patchy airspace infiltrates may be on the basis of pulmonary edema or Covid pneumonia.    < from: CT Angio Chest w/ IV Cont (12.11.20 @ 09:58) >  IMPRESSION:    Absence of enteric contrast limits evaluation of the GI tract.    No evidence of pulmonary embolism.    Moderate bilateral pleural effusions with underlying compressive atelectasis lower lobes. Mild pulmonary edema. Right upper lobe groundglass opacity may be on the basis of pulmonary edema or pneumonia.    Soft tissue extending along the right lower lobe pulmonary veins may represent infiltrate or tumor.    Mediastinal and hilar adenopathy secondary to neoplasm or infection.    Cardiomegaly. Ectatic ascending aorta at 3.2 cm.    Trace pericholecystic fluid. Recommend abdominal ultrasound to evaluate for acute cholecystitis.    Nonspecific prominent perigastric lymph nodes.      < from: US Duplex Venous Lower Ext Complete, Bilateral (12.11.20 @ 14:37) >    IMPRESSION:  No evidence of deep venous thrombosis in either lower extremity.      < from: US Hepatic & Pancreatic (12.11.20 @ 15:16) >    IMPRESSION:  Nonspecific trace pericholecystic fluid.    Enlarged liver          Consultant(s) Notes Reviewed:   YES      Plan of care was discussed with patient and /or primary care giver; all questions and concerns were addressed

## 2020-12-17 NOTE — PROGRESS NOTE ADULT - PROBLEM SELECTOR PLAN 5
-cont home dose of donepezil and memantine  -supportive care -cont home dose of donepezil and memantine  - frequent reorientation   -supportive care

## 2020-12-18 NOTE — PROGRESS NOTE ADULT - RS GEN PE MLT RESP DETAILS PC
airway patent/breath sounds equal/good air movement/no rales/no rhonchi/no wheezes
airway patent/breath sounds equal/good air movement/respirations non-labored/no rales/no rhonchi/no wheezes

## 2020-12-18 NOTE — PROGRESS NOTE ADULT - NEGATIVE NEUROLOGICAL SYMPTOMS
no syncope/no tremors/no vertigo/no loss of sensation/no headache/no loss of consciousness
no syncope/no tremors/no vertigo/no loss of sensation/no headache/no loss of consciousness

## 2020-12-18 NOTE — PROGRESS NOTE ADULT - SUBJECTIVE AND OBJECTIVE BOX
[   ] ICU           [   ] CCU          [ X  ] Medical Floor   ( Patient seen and examined earlier in the morning)        Patient is an 88 year old female from home, with daughter with past medical history significant for HTN, DM, CHF, dementia, admitted with shortness of breath found to have iron deficiency anemia. Patient is poor historian due to dementia. Patient c/o intermitted, crampy abdominal pain associated with constipation.    Today patient appears comfortable with no new complaint. No abdominal pain, nausea, vomiting, hematemesis, hematochezia, melena, fever, chills, chest pain, SOB, cough, hematuria, dysuria or diarrhea reported.      PAIN MANAGEMENT:  Pain Scale:                0 /10  Pain Location:       Prior Colonoscopy:  No prior colonoscopy     PAST MEDICAL HISTORY  CHF  CAD  TIA  Myositis  Osteoarthritis  HTN    DM       PAST SURGICAL HISTORY  No significant past surgical history      Allergies    No Known Allergies    Intolerances  None    SOCIAL HISTORY  Advanced Directives:       [  ] Full Code       [ X ] DNR  Marital Status:         [  ] M      [ X ] S      [  ] D       [  ] W  Children:       [ X ] Yes      [  ] No  Occupation:        [  ] Employed       [ X ] Unemployed       [  ] Retired  Diet:       [ X ] Regular       [  ] PEG feeding          [  ] NG tube feeding  Drug Use:           [ X ] Patient denied          [  ] Yes  Alcohol:           [ X ] No             [  ] Yes (socially)         [  ] Yes (chronic)  Tobacco:           [  ] Yes           [ X ] No      FAMILY HISTORY  [ X ] Heart Disease            [ X ] Diabetes             [ X ] HTN             [  ] Colon Cancer             [  ] Stomach Cancer              [  ] Pancreatic Cancer        VITALS  Vital Signs Last 24 Hrs  T(C): 36.7 (18 Dec 2020 15:11), Max: 36.8 (18 Dec 2020 05:50)  T(F): 98 (18 Dec 2020 15:11), Max: 98.3 (18 Dec 2020 05:50)  HR: 72 (18 Dec 2020 15:11) (72 - 92)  BP: 152/65 (18 Dec 2020 15:11) (148/54 - 175/47)     RR: 18 (18 Dec 2020 15:11) (18 - 18)  SpO2: 97% (18 Dec 2020 15:11) (97% - 98%)       MEDICATIONS  (STANDING):  aspirin  chewable 81 milliGRAM(s) Oral daily  atorvastatin 40 milliGRAM(s) Oral at bedtime  calcium carbonate 1250 mG  + Vitamin D (OsCal 500 + D) 1 Tablet(s) Oral daily  donepezil 5 milliGRAM(s) Oral at bedtime  furosemide    Tablet 20 milliGRAM(s) Oral daily  insulin lispro (ADMELOG) corrective regimen sliding scale   SubCutaneous three times a day before meals  losartan 25 milliGRAM(s) Oral two times a day  memantine 10 milliGRAM(s) Oral daily  metoprolol tartrate 50 milliGRAM(s) Oral two times a day  pantoprazole    Tablet 40 milliGRAM(s) Oral before breakfast  senna 2 Tablet(s) Oral at bedtime  sodium chloride 0.9%. 1000 milliLiter(s) (50 mL/Hr) IV Continuous <Continuous>                                 8.8    8.58  )-----------( 253      ( 18 Dec 2020 06:53 )             28.8       12-18    133<L>  |  101  |  18  ----------------------------<  164<H>  4.0   |  22  |  0.62    Ca    8.2<L>      18 Dec 2020 06:53  Mg     1.4     12-18

## 2020-12-18 NOTE — PROGRESS NOTE ADULT - ASSESSMENT
1. Iron deficiency anemia  2. S/p EGD and colonosvcopy  3. Diverticulosis  4. Colitis with mucosal ulceration  5. gastric ulcer  6. Esophagitis  7. No evidence of acute GI bleeding    Suggestions:    1. Monitor H/H  2. Transfuse PRBC as needed  3. Protonix 40mg daily  4. Avoid NSAID  5. Follow up path  6. Follow up stool study  7. DVT prophylaxis

## 2020-12-18 NOTE — PROGRESS NOTE ADULT - NEGATIVE OPHTHALMOLOGIC SYMPTOMS
no diplopia/no photophobia/no lacrimation L/no lacrimation R/no blurred vision L/no blurred vision R/no discharge L/no discharge R/no pain L/no pain R/no irritation L/no irritation R/no scleral injection L
no diplopia/no photophobia/no lacrimation L/no lacrimation R/no blurred vision L/no blurred vision R/no discharge L/no discharge R/no pain L/no pain R/no irritation L/no irritation R/no scleral injection L

## 2020-12-18 NOTE — PROGRESS NOTE ADULT - PROBLEM SELECTOR PLAN 2
-BP trending up, asymptomatic  -cont Lasix, Metoprolol, Losartan added today   -Hold for BP < 110, HR < 60  -rest plan as above
-BP trending up   -cont Lasix, Metoprolol, Losartan added today   -mon BP  -rest plan as above
-BP trending up   -cont Lasix, Metoprolol, Losartan added today   -mon BP  -rest plan as above
b/p stable on current regimen  -Cont metoprolol  -Hydralazine and amlodipine held for now. Continue monitoring BP
-BP trending up, asymptomatic  -cont Lasix, Metoprolol, Losartan added today   -Hold for BP < 110, HR < 60  -rest plan as above

## 2020-12-18 NOTE — PROGRESS NOTE ADULT - PROBLEM SELECTOR PROBLEM 2
Hypertensive heart disease with acute diastolic congestive heart failure
HTN (hypertension)
Hypertensive heart disease with acute diastolic congestive heart failure

## 2020-12-18 NOTE — PROGRESS NOTE ADULT - NEGATIVE GENERAL SYMPTOMS
no fever/no chills/no sweating/no polyphagia/no polyuria/no polydipsia
no fever/no chills/no sweating/no polyphagia/no polyuria/no polydipsia

## 2020-12-18 NOTE — PROGRESS NOTE ADULT - PROBLEM SELECTOR PROBLEM 6
Severe protein-calorie malnutrition
Iron deficiency anemia
Severe protein-calorie malnutrition

## 2020-12-18 NOTE — DISCHARGE NOTE NURSING/CASE MANAGEMENT/SOCIAL WORK - PATIENT PORTAL LINK FT
You can access the FollowMyHealth Patient Portal offered by [facility] by registering at the following website: [email or web address]. By joining [facility]’s FollowMyHealth portal, you will also be able to view your health information using other applications (apps) compatible with our system.

## 2020-12-18 NOTE — PROGRESS NOTE ADULT - ASSESSMENT
Patient is an 88 year old female with a past medical history of HTN, DM, HFpEF (55%), who presented to the emergency department with c/o shortness of breath. She was found to have pulmonary vascular congestion, elevated BNP, D-Dimer elevation, and increased troponin/type II MI in the setting of demand ischemia. CT Angio negative for pulmonary embolism, CXR with bilateral pleural effusions suggestive of mild pulmonary edema. She was subsequently admitted for acute on chronic heart failure exacerbation and started on IV diuresis with good effect. Patient's hospital course complicated by anemia requiring 1 unit PRBC and iron sucrose - likely multi-factorial anemia in the setting of iron deficiency and chronic disease given elevated TIBC and ferritin levels with concomitant low iron levels. H/H now stable-  no melena, hematochezia or hematemesis observed while IP.  Pt is pending EGD/Colonoscopy today with Dr. Harrington for evaluation of this anemia. Initial plan was discharge to [facility] per PT recommendations, however, daughter is a HHA and wishes to take the patient home.

## 2020-12-18 NOTE — PROGRESS NOTE ADULT - SUBJECTIVE AND OBJECTIVE BOX
CHIEF COMPLAINT:Patient is a 88y old  Female who presents with a chief complaint of Shortness of Breath.Pt appears comfortable.    	  REVIEW OF SYSTEMS:  [x ] Unable to obtain    PHYSICAL EXAM:  T(C): 36.8 (12-18-20 @ 05:50), Max: 36.9 (12-17-20 @ 13:09)  HR: 80 (12-18-20 @ 08:04) (72 - 92)  BP: 148/54 (12-18-20 @ 08:04) (148/54 - 175/47)  RR: 18 (12-18-20 @ 05:50) (17 - 18)  SpO2: 97% (12-18-20 @ 05:50) (97% - 98%)  Wt(kg): --  I&O's Summary    17 Dec 2020 07:01  -  18 Dec 2020 07:00  --------------------------------------------------------  IN: 240 mL / OUT: 300 mL / NET: -60 mL        Appearance: Normal	  HEENT:   Normal oral mucosa, PERRL, EOMI	  Lymphatic: No lymphadenopathy  Cardiovascular: Normal S1 S2, No JVD, No murmurs, No edema  Respiratory: Lungs clear to auscultation	  Gastrointestinal:  Soft, Non-tender, + BS	  Skin: No rashes, No ecchymoses, No cyanosis	  Extremities: Normal range of motion, No clubbing, cyanosis or edema  Vascular: Peripheral pulses palpable 2+ bilaterally    MEDICATIONS  (STANDING):  aspirin  chewable 81 milliGRAM(s) Oral daily  atorvastatin 40 milliGRAM(s) Oral at bedtime  calcium carbonate 1250 mG  + Vitamin D (OsCal 500 + D) 1 Tablet(s) Oral daily  donepezil 5 milliGRAM(s) Oral at bedtime  furosemide    Tablet 20 milliGRAM(s) Oral daily  insulin lispro (ADMELOG) corrective regimen sliding scale   SubCutaneous three times a day before meals  losartan 25 milliGRAM(s) Oral two times a day  magnesium sulfate  IVPB 2 Gram(s) IV Intermittent once  memantine 10 milliGRAM(s) Oral daily  metoprolol tartrate 50 milliGRAM(s) Oral two times a day  pantoprazole    Tablet 40 milliGRAM(s) Oral before breakfast  senna 2 Tablet(s) Oral at bedtime  sodium chloride 0.9%. 1000 milliLiter(s) (50 mL/Hr) IV Continuous <Continuous>      LABS:	 	                       8.8    8.58  )-----------( 253      ( 18 Dec 2020 06:53 )             28.8     12-18    133<L>  |  101  |  18  ----------------------------<  164<H>  4.0   |  22  |  0.62    Ca    8.2<L>      18 Dec 2020 06:53  Mg     1.4     12-18      proBNP: Serum Pro-Brain Natriuretic Peptide: 37131 pg/mL (12-11 @ 03:43)    TSH: Thyroid Stimulating Hormone, Serum: 3.71 uU/mL (12-12 @ 07:21)

## 2020-12-18 NOTE — PROGRESS NOTE ADULT - SUBJECTIVE AND OBJECTIVE BOX
NP Note discussed with  Primary Attending    Patient is a 88y old  Female who presents with a chief complaint of Shortness of Breath (16 Dec 2020 11:50)      INTERVAL HPI/OVERNIGHT EVENTS: no new complaints    MEDICATIONS  (STANDING):  aspirin  chewable 81 milliGRAM(s) Oral daily  atorvastatin 40 milliGRAM(s) Oral at bedtime  calcium carbonate 1250 mG  + Vitamin D (OsCal 500 + D) 1 Tablet(s) Oral daily  donepezil 5 milliGRAM(s) Oral at bedtime  furosemide    Tablet 20 milliGRAM(s) Oral daily  insulin lispro (ADMELOG) corrective regimen sliding scale   SubCutaneous three times a day before meals  losartan 25 milliGRAM(s) Oral two times a day  memantine 10 milliGRAM(s) Oral daily  metoprolol tartrate 50 milliGRAM(s) Oral two times a day  senna 2 Tablet(s) Oral at bedtime  sodium chloride 0.9%. 1000 milliLiter(s) (50 mL/Hr) IV Continuous <Continuous>    MEDICATIONS  (PRN):      __________________________________________________  REVIEW OF SYSTEMS:    CONSTITUTIONAL: No fever,   EYES: no acute visual disturbances  NECK: No pain or stiffness  RESPIRATORY: No cough; No shortness of breath  CARDIOVASCULAR: No chest pain, no palpitations  GASTROINTESTINAL: No pain. No nausea or vomiting; No diarrhea   NEUROLOGICAL: No headache or numbness, no tremors  MUSCULOSKELETAL: No joint pain, no muscle pain  GENITOURINARY: no dysuria, no frequency, no hesitancy  PSYCHIATRY: no depression , no anxiety  ALL OTHER  ROS negative        Vital Signs Last 24 Hrs  T(C): 37.1 (17 Dec 2020 05:41), Max: 37.1 (17 Dec 2020 05:41)  T(F): 98.8 (17 Dec 2020 05:41), Max: 98.8 (17 Dec 2020 05:41)  HR: 83 (17 Dec 2020 05:41) (74 - 87)  BP: 164/69 (17 Dec 2020 05:41) (156/65 - 183/79)  BP(mean): --  RR: 18 (17 Dec 2020 05:41) (16 - 18)  SpO2: 96% (17 Dec 2020 05:41) (96% - 100%)    ________________________________________________  PHYSICAL EXAM:  GENERAL: elderly, cachectic female lying comfortably in hospital bed   HEENT: Normocephalic;  conjunctivae and sclerae clear; moist mucous membranes;   NECK : supple  CHEST/LUNG: diminished in BLL, otherwise clear throughout   HEART: S1 S2  regular; +systolic murmur, no gallops or rubs  ABDOMEN: Soft, Nontender, Nondistended; hyperactive bowel sounds  EXTREMITIES: no cyanosis; no edema; no calf tenderness  SKIN: warm and dry; no rash  NERVOUS SYSTEM:  Awake and alert; Oriented  x 2, patient's baseline ; no new deficits    _________________________________________________  LABS:                        8.8    8.05  )-----------( 232      ( 17 Dec 2020 07:01 )             28.4     12-17    131<L>  |  98  |  16  ----------------------------<  153<H>  3.5   |  22  |  0.51    Ca    8.2<L>      17 Dec 2020 07:01  Mg     1.7     12-16    TPro  6.0  /  Alb  1.8<L>  /  TBili  0.6  /  DBili  x   /  AST  32  /  ALT  23  /  AlkPhos  301<H>  12-16        CAPILLARY BLOOD GLUCOSE      POCT Blood Glucose.: 170 mg/dL (17 Dec 2020 07:46)  POCT Blood Glucose.: 286 mg/dL (16 Dec 2020 21:22)  POCT Blood Glucose.: 212 mg/dL (16 Dec 2020 18:03)  POCT Blood Glucose.: 167 mg/dL (16 Dec 2020 16:42)  POCT Blood Glucose.: 200 mg/dL (16 Dec 2020 12:55)        RADIOLOGY & ADDITIONAL TESTS:    Imaging  Reviewed:  YES  < from: Xray Chest 1 View- PORTABLE-Urgent (12.11.20 @ 03:20) >    IMPRESSION:    Pulmonary vascular congestion and bilateral pleural effusions suggestive of pulmonary edema. Patchy airspace infiltrates may be on the basis of pulmonary edema or Covid pneumonia.    < from: CT Angio Chest w/ IV Cont (12.11.20 @ 09:58) >  IMPRESSION:    Absence of enteric contrast limits evaluation of the GI tract.    No evidence of pulmonary embolism.    Moderate bilateral pleural effusions with underlying compressive atelectasis lower lobes. Mild pulmonary edema. Right upper lobe groundglass opacity may be on the basis of pulmonary edema or pneumonia.    Soft tissue extending along the right lower lobe pulmonary veins may represent infiltrate or tumor.    Mediastinal and hilar adenopathy secondary to neoplasm or infection.    Cardiomegaly. Ectatic ascending aorta at 3.2 cm.    Trace pericholecystic fluid. Recommend abdominal ultrasound to evaluate for acute cholecystitis.    Nonspecific prominent perigastric lymph nodes.      < from: US Duplex Venous Lower Ext Complete, Bilateral (12.11.20 @ 14:37) >    IMPRESSION:  No evidence of deep venous thrombosis in either lower extremity.      < from: US Hepatic & Pancreatic (12.11.20 @ 15:16) >    IMPRESSION:  Nonspecific trace pericholecystic fluid.    Enlarged liver          Consultant(s) Notes Reviewed:   YES      Plan of care was discussed with patient and /or primary care giver; all questions and concerns were addressed

## 2020-12-18 NOTE — PROGRESS NOTE ADULT - NUTRITIONAL ASSESSMENT
This patient has been assessed with a concern for Malnutrition and has been determined to have a diagnosis/diagnoses of Severe protein-calorie malnutrition.    This patient is being managed with:   Diet Clear Liquid-  Entered: Dec 16 2020 10:46AM    Diet NPO after Midnight-     NPO Start Date: 16-Dec-2020   NPO Start Time: 23:59  Entered: Dec 16 2020 10:44AM    The following pending diet order is being considered for treatment of Severe protein-calorie malnutrition:  Diet Mechanical Soft-  Consistent Carbohydrate {No Snacks}  DASH/TLC {Sodium & Cholesterol Restricted}  Supplement Feeding Modality:  Oral  Glucerna Shake Cans or Servings Per Day:  1       Frequency:  Daily  Entered: Dec 16 2020  9:18AM  
This patient has been assessed with a concern for Malnutrition and has been determined to have a diagnosis/diagnoses of Severe protein-calorie malnutrition.    This patient is being managed with:   Diet Mechanical Soft-  Consistent Carbohydrate {No Snacks}  DASH/TLC {Sodium & Cholesterol Restricted}  Supplement Feeding Modality:  Oral  Glucerna Shake Cans or Servings Per Day:  1       Frequency:  Daily  Entered: Dec 16 2020  9:18AM    
This patient has been assessed with a concern for Malnutrition and has been determined to have a diagnosis/diagnoses of Severe protein-calorie malnutrition.    This patient is being managed with:   Diet Clear Liquid-  Entered: Dec 16 2020 10:46AM    Diet NPO after Midnight-     NPO Start Date: 16-Dec-2020   NPO Start Time: 23:59  Entered: Dec 16 2020 10:44AM    The following pending diet order is being considered for treatment of Severe protein-calorie malnutrition:  Diet Mechanical Soft-  Consistent Carbohydrate {No Snacks}  DASH/TLC {Sodium & Cholesterol Restricted}  Supplement Feeding Modality:  Oral  Glucerna Shake Cans or Servings Per Day:  1       Frequency:  Daily  Entered: Dec 16 2020  9:18AM  
This patient has been assessed with a concern for Malnutrition and has been determined to have a diagnosis/diagnoses of Severe protein-calorie malnutrition.    This patient is being managed with:   Diet Mechanical Soft-  Consistent Carbohydrate {No Snacks}  DASH/TLC {Sodium & Cholesterol Restricted}  Supplement Feeding Modality:  Oral  Glucerna Shake Cans or Servings Per Day:  1       Frequency:  Daily  Entered: Dec 16 2020  9:18AM

## 2020-12-18 NOTE — PROGRESS NOTE ADULT - GASTROINTESTINAL DETAILS
soft/nontender/no distention/no masses palpable/bowel sounds normal/no bruit/no rebound tenderness/no guarding/no rigidity
soft/nontender/no distention/no masses palpable/bowel sounds normal/no bruit/no guarding/no rigidity

## 2020-12-18 NOTE — PROGRESS NOTE ADULT - PROBLEM SELECTOR PLAN 1
-p/w worsening sob in setting of acute on chronic diastolic heart failure   - CXR with bilateral pleural effusions c/w pulmonary vascular congestion  - type II MI likely in the setting of demand ischemia, troponin downtrended 2.430 ---> 0.728  -Echo with mod to severe MR, grade II diastolic dysfunction and HFpEF of 55%  -cont Lasix, beta blockade, statin and ASA   - monitor electrolytes given diuresis, replete as necessary   - strict I's and O's  - daily weights   - Cardiology Dr. Montes

## 2020-12-18 NOTE — PROGRESS NOTE ADULT - NEGATIVE MUSCULOSKELETAL SYMPTOMS
no muscle cramps/no stiffness/no neck pain/no arm pain L/no arm pain R/no back pain/no leg pain L/no leg pain R
no muscle cramps/no stiffness/no neck pain/no arm pain L/no arm pain R/no back pain/no leg pain L/no leg pain R

## 2020-12-18 NOTE — PROGRESS NOTE ADULT - NEGATIVE SKIN SYMPTOMS
no rash/no itching/no dryness/no brittle nails/no pitted nails/no hair loss
no rash/no itching/no dryness/no brittle nails/no pitted nails/no hair loss

## 2020-12-18 NOTE — PROGRESS NOTE ADULT - PROVIDER SPECIALTY LIST ADULT
Cardiology
Gastroenterology
Internal Medicine
Cardiology
Cardiology
Internal Medicine
Internal Medicine
Gastroenterology

## 2020-12-18 NOTE — PROGRESS NOTE ADULT - PROBLEM SELECTOR PLAN 9
discharge today  daughter( Cordell)  wants to set up the transportation when pt is medically optimized for d/c

## 2020-12-18 NOTE — PROGRESS NOTE ADULT - NEGATIVE GENERAL GENITOURINARY SYMPTOMS
no hematuria/no renal colic/no flank pain L/no flank pain R/no dysuria
no hematuria/no renal colic/no flank pain L/no flank pain R/no dysuria

## 2020-12-18 NOTE — PROGRESS NOTE ADULT - NEGATIVE PSYCHIATRIC SYMPTOMS
no suicidal ideation/no depression/no anxiety/no insomnia/no memory loss/no paranoia/no mood swings/no agitation
no suicidal ideation/no depression/no anxiety/no insomnia/no memory loss/no paranoia/no mood swings/no agitation

## 2020-12-18 NOTE — PROGRESS NOTE ADULT - REASON FOR ADMISSION
Shortness of Breath
Shortness of Breath
EGD with biopsy
Shortness of Breath
Colonoscopy with biopsy
Shortness of Breath

## 2020-12-18 NOTE — PROGRESS NOTE ADULT - PROBLEM SELECTOR PLAN 3
- microcytic, likely multifactorial in the setting of iron deficiency and chronic disease given elevated TIBC, ferritin and low iron levels   -hemoglobin stable s/p 1 unit PRBCs and iron supplementation  -hemodynamically stable, no bleeding diathesis    -CT abd and pelvis with no acute pathology   -EGD/Colonoscopy Findings: gastric ulcer (no stigmata of bleeding), gastritis, hiatal hernia, and esophagitis  - 40 mg Protonix started   - will need to f/u EGD biopsy as OP, H. Pylori results

## 2020-12-18 NOTE — PROGRESS NOTE ADULT - NEUROLOGICAL DETAILS
responds to pain/responds to verbal commands/sensation intact/cranial nerves intact
responds to pain/responds to verbal commands/sensation intact/cranial nerves intact

## 2020-12-18 NOTE — PROGRESS NOTE ADULT - NEGATIVE GASTROINTESTINAL SYMPTOMS
no nausea/no diarrhea/no melena/no hematochezia/no steatorrhea/no jaundice/no hiccoughs
no nausea/no diarrhea/no melena/no hematochezia/no steatorrhea/no jaundice/no hiccoughs

## 2020-12-18 NOTE — PROGRESS NOTE ADULT - NEGATIVE ENMT SYMPTOMS
no hearing difficulty/no ear pain/no tinnitus/no vertigo/no sinus symptoms/no nasal congestion/no nasal discharge/no nasal obstruction/no post-nasal discharge/no nose bleeds/no gum bleeding/no dry mouth/no throat pain/no dysphagia
no hearing difficulty/no ear pain/no tinnitus/no vertigo/no sinus symptoms/no nasal congestion/no nasal discharge/no nasal obstruction/no post-nasal discharge/no nose bleeds/no gum bleeding/no dry mouth/no throat pain/no dysphagia

## 2021-01-01 ENCOUNTER — NON-APPOINTMENT (OUTPATIENT)
Age: 86
End: 2021-01-01

## 2021-01-01 ENCOUNTER — APPOINTMENT (OUTPATIENT)
Dept: INTERNAL MEDICINE | Facility: CLINIC | Age: 86
End: 2021-01-01
Payer: MEDICARE

## 2021-01-01 ENCOUNTER — INPATIENT (INPATIENT)
Facility: HOSPITAL | Age: 86
LOS: 0 days | DRG: 951 | End: 2021-01-24
Attending: INTERNAL MEDICINE | Admitting: INTERNAL MEDICINE
Payer: MEDICARE

## 2021-01-01 VITALS
SYSTOLIC BLOOD PRESSURE: 188 MMHG | HEIGHT: 58 IN | OXYGEN SATURATION: 99 % | WEIGHT: 97 LBS | HEART RATE: 120 BPM | DIASTOLIC BLOOD PRESSURE: 90 MMHG | TEMPERATURE: 97 F | RESPIRATION RATE: 16 BRPM

## 2021-01-01 VITALS
HEART RATE: 94 BPM | BODY MASS INDEX: 24.19 KG/M2 | TEMPERATURE: 97.3 F | HEIGHT: 59 IN | WEIGHT: 120 LBS | OXYGEN SATURATION: 97 % | DIASTOLIC BLOOD PRESSURE: 69 MMHG | SYSTOLIC BLOOD PRESSURE: 148 MMHG | RESPIRATION RATE: 16 BRPM

## 2021-01-01 VITALS
OXYGEN SATURATION: 100 % | HEART RATE: 120 BPM | DIASTOLIC BLOOD PRESSURE: 48 MMHG | RESPIRATION RATE: 40 BRPM | TEMPERATURE: 97 F | SYSTOLIC BLOOD PRESSURE: 95 MMHG

## 2021-01-01 DIAGNOSIS — D50.9 IRON DEFICIENCY ANEMIA, UNSPECIFIED: ICD-10-CM

## 2021-01-01 DIAGNOSIS — I73.9 PERIPHERAL VASCULAR DISEASE, UNSPECIFIED: ICD-10-CM

## 2021-01-01 DIAGNOSIS — K63.89 OTHER SPECIFIED DISEASES OF INTESTINE: ICD-10-CM

## 2021-01-01 DIAGNOSIS — I34.0 NONRHEUMATIC MITRAL (VALVE) INSUFFICIENCY: ICD-10-CM

## 2021-01-01 DIAGNOSIS — I10 ESSENTIAL (PRIMARY) HYPERTENSION: ICD-10-CM

## 2021-01-01 DIAGNOSIS — I50.32 CHRONIC DIASTOLIC (CONGESTIVE) HEART FAILURE: ICD-10-CM

## 2021-01-01 DIAGNOSIS — Z23 ENCOUNTER FOR IMMUNIZATION: ICD-10-CM

## 2021-01-01 DIAGNOSIS — F03.90 UNSPECIFIED DEMENTIA, UNSPECIFIED SEVERITY, WITHOUT BEHAVIORAL DISTURBANCE, PSYCHOTIC DISTURBANCE, MOOD DISTURBANCE, AND ANXIETY: ICD-10-CM

## 2021-01-01 DIAGNOSIS — Z71.89 OTHER SPECIFIED COUNSELING: ICD-10-CM

## 2021-01-01 DIAGNOSIS — G30.9 ALZHEIMER'S DISEASE, UNSPECIFIED: ICD-10-CM

## 2021-01-01 DIAGNOSIS — F02.80 ALZHEIMER'S DISEASE, UNSPECIFIED: ICD-10-CM

## 2021-01-01 DIAGNOSIS — I50.30 UNSPECIFIED DIASTOLIC (CONGESTIVE) HEART FAILURE: ICD-10-CM

## 2021-01-01 DIAGNOSIS — Z29.9 ENCOUNTER FOR PROPHYLACTIC MEASURES, UNSPECIFIED: ICD-10-CM

## 2021-01-01 DIAGNOSIS — E87.1 HYPO-OSMOLALITY AND HYPONATREMIA: ICD-10-CM

## 2021-01-01 DIAGNOSIS — E11.9 TYPE 2 DIABETES MELLITUS W/OUT COMPLICATIONS: ICD-10-CM

## 2021-01-01 DIAGNOSIS — E78.5 HYPERLIPIDEMIA, UNSPECIFIED: ICD-10-CM

## 2021-01-01 DIAGNOSIS — D50.0 IRON DEFICIENCY ANEMIA SECONDARY TO BLOOD LOSS (CHRONIC): ICD-10-CM

## 2021-01-01 DIAGNOSIS — E11.65 TYPE 2 DIABETES MELLITUS WITH HYPERGLYCEMIA: ICD-10-CM

## 2021-01-01 DIAGNOSIS — K25.7 CHRONIC GASTRIC ULCER W/OUT HEMORRHAGE OR PERFORATION: ICD-10-CM

## 2021-01-01 DIAGNOSIS — I63.81 OTHER CEREBRAL INFARCTION DUE TO OCCLUSION OR STENOSIS OF SMALL ARTERY: ICD-10-CM

## 2021-01-01 LAB
A1C WITH ESTIMATED AVERAGE GLUCOSE RESULT: 8.4 % — HIGH (ref 4–5.6)
ALBUMIN SERPL ELPH-MCNC: 2 G/DL — LOW (ref 3.5–5)
ALBUMIN SERPL ELPH-MCNC: 2.9 G/DL — LOW (ref 3.5–5)
ALP SERPL-CCNC: 232 U/L — HIGH (ref 40–120)
ALP SERPL-CCNC: 371 U/L — HIGH (ref 40–120)
ALT FLD-CCNC: 16 U/L DA — SIGNIFICANT CHANGE UP (ref 10–60)
ALT FLD-CCNC: 20 U/L DA — SIGNIFICANT CHANGE UP (ref 10–60)
ANION GAP SERPL CALC-SCNC: 11 MMOL/L — SIGNIFICANT CHANGE UP (ref 5–17)
ANION GAP SERPL CALC-SCNC: 14 MMOL/L — SIGNIFICANT CHANGE UP (ref 5–17)
APPEARANCE UR: CLEAR — SIGNIFICANT CHANGE UP
AST SERPL-CCNC: 16 U/L — SIGNIFICANT CHANGE UP (ref 10–40)
AST SERPL-CCNC: 19 U/L — SIGNIFICANT CHANGE UP (ref 10–40)
BASE EXCESS BLDV CALC-SCNC: -1.1 MMOL/L — SIGNIFICANT CHANGE UP (ref -2–2)
BASOPHILS # BLD AUTO: 0 K/UL — SIGNIFICANT CHANGE UP (ref 0–0.2)
BASOPHILS NFR BLD AUTO: 0 % — SIGNIFICANT CHANGE UP (ref 0–2)
BILIRUB SERPL-MCNC: 0.6 MG/DL — SIGNIFICANT CHANGE UP (ref 0.2–1.2)
BILIRUB SERPL-MCNC: 0.7 MG/DL — SIGNIFICANT CHANGE UP (ref 0.2–1.2)
BILIRUB UR-MCNC: NEGATIVE — SIGNIFICANT CHANGE UP
BUN SERPL-MCNC: 23 MG/DL — HIGH (ref 7–18)
BUN SERPL-MCNC: 27 MG/DL — HIGH (ref 7–18)
CALCIUM SERPL-MCNC: 8.4 MG/DL — SIGNIFICANT CHANGE UP (ref 8.4–10.5)
CALCIUM SERPL-MCNC: 9.5 MG/DL — SIGNIFICANT CHANGE UP (ref 8.4–10.5)
CHLORIDE SERPL-SCNC: 104 MMOL/L — SIGNIFICANT CHANGE UP (ref 96–108)
CHLORIDE SERPL-SCNC: 94 MMOL/L — LOW (ref 96–108)
CHOLEST SERPL-MCNC: 70 MG/DL — SIGNIFICANT CHANGE UP
CO2 SERPL-SCNC: 24 MMOL/L — SIGNIFICANT CHANGE UP (ref 22–31)
CO2 SERPL-SCNC: 25 MMOL/L — SIGNIFICANT CHANGE UP (ref 22–31)
COLOR SPEC: YELLOW — SIGNIFICANT CHANGE UP
CREAT SERPL-MCNC: 0.76 MG/DL — SIGNIFICANT CHANGE UP (ref 0.5–1.3)
CREAT SERPL-MCNC: 0.85 MG/DL — SIGNIFICANT CHANGE UP (ref 0.5–1.3)
CRP SERPL-MCNC: 4.73 MG/DL — HIGH (ref 0–0.4)
CULTURE RESULTS: SIGNIFICANT CHANGE UP
D DIMER BLD IA.RAPID-MCNC: 2057 NG/ML DDU — HIGH
DIFF PNL FLD: NEGATIVE — SIGNIFICANT CHANGE UP
EOSINOPHIL # BLD AUTO: 0 K/UL — SIGNIFICANT CHANGE UP (ref 0–0.5)
EOSINOPHIL NFR BLD AUTO: 0 % — SIGNIFICANT CHANGE UP (ref 0–6)
EPI CELLS # UR: SIGNIFICANT CHANGE UP /HPF
ESTIMATED AVERAGE GLUCOSE: 194 MG/DL — HIGH (ref 68–114)
FERRITIN SERPL-MCNC: 958 NG/ML — HIGH (ref 15–150)
GLUCOSE BLDC GLUCOMTR-MCNC: 111 MG/DL — HIGH (ref 70–99)
GLUCOSE BLDC GLUCOMTR-MCNC: 145 MG/DL — HIGH (ref 70–99)
GLUCOSE BLDC GLUCOMTR-MCNC: 166 MG/DL — HIGH (ref 70–99)
GLUCOSE BLDC GLUCOMTR-MCNC: 193 MG/DL — HIGH (ref 70–99)
GLUCOSE BLDC GLUCOMTR-MCNC: 222 MG/DL — HIGH (ref 70–99)
GLUCOSE BLDC GLUCOMTR-MCNC: 353 MG/DL — HIGH (ref 70–99)
GLUCOSE SERPL-MCNC: 141 MG/DL — HIGH (ref 70–99)
GLUCOSE SERPL-MCNC: 459 MG/DL — CRITICAL HIGH (ref 70–99)
GLUCOSE UR QL: 1000 MG/DL
HCO3 BLDV-SCNC: 24 MMOL/L — SIGNIFICANT CHANGE UP (ref 21–29)
HCT VFR BLD CALC: 27.7 % — LOW (ref 34.5–45)
HCT VFR BLD CALC: 37.7 % — SIGNIFICANT CHANGE UP (ref 34.5–45)
HDLC SERPL-MCNC: 43 MG/DL — LOW
HGB BLD-MCNC: 11 G/DL — LOW (ref 11.5–15.5)
HGB BLD-MCNC: 8.1 G/DL — LOW (ref 11.5–15.5)
HOROWITZ INDEX BLDV+IHG-RTO: 21 — SIGNIFICANT CHANGE UP
KETONES UR-MCNC: ABNORMAL
LACTATE SERPL-SCNC: 1.6 MMOL/L — SIGNIFICANT CHANGE UP (ref 0.7–2)
LACTATE SERPL-SCNC: 2.6 MMOL/L — HIGH (ref 0.7–2)
LACTATE SERPL-SCNC: 4.3 MMOL/L — CRITICAL HIGH (ref 0.7–2)
LEUKOCYTE ESTERASE UR-ACNC: NEGATIVE — SIGNIFICANT CHANGE UP
LIDOCAIN IGE QN: 201 U/L — SIGNIFICANT CHANGE UP (ref 73–393)
LIPID PNL WITH DIRECT LDL SERPL: 14 MG/DL — SIGNIFICANT CHANGE UP
LYMPHOCYTES # BLD AUTO: 0.33 K/UL — LOW (ref 1–3.3)
LYMPHOCYTES # BLD AUTO: 3 % — LOW (ref 13–44)
MAGNESIUM SERPL-MCNC: 1.5 MG/DL — LOW (ref 1.6–2.6)
MANUAL SMEAR VERIFICATION: SIGNIFICANT CHANGE UP
MCHC RBC-ENTMCNC: 22.4 PG — LOW (ref 27–34)
MCHC RBC-ENTMCNC: 22.4 PG — LOW (ref 27–34)
MCHC RBC-ENTMCNC: 29.2 GM/DL — LOW (ref 32–36)
MCHC RBC-ENTMCNC: 29.2 GM/DL — LOW (ref 32–36)
MCV RBC AUTO: 76.5 FL — LOW (ref 80–100)
MCV RBC AUTO: 76.6 FL — LOW (ref 80–100)
MONOCYTES # BLD AUTO: 0 K/UL — SIGNIFICANT CHANGE UP (ref 0–0.9)
MONOCYTES NFR BLD AUTO: 0 % — LOW (ref 2–14)
NEUTROPHILS # BLD AUTO: 10.52 K/UL — HIGH (ref 1.8–7.4)
NEUTROPHILS NFR BLD AUTO: 96 % — HIGH (ref 43–77)
NITRITE UR-MCNC: NEGATIVE — SIGNIFICANT CHANGE UP
NON HDL CHOLESTEROL: 27 MG/DL — SIGNIFICANT CHANGE UP
NRBC # BLD: 0 /100 WBCS — SIGNIFICANT CHANGE UP (ref 0–0)
NRBC # BLD: 0 /100 — SIGNIFICANT CHANGE UP (ref 0–0)
NT-PROBNP SERPL-SCNC: 5033 PG/ML — HIGH (ref 0–450)
OVALOCYTES BLD QL SMEAR: SLIGHT — SIGNIFICANT CHANGE UP
PCO2 BLDV: 48 MMHG — SIGNIFICANT CHANGE UP (ref 35–50)
PH BLDV: 7.33 — LOW (ref 7.35–7.45)
PH UR: 6.5 — SIGNIFICANT CHANGE UP (ref 5–8)
PHOSPHATE SERPL-MCNC: 4.9 MG/DL — HIGH (ref 2.5–4.5)
PLAT MORPH BLD: NORMAL — SIGNIFICANT CHANGE UP
PLATELET # BLD AUTO: 350 K/UL — SIGNIFICANT CHANGE UP (ref 150–400)
PLATELET # BLD AUTO: 422 K/UL — HIGH (ref 150–400)
PO2 BLDV: 43 MMHG — SIGNIFICANT CHANGE UP (ref 25–45)
POIKILOCYTOSIS BLD QL AUTO: SLIGHT — SIGNIFICANT CHANGE UP
POTASSIUM SERPL-MCNC: 4 MMOL/L — SIGNIFICANT CHANGE UP (ref 3.5–5.3)
POTASSIUM SERPL-MCNC: 4.5 MMOL/L — SIGNIFICANT CHANGE UP (ref 3.5–5.3)
POTASSIUM SERPL-SCNC: 4 MMOL/L — SIGNIFICANT CHANGE UP (ref 3.5–5.3)
POTASSIUM SERPL-SCNC: 4.5 MMOL/L — SIGNIFICANT CHANGE UP (ref 3.5–5.3)
PROT SERPL-MCNC: 6.5 G/DL — SIGNIFICANT CHANGE UP (ref 6–8.3)
PROT SERPL-MCNC: 8.6 G/DL — HIGH (ref 6–8.3)
PROT UR-MCNC: 100
RAPID RVP RESULT: SIGNIFICANT CHANGE UP
RBC # BLD: 3.62 M/UL — LOW (ref 3.8–5.2)
RBC # BLD: 4.92 M/UL — SIGNIFICANT CHANGE UP (ref 3.8–5.2)
RBC # FLD: 17.6 % — HIGH (ref 10.3–14.5)
RBC # FLD: 18.4 % — HIGH (ref 10.3–14.5)
RBC BLD AUTO: ABNORMAL
RBC CASTS # UR COMP ASSIST: SIGNIFICANT CHANGE UP /HPF (ref 0–2)
SAO2 % BLDV: 65 % — LOW (ref 67–88)
SARS-COV-2 RNA SPEC QL NAA+PROBE: SIGNIFICANT CHANGE UP
SODIUM SERPL-SCNC: 132 MMOL/L — LOW (ref 135–145)
SODIUM SERPL-SCNC: 140 MMOL/L — SIGNIFICANT CHANGE UP (ref 135–145)
SP GR SPEC: 1.01 — SIGNIFICANT CHANGE UP (ref 1.01–1.02)
SPECIMEN SOURCE: SIGNIFICANT CHANGE UP
TRIGL SERPL-MCNC: 65 MG/DL — SIGNIFICANT CHANGE UP
TROPONIN I SERPL-MCNC: 0.02 NG/ML — SIGNIFICANT CHANGE UP (ref 0–0.04)
TSH SERPL-MCNC: 4.41 UU/ML — SIGNIFICANT CHANGE UP (ref 0.34–4.82)
UROBILINOGEN FLD QL: NEGATIVE — SIGNIFICANT CHANGE UP
VARIANT LYMPHS # BLD: 1 % — SIGNIFICANT CHANGE UP (ref 0–6)
WBC # BLD: 10.96 K/UL — HIGH (ref 3.8–10.5)
WBC # BLD: 22.69 K/UL — HIGH (ref 3.8–10.5)
WBC # FLD AUTO: 10.96 K/UL — HIGH (ref 3.8–10.5)
WBC # FLD AUTO: 22.69 K/UL — HIGH (ref 3.8–10.5)
WBC UR QL: SIGNIFICANT CHANGE UP /HPF (ref 0–5)

## 2021-01-01 PROCEDURE — 74177 CT ABD & PELVIS W/CONTRAST: CPT | Mod: 26

## 2021-01-01 PROCEDURE — 99285 EMERGENCY DEPT VISIT HI MDM: CPT | Mod: 25

## 2021-01-01 PROCEDURE — 0225U NFCT DS DNA&RNA 21 SARSCOV2: CPT

## 2021-01-01 PROCEDURE — 96375 TX/PRO/DX INJ NEW DRUG ADDON: CPT

## 2021-01-01 PROCEDURE — 83036 HEMOGLOBIN GLYCOSYLATED A1C: CPT

## 2021-01-01 PROCEDURE — 71275 CT ANGIOGRAPHY CHEST: CPT | Mod: 26

## 2021-01-01 PROCEDURE — 80053 COMPREHEN METABOLIC PANEL: CPT

## 2021-01-01 PROCEDURE — 85027 COMPLETE CBC AUTOMATED: CPT

## 2021-01-01 PROCEDURE — 83690 ASSAY OF LIPASE: CPT

## 2021-01-01 PROCEDURE — 87040 BLOOD CULTURE FOR BACTERIA: CPT

## 2021-01-01 PROCEDURE — 87086 URINE CULTURE/COLONY COUNT: CPT

## 2021-01-01 PROCEDURE — 96361 HYDRATE IV INFUSION ADD-ON: CPT

## 2021-01-01 PROCEDURE — 71275 CT ANGIOGRAPHY CHEST: CPT

## 2021-01-01 PROCEDURE — 85025 COMPLETE CBC W/AUTO DIFF WBC: CPT

## 2021-01-01 PROCEDURE — 93005 ELECTROCARDIOGRAM TRACING: CPT

## 2021-01-01 PROCEDURE — 99072 ADDL SUPL MATRL&STAF TM PHE: CPT

## 2021-01-01 PROCEDURE — 74177 CT ABD & PELVIS W/CONTRAST: CPT

## 2021-01-01 PROCEDURE — 36415 COLL VENOUS BLD VENIPUNCTURE: CPT

## 2021-01-01 PROCEDURE — 99231 SBSQ HOSP IP/OBS SF/LOW 25: CPT

## 2021-01-01 PROCEDURE — 82728 ASSAY OF FERRITIN: CPT

## 2021-01-01 PROCEDURE — G0008: CPT

## 2021-01-01 PROCEDURE — 96374 THER/PROPH/DIAG INJ IV PUSH: CPT

## 2021-01-01 PROCEDURE — 80061 LIPID PANEL: CPT

## 2021-01-01 PROCEDURE — 71045 X-RAY EXAM CHEST 1 VIEW: CPT | Mod: 26

## 2021-01-01 PROCEDURE — 93010 ELECTROCARDIOGRAM REPORT: CPT

## 2021-01-01 PROCEDURE — 82803 BLOOD GASES ANY COMBINATION: CPT

## 2021-01-01 PROCEDURE — 84484 ASSAY OF TROPONIN QUANT: CPT

## 2021-01-01 PROCEDURE — 84100 ASSAY OF PHOSPHORUS: CPT

## 2021-01-01 PROCEDURE — 90662 IIV NO PRSV INCREASED AG IM: CPT

## 2021-01-01 PROCEDURE — 99214 OFFICE O/P EST MOD 30 MIN: CPT | Mod: 25

## 2021-01-01 PROCEDURE — 83880 ASSAY OF NATRIURETIC PEPTIDE: CPT

## 2021-01-01 PROCEDURE — 96372 THER/PROPH/DIAG INJ SC/IM: CPT

## 2021-01-01 PROCEDURE — 83735 ASSAY OF MAGNESIUM: CPT

## 2021-01-01 PROCEDURE — 86140 C-REACTIVE PROTEIN: CPT

## 2021-01-01 PROCEDURE — 81001 URINALYSIS AUTO W/SCOPE: CPT

## 2021-01-01 PROCEDURE — 84443 ASSAY THYROID STIM HORMONE: CPT

## 2021-01-01 PROCEDURE — 85379 FIBRIN DEGRADATION QUANT: CPT

## 2021-01-01 PROCEDURE — 83605 ASSAY OF LACTIC ACID: CPT

## 2021-01-01 PROCEDURE — 71045 X-RAY EXAM CHEST 1 VIEW: CPT

## 2021-01-01 PROCEDURE — 82962 GLUCOSE BLOOD TEST: CPT

## 2021-01-01 RX ORDER — METOPROLOL TARTRATE 50 MG
2.5 TABLET ORAL ONCE
Refills: 0 | Status: COMPLETED | OUTPATIENT
Start: 2021-01-01 | End: 2021-01-01

## 2021-01-01 RX ORDER — LOSARTAN POTASSIUM 50 MG/1
50 TABLET, FILM COATED ORAL DAILY
Qty: 90 | Refills: 1 | Status: DISCONTINUED | COMMUNITY
End: 2021-01-01

## 2021-01-01 RX ORDER — ASPIRIN/CALCIUM CARB/MAGNESIUM 324 MG
81 TABLET ORAL DAILY
Refills: 0 | Status: DISCONTINUED | OUTPATIENT
Start: 2021-01-01 | End: 2021-01-01

## 2021-01-01 RX ORDER — MORPHINE SULFATE 50 MG/1
2 CAPSULE, EXTENDED RELEASE ORAL EVERY 4 HOURS
Refills: 0 | Status: DISCONTINUED | OUTPATIENT
Start: 2021-01-01 | End: 2021-01-01

## 2021-01-01 RX ORDER — PIPERACILLIN AND TAZOBACTAM 4; .5 G/20ML; G/20ML
3.38 INJECTION, POWDER, LYOPHILIZED, FOR SOLUTION INTRAVENOUS ONCE
Refills: 0 | Status: COMPLETED | OUTPATIENT
Start: 2021-01-01 | End: 2021-01-01

## 2021-01-01 RX ORDER — DONEPEZIL HYDROCHLORIDE 10 MG/1
1 TABLET, FILM COATED ORAL
Qty: 0 | Refills: 0 | DISCHARGE

## 2021-01-01 RX ORDER — SODIUM CHLORIDE 9 MG/ML
1000 INJECTION INTRAMUSCULAR; INTRAVENOUS; SUBCUTANEOUS
Refills: 0 | Status: DISCONTINUED | OUTPATIENT
Start: 2021-01-01 | End: 2021-01-01

## 2021-01-01 RX ORDER — CHLORHEXIDINE GLUCONATE 4 %
325 (65 FE) LIQUID (ML) TOPICAL
Qty: 30 | Refills: 2 | Status: ACTIVE | COMMUNITY
Start: 1900-01-01 | End: 1900-01-01

## 2021-01-01 RX ORDER — SODIUM CHLORIDE 9 MG/ML
500 INJECTION INTRAMUSCULAR; INTRAVENOUS; SUBCUTANEOUS ONCE
Refills: 0 | Status: COMPLETED | OUTPATIENT
Start: 2021-01-01 | End: 2021-01-01

## 2021-01-01 RX ORDER — SITAGLIPTIN AND METFORMIN HYDROCHLORIDE 500; 50 MG/1; MG/1
1 TABLET, FILM COATED ORAL
Qty: 0 | Refills: 0 | DISCHARGE

## 2021-01-01 RX ORDER — PANTOPRAZOLE 40 MG/1
40 TABLET, DELAYED RELEASE ORAL DAILY
Refills: 0 | Status: DISCONTINUED | COMMUNITY
End: 2021-01-01

## 2021-01-01 RX ORDER — MORPHINE SULFATE 50 MG/1
0.5 CAPSULE, EXTENDED RELEASE ORAL
Refills: 0 | Status: DISCONTINUED | OUTPATIENT
Start: 2021-01-01 | End: 2021-01-01

## 2021-01-01 RX ORDER — FUROSEMIDE 20 MG/1
20 TABLET ORAL
Qty: 30 | Refills: 2 | Status: ACTIVE | COMMUNITY
Start: 1900-01-01 | End: 1900-01-01

## 2021-01-01 RX ORDER — ATORVASTATIN CALCIUM 80 MG/1
80 TABLET, FILM COATED ORAL
Qty: 30 | Refills: 2 | Status: DISCONTINUED | COMMUNITY
Start: 2020-01-01 | End: 2021-01-01

## 2021-01-01 RX ORDER — METOPROLOL TARTRATE 50 MG
50 TABLET ORAL
Refills: 0 | Status: DISCONTINUED | OUTPATIENT
Start: 2021-01-01 | End: 2021-01-01

## 2021-01-01 RX ORDER — ALCOHOL ANTISEPTIC PADS
PADS, MEDICATED (EA) TOPICAL
Qty: 90 | Refills: 2 | Status: ACTIVE | COMMUNITY
Start: 2021-01-01 | End: 1900-01-01

## 2021-01-01 RX ORDER — BLOOD SUGAR DIAGNOSTIC
STRIP MISCELLANEOUS
Qty: 90 | Refills: 2 | Status: ACTIVE | COMMUNITY
Start: 2021-01-01 | End: 1900-01-01

## 2021-01-01 RX ORDER — FUROSEMIDE 40 MG
20 TABLET ORAL DAILY
Refills: 0 | Status: DISCONTINUED | OUTPATIENT
Start: 2021-01-01 | End: 2021-01-01

## 2021-01-01 RX ORDER — MEMANTINE HYDROCHLORIDE 10 MG/1
1 TABLET ORAL
Qty: 0 | Refills: 0 | DISCHARGE

## 2021-01-01 RX ORDER — SENNOSIDES 8.6 MG TABLETS 8.6 MG/1
TABLET ORAL AT BEDTIME
Refills: 0 | Status: DISCONTINUED | COMMUNITY
End: 2021-01-01

## 2021-01-01 RX ORDER — HYDRALAZINE HYDROCHLORIDE 25 MG/1
25 TABLET ORAL 3 TIMES DAILY
Refills: 0 | Status: DISCONTINUED | COMMUNITY
End: 2021-01-01

## 2021-01-01 RX ORDER — ISOPROPYL ALCOHOL 70 ML/100ML
SWAB TOPICAL
Qty: 90 | Refills: 3 | Status: ACTIVE | COMMUNITY
Start: 2021-01-01 | End: 1900-01-01

## 2021-01-01 RX ORDER — MEMANTINE HYDROCHLORIDE 10 MG/1
10 TABLET ORAL DAILY
Refills: 0 | Status: DISCONTINUED | OUTPATIENT
Start: 2021-01-01 | End: 2021-01-01

## 2021-01-01 RX ORDER — MORPHINE SULFATE 50 MG/1
1 CAPSULE, EXTENDED RELEASE ORAL ONCE
Refills: 0 | Status: DISCONTINUED | OUTPATIENT
Start: 2021-01-01 | End: 2021-01-01

## 2021-01-01 RX ORDER — SENNOSIDES 8.6 MG TABLETS 8.6 MG/1
8.6 TABLET ORAL
Qty: 30 | Refills: 3 | Status: ACTIVE | COMMUNITY
Start: 2021-01-01 | End: 1900-01-01

## 2021-01-01 RX ORDER — DONEPEZIL HYDROCHLORIDE 10 MG/1
5 TABLET, FILM COATED ORAL AT BEDTIME
Refills: 0 | Status: DISCONTINUED | OUTPATIENT
Start: 2021-01-01 | End: 2021-01-01

## 2021-01-01 RX ORDER — SODIUM CHLORIDE 9 MG/ML
1000 INJECTION INTRAMUSCULAR; INTRAVENOUS; SUBCUTANEOUS ONCE
Refills: 0 | Status: COMPLETED | OUTPATIENT
Start: 2021-01-01 | End: 2021-01-01

## 2021-01-01 RX ORDER — AMLODIPINE BESYLATE 10 MG/1
10 TABLET ORAL DAILY
Qty: 30 | Refills: 2 | Status: DISCONTINUED | COMMUNITY
End: 2021-01-01

## 2021-01-01 RX ORDER — ATORVASTATIN CALCIUM 80 MG/1
40 TABLET, FILM COATED ORAL AT BEDTIME
Refills: 0 | Status: DISCONTINUED | OUTPATIENT
Start: 2021-01-01 | End: 2021-01-01

## 2021-01-01 RX ORDER — ATORVASTATIN CALCIUM 80 MG/1
1 TABLET, FILM COATED ORAL
Qty: 0 | Refills: 0 | DISCHARGE

## 2021-01-01 RX ORDER — LOSARTAN POTASSIUM 25 MG/1
25 TABLET, FILM COATED ORAL TWICE DAILY
Qty: 2 | Refills: 2 | Status: ACTIVE | COMMUNITY
Start: 2021-01-01 | End: 1900-01-01

## 2021-01-01 RX ORDER — INSULIN LISPRO 100/ML
VIAL (ML) SUBCUTANEOUS EVERY 6 HOURS
Refills: 0 | Status: DISCONTINUED | OUTPATIENT
Start: 2021-01-01 | End: 2021-01-01

## 2021-01-01 RX ORDER — INSULIN LISPRO 100/ML
5 VIAL (ML) SUBCUTANEOUS ONCE
Refills: 0 | Status: COMPLETED | OUTPATIENT
Start: 2021-01-01 | End: 2021-01-01

## 2021-01-01 RX ORDER — IOHEXOL 300 MG/ML
30 INJECTION, SOLUTION INTRAVENOUS ONCE
Refills: 0 | Status: COMPLETED | OUTPATIENT
Start: 2021-01-01 | End: 2021-01-01

## 2021-01-01 RX ORDER — PANTOPRAZOLE 40 MG/1
40 TABLET, DELAYED RELEASE ORAL DAILY
Qty: 30 | Refills: 2 | Status: ACTIVE | COMMUNITY
Start: 2021-01-01 | End: 1900-01-01

## 2021-01-01 RX ORDER — PANTOPRAZOLE SODIUM 20 MG/1
40 TABLET, DELAYED RELEASE ORAL DAILY
Refills: 0 | Status: DISCONTINUED | OUTPATIENT
Start: 2021-01-01 | End: 2021-01-01

## 2021-01-01 RX ORDER — PIPERACILLIN AND TAZOBACTAM 4; .5 G/20ML; G/20ML
3.38 INJECTION, POWDER, LYOPHILIZED, FOR SOLUTION INTRAVENOUS EVERY 8 HOURS
Refills: 0 | Status: DISCONTINUED | OUTPATIENT
Start: 2021-01-01 | End: 2021-01-01

## 2021-01-01 RX ORDER — ENOXAPARIN SODIUM 100 MG/ML
30 INJECTION SUBCUTANEOUS DAILY
Refills: 0 | Status: DISCONTINUED | OUTPATIENT
Start: 2021-01-01 | End: 2021-01-01

## 2021-01-01 RX ORDER — LOSARTAN POTASSIUM 100 MG/1
25 TABLET, FILM COATED ORAL DAILY
Refills: 0 | Status: DISCONTINUED | OUTPATIENT
Start: 2021-01-01 | End: 2021-01-01

## 2021-01-01 RX ORDER — SENNA PLUS 8.6 MG/1
2 TABLET ORAL AT BEDTIME
Refills: 0 | Status: DISCONTINUED | OUTPATIENT
Start: 2021-01-01 | End: 2021-01-01

## 2021-01-01 RX ADMIN — PIPERACILLIN AND TAZOBACTAM 25 GRAM(S): 4; .5 INJECTION, POWDER, LYOPHILIZED, FOR SOLUTION INTRAVENOUS at 05:36

## 2021-01-01 RX ADMIN — MORPHINE SULFATE 0.5 MILLIGRAM(S): 50 CAPSULE, EXTENDED RELEASE ORAL at 00:25

## 2021-01-01 RX ADMIN — Medication 1: at 17:44

## 2021-01-01 RX ADMIN — SODIUM CHLORIDE 500 MILLILITER(S): 9 INJECTION INTRAMUSCULAR; INTRAVENOUS; SUBCUTANEOUS at 14:31

## 2021-01-01 RX ADMIN — ENOXAPARIN SODIUM 30 MILLIGRAM(S): 100 INJECTION SUBCUTANEOUS at 12:11

## 2021-01-01 RX ADMIN — Medication 5: at 18:01

## 2021-01-01 RX ADMIN — PANTOPRAZOLE SODIUM 40 MILLIGRAM(S): 20 TABLET, DELAYED RELEASE ORAL at 12:11

## 2021-01-01 RX ADMIN — IOHEXOL 30 MILLILITER(S): 300 INJECTION, SOLUTION INTRAVENOUS at 08:18

## 2021-01-01 RX ADMIN — PIPERACILLIN AND TAZOBACTAM 200 GRAM(S): 4; .5 INJECTION, POWDER, LYOPHILIZED, FOR SOLUTION INTRAVENOUS at 12:35

## 2021-01-01 RX ADMIN — Medication 1: at 11:47

## 2021-01-01 RX ADMIN — Medication 2.5 MILLIGRAM(S): at 14:33

## 2021-01-01 RX ADMIN — MORPHINE SULFATE 1 MILLIGRAM(S): 50 CAPSULE, EXTENDED RELEASE ORAL at 22:10

## 2021-01-01 RX ADMIN — SODIUM CHLORIDE 1000 MILLILITER(S): 9 INJECTION INTRAMUSCULAR; INTRAVENOUS; SUBCUTANEOUS at 12:35

## 2021-01-01 RX ADMIN — PIPERACILLIN AND TAZOBACTAM 25 GRAM(S): 4; .5 INJECTION, POWDER, LYOPHILIZED, FOR SOLUTION INTRAVENOUS at 12:40

## 2021-01-01 RX ADMIN — MORPHINE SULFATE 2 MILLIGRAM(S): 50 CAPSULE, EXTENDED RELEASE ORAL at 03:36

## 2021-01-01 RX ADMIN — PIPERACILLIN AND TAZOBACTAM 25 GRAM(S): 4; .5 INJECTION, POWDER, LYOPHILIZED, FOR SOLUTION INTRAVENOUS at 00:26

## 2021-01-01 RX ADMIN — SODIUM CHLORIDE 1000 MILLILITER(S): 9 INJECTION INTRAMUSCULAR; INTRAVENOUS; SUBCUTANEOUS at 08:18

## 2021-01-01 RX ADMIN — Medication 5 UNIT(S): at 11:28

## 2021-01-08 PROBLEM — D50.0 ANEMIA DUE TO BLOOD LOSS, CHRONIC: Status: ACTIVE | Noted: 2021-01-01

## 2021-01-08 PROBLEM — Z23 FLU VACCINE NEED: Status: ACTIVE | Noted: 2021-01-01

## 2021-01-23 NOTE — PROGRESS NOTE ADULT - SUBJECTIVE AND OBJECTIVE BOX
87 yo woman presenting with abdominal pain and poor PO intake, history is limited and obtained with the help of her family over the phone.   On exam - patient is alert, complaining of pain, the abdomen is non-distended, soft, diffuse tenderness, no peritonitis.     CT shows pneumatosis intestinalis involving at least 2/3 of the small bowel with portal venous air. 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 This was present in her last CT from December, when she was treated for anemia, colonoscopy showed ischemic colitis.    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 Exploratory laparotomy with massive small bowel resection will likely not result in meaningful recovery, and bowel resection makes no sense without revascularizing the SMA. I discussed her case with the vascular surgeon on call, who reviewed the imaging and confirms that the SMA cannot be revascularized in the acute setting due to the location of the occlusion, the chronicity and the severe calcifications.     Given the prognosis, the family did not agree to surgery and decided to continue medical management and comfort care.    87 yo woman presenting with abdominal pain and poor PO intake, history is limited and obtained with the help of her family over the phone.   On exam - patient is alert, complaining of pain, the abdomen is non-distended, soft, diffuse tenderness, no peritonitis.     CT shows pneumatosis intestinalis involving at least 2/3 of the small bowel with portal venous air. 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 This was present in her last CT from December, when she was treated for anemia, colonoscopy showed ischemic colitis.    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 Exploratory laparotomy with massive small bowel resection will likely not result in meaningful recovery, and bowel resection makes no sense without revascularizing the SMA. I discussed her case with the vascular surgeon on call, who reviewed the imaging and confirms that the SMA cannot be revascularized in the acute setting due to the location of the occlusion, the chronicity and the severe calcifications.     Given the prognosis, the family did not agree to surgery and decided to continue medical management and comfort care.

## 2021-01-23 NOTE — H&P ADULT - PROBLEM SELECTOR PLAN 2
pt p/w high blood sugar of 459, fingerstick>500  s/p 5u admelog in ED  pt is on Janumet at home  will start HSS for now  monitor fingerstick closely  f/u A1c

## 2021-01-23 NOTE — H&P ADULT - PROBLEM SELECTOR PLAN 6
discussed with family, patient DNR.DNI and want to keep patient pain free and comfortable  c/w medical management otherwise  Palliative consulted, spoke to Dr Escobedo.

## 2021-01-23 NOTE — CONSULT NOTE ADULT - ASSESSMENT
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   - spoke to family grand son and grand daughter in law over phone [phone], who are leaning towards palliative care.       PT doesnot qualify for ICU Level of care at this point, will recommend admit to medicine .

## 2021-01-23 NOTE — H&P ADULT - PROBLEM SELECTOR PLAN 1
patient coming in with decreased PO intake  CT abdomen showed new, small bowel pneumatosis and portal venous gas, concerning for small bowel ischemia/necrosis. 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  WBC count 10.96k, lactate 2.6,  on admission (meeting SIRS criteria 2/3)  s/p zosyn and 2.5L bolus in ED  surgery evaluated the patient,  high risk for Exploratory laparotomy with massive small bowel resection,   Pt has poor prognosis  keep patient NPO  will continue with medical management with trial of antibiotics for now  c/w zosyn  c/w gentle hydration  follow blood cultures

## 2021-01-23 NOTE — ED PROVIDER NOTE - PHYSICAL EXAMINATION
General: pt lying in stretcher, appears stated age and is not in distress  HEENT: AT/NC, pink conjunctiva, anicteric sclerae, EOMI, PERRLA, dry mm  Neck: supple, full ROM, trachea midline, no JVD, no cervical LAD, no midline ttp or stepoffs  Lungs: symmetric excursion, b/l clear vesicular breath sounds with no wheezes, crackles, or rhonchi  Heart: tachycardic regular rhythm, S1, S2 normal; no S3 or S4; no murmurs or rubs  Abd: normal bowel sounds; soft, + generalized tenderness; no masses  Back: no midline spinal tenderness or stepoffs, no costovertebral angle tenderness  Extremities: no clubbing, cyanosis, or edema; no palpable deformities or fractures  Skin: good turgor; no rashes, petechiae, ecchymoses, or jaundice  Pulses: radial, posterior tibialis (PT), dorsalis pedis (DP) all 2+ & symmetric  Neuro: awake, alert, responsive; oriented to person, place and time; cranial nerves intact, EOMI, intact jaw movement, intact facial sensation, no facial asymmetry, hearing intact; no nystagmus, tongue midline; Motor: Normal tone in upper and lower extremities bilaterally strength 5/5; Sensory: intact

## 2021-01-23 NOTE — H&P ADULT - ASSESSMENT
Patient is an 88 year old female from home, with daughter as HHA, with PMHx of HTN, DM, CHFpEF, dementia presents to the ED after being sent by daughter for decreased PO intake.   CT shows pneumatosis intestinalis involving at least 2/3 of the small bowel with portal venous air. 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

## 2021-01-23 NOTE — ED ADULT TRIAGE NOTE - CHIEF COMPLAINT QUOTE
as per EMS daughter called " she's not talking much the last few days, nauseous, not eating "  Room air @ 95%

## 2021-01-23 NOTE — H&P ADULT - HISTORY OF PRESENT ILLNESS
Patient is an 88 year old female from home, with daughter as HHA, with PMHx of HTN, DM, CHFpEF, dementia presents to the ED after being sent by daughter for decreased PO intake.  Patient is an 88 year old female from home, with daughter as HHA, with PMHx of HTN, DM, CHFpEF, dementia presents to the ED after being sent by daughter for decreased PO intake. History obtained from daughter over phone as patient unable to provide history due to dementia. According to daughter, patient has had poor PO intake along with nausea and gagging but no vomiting. Patient has not been sleeping well or eating well for past week. Patient walks with assistance to bathroom at baseline. Patient is on Januvia at home for diabetes and took it yesterday morning last. Denies patient having any fever, cough, chills, diarrhea or constipation. Patient reported having abdominal pain in the ED earlier.      Patient is an 88 year old female from home, with daughter as HHA, with PMHx of HTN, DM, CHFpEF, dementia presents to the ED after being sent by daughter for decreased PO intake. History obtained from daughter over phone as patient unable to provide history due to dementia. According to daughter, patient has had poor PO intake along with nausea and gagging but no vomiting. Patient has not been sleeping well or eating well for past week. Patient walks with assistance to bathroom at baseline. Patient is on Janumet at home for diabetes and took it yesterday morning last. Denies patient having any fever, cough, chills, diarrhea or constipation. Patient reported having abdominal pain in the ED earlier.     GOC: Spoke to daughter Cordell and grandson Sha who is HCP at [phone]. They want the patient to be DNR/DNI and want to take her home for her final days.

## 2021-01-23 NOTE — ED PROVIDER NOTE - PMH
(HFpEF) heart failure with preserved ejection fraction    Diabetes    HTN (hypertension)    Myositis  on prednisone x 5 years  Osteoarthritis    TIA (transient ischemic attack)

## 2021-01-23 NOTE — ED ADULT NURSE NOTE - NSIMPLEMENTINTERV_GEN_ALL_ED
Implemented All Fall with Harm Risk Interventions:  [place] to call system. Call bell, personal items and telephone within reach. Instruct patient to call for assistance. Room bathroom lighting operational. Non-slip footwear when patient is off stretcher. Physically safe environment: no spills, clutter or unnecessary equipment. Stretcher in lowest position, wheels locked, appropriate side rails in place. Provide visual cue, wrist band, yellow gown, etc. Monitor gait and stability. Monitor for mental status changes and reorient to person, place, and time. Review medications for side effects contributing to fall risk. Reinforce activity limits and safety measures with patient and family. Provide visual clues: red socks.

## 2021-01-23 NOTE — DIETITIAN INITIAL EVALUATION ADULT. - REASON
stated Nutrition focused physical exam deferred at this time 2/2 precautionary measures surrounding the current COVID-19 pandemic

## 2021-01-23 NOTE — CONSULT NOTE ADULT - ATTENDING COMMENTS
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   - spoke to family grand son and grand daughter in law over phone [phone], who are leaning towards palliative care.       PT doesnot qualify for ICU Level of care at this point, will recommend admit to medicine .

## 2021-01-23 NOTE — CONSULT NOTE ADULT - SUBJECTIVE AND OBJECTIVE BOX
Patient is a 88y old  Female who presents with a chief complaint of abdominal pain   HPI: 87 yo f hx of dm, chf, tia, oa, bibems after daughter reported that she has not been eating, complaining of nausea, and not talking as much as she normally does. Pt is unable to provide any detailed history 2/2 AMS but reports abdominal pain.      REVIEW OF SYSTEMS:  unable to review,     PAST MEDICAL & SURGICAL HISTORY:  (HFpEF) heart failure with preserved ejection fraction  TIA (transient ischemic attack)  Myositis: on prednisone x 5 years  Osteoarthritis  HTN (hypertension)  Diabetes    No significant past surgical history      FAMILY HISTORY:  No pertinent family history in first degree relatives      MEDICATIONS  (STANDING):    MEDICATIONS  (PRN):      PHYSICAL EXAM:  GENERAL: malnourished female in mild distress in bed  HEENT: Normocephalic; temporal wasting present,   conjunctivae and sclerae clear; moist mucous membranes;   NECK : supple  CHEST/LUNG: Clear to auscultation bilaterally with good air entry   HEART: tachycardic   ABDOMEN: generalized abdominal tender present, non distended,   EXTREMITIES: no cyanosis; no edema; no calf tenderness  SKIN: dry; no rash  NERVOUS SYSTEM:  Awake, AAOx1     Vital Signs Last 24 Hrs  T(C): 36.1 (23 Jan 2021 11:29), Max: 36.7 (23 Jan 2021 07:10)  T(F): 97 (23 Jan 2021 11:29), Max: 98 (23 Jan 2021 07:10)  HR: 92 (23 Jan 2021 14:49) (92 - 142)  BP: 160/72 (23 Jan 2021 14:37) (144/81 - 188/90)  BP(mean): --  RR: 18 (23 Jan 2021 11:29) (16 - 18)  SpO2: 95% (23 Jan 2021 11:29) (95% - 99%)  CBC Full  -  ( 23 Jan 2021 07:39 )  WBC Count : 10.96 K/uL  RBC Count : 4.92 M/uL  Hemoglobin : 11.0 g/dL  Hematocrit : 37.7 %  Platelet Count - Automated : 422 K/uL  Mean Cell Volume : 76.6 fl  Mean Cell Hemoglobin : 22.4 pg  Mean Cell Hemoglobin Concentration : 29.2 gm/dL  Auto Neutrophil # : 10.52 K/uL  Auto Lymphocyte # : 0.33 K/uL  Auto Monocyte # : 0.00 K/uL  Auto Eosinophil # : 0.00 K/uL  Auto Basophil # : 0.00 K/uL  Auto Neutrophil % : 96.0 %  Auto Lymphocyte % : 3.0 %  Auto Monocyte % : 0.0 %  Auto Eosinophil % : 0.0 %  Auto Basophil % : 0.0 %      01-23    132<L>  |  94<L>  |  23<H>  ----------------------------<  459<HH>  4.5   |  24  |  0.76    Ca    9.5      23 Jan 2021 07:39    TPro  8.6<H>  /  Alb  2.9<L>  /  TBili  0.6  /  DBili  x   /  AST  16  /  ALT  20  /  AlkPhos  371<H>  01-23      < from: CT Abdomen and Pelvis w/ IV Cont (01.23.21 @ 10:30) >    EXAM:  CT ABDOMEN AND PELVIS IC                          EXAM:  CT ANGIO CHEST (W)AW IC                            PROCEDURE DATE:  01/23/2021          INTERPRETATION:  CTA CHEST WITH CONTRAST (CT PULMONARY EMBOLUS) AND CT ABDOMEN AND PELVIS WITH CONTRAST    INDICATION: Shortness of breath. Elevated BNP. Abdominal pain. Vomiting.    TECHNIQUE: Enhanced helical images were obtained of the chest. Coronal and sagittal images were reconstructed.  Images were obtained after the uneventful administration of 90 cc of nonionic intravenous contrast (Omnipaque 350).  10 cc of nonionic intravenous contrast (Omnipaque 350) was discarded. Maximum intensity projection images were generated.    COMPARISON: CT chest 12/11/2020 and 2/22/2015.    FINDINGS:    CHEST:    Pulmonary Artery:  The main pulmonary artery is normal in caliber. There is no main, central, lobar, segmental, or subsegmental pulmonary embolus.    Tubes/Lines: None.    Lungs, airways, and pleura: Bilateral pleural effusions and lower lobe passive atelectasis.    No pneumothorax. Biapical opacities. Linear atelectasis/scarring of the in the lingula and right middle lobe. The airways are unremarkable.    Mediastinum: The chest lymph nodes are unchanged. The visualized portion of the thyroid gland is unremarkable.   The esophagus is unremarkable.    Heart and Vasculature: Cardiomegaly. Myocardium is likely thickened. Mitral annular calcification. Mitral valve leaflet thickening. Aortic valve calcifications. Coronary artery calcifications.    Left-sided aortic arch and left-sided descending thoracic aorta. Aorta is ectatic. Aortic calcifications. The left subclavian artery is occluded just distal to its origin. The visualized segments the brachiocephalic artery and left common carotid artery are patent.    Bones And Soft Tissues: Degenerative change of the spine.      ABDOMEN AND PELVIS:    Liver: Portal venous gas, best shown in the left lobe of the liver. The remainder of the liver is normal.    Spleen: The spleen is normal.    Gallbladder: The gallbladder is normal.    Adrenal glands: The adrenal glands are unremarkable.    Pancreas: Calcifications in the body and tail of the pancreas are unchanged. The remainder the pancreas is unremarkable.    Kidneys: Bilateral hypodense renal lesions are statistically benign and likely represent cysts. The kidneys are otherwise normal. No hydronephrosis or hydroureter.    Bowel: The stomach is distended. Small bowel pneumatosis. Small bowel measures up to 2.9 cm. Appendix is not definitively visualized. The large bowel is unremarkable. Umbilical hernia containing fat. No free air. No free fluid.    Vascular: The abdominal aorta is normal in caliber. Atheromatous disease of the aorta.    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    The SMA is patent at its origin, however, the vessel is occluded approximately 3 cm from its origin, findings that are unchanged.    The ANA is not well evaluated at its origin, however, the visualized segments of the ANA are patent (series 17 image 55).    The hepatic veins are patent. The portal veins are patent.    Bladder: The bladder is distended.    Uterus and adnexa: The adnexa are not well evaluated. Calcified uterine myomas.    Skeletal: No change in the anterolisthesis of L5 on S1. No change in the deformity of the coccyx. Degenerative change of the spine.    IMPRESSION:    CHEST:  1.  No pulmonary embolus.  2.  Bilateral pleural effusions and atelectasis.      ABDOMEN AND PELVIS:  1.  New, small bowel pneumatosis and portal venous gas. These findings are concerning for small bowel ischemia/necrosis.  2.  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  3.  The findings were discussed with and read back verification obtained from Dr. Ramirez on 1/23/2021 at 1149 hours.            BENEDICTO CUMMINGS MD; Attending Radiologist  This document has been electronically signed. Jan 23 2021 11:49AM    < end of copied text >

## 2021-01-23 NOTE — ED PROVIDER NOTE - OBJECTIVE STATEMENT
87 yo f hx of dm, chf, tia, oa, bibems after daughter reported that she has not been eating, complaining of nausea, and not talking as much as she normally does. Pt is unable to provide any detailed history 2/2 AMS but reports abdominal pain.

## 2021-01-23 NOTE — H&P ADULT - NSICDXNOFAMILYHX_GEN_ALL_CORE
<-- Click to add NO pertinent Family History
Spine appears normal, range of motion is not limited, no muscle or joint tenderness

## 2021-01-23 NOTE — H&P ADULT - NSHPPHYSICALEXAM_GEN_ALL_CORE
General - NAD, sitting up in bed, elderly, frail, no respiratory distress. AAOx1  Cardiovascular - +s1/s2   Lungs - Clear to auscultation, no use of accessory muscles, no rales or crackles   Abdomen - soft, not distended, diffuse tenderness on exam  Neurological – Alert and oriented x 1  Extremities- no pedal edema

## 2021-01-23 NOTE — ED PROVIDER NOTE - CLINICAL SUMMARY MEDICAL DECISION MAKING FREE TEXT BOX
Pt w/ aforementioned presentation concerning for but not limited to dehydration, pna, uti, gi infection, acute abdomen, appendicitis, colitis, diverticulitis. Will get labs, imaging, ivf, monitor and reassess.     pt signed out to Dr. Ramirez

## 2021-01-23 NOTE — ED PROVIDER NOTE - PROGRESS NOTE DETAILS
endorsed to me. javan toussaint from surg. will see pt promptly, javan whitehead from icu, will see pt. arterial occlusions appear not to be changed and given that pt may be going to OR will hold any heparin infusion until pt seen by surg. -Sal Ramirez MD- icu still at bedside but likely will rec for med admit. surg saw pt and states that pt very poor surgical candidate and family still deciding if they are agreeable . dr reese has discussed the case w dr farris from vasc surg. will review imaging and get back to us - unclear if ac is needed - will get back on this. surg states they dw family and prob will sign off. rec for admit to med for pal care - given the likely focus on comfort and the chronic nature of the occlusion will hold ac. tricia marks dr to dw her the case

## 2021-01-25 ENCOUNTER — NON-APPOINTMENT (OUTPATIENT)
Age: 86
End: 2021-01-25

## 2021-01-25 NOTE — DISCHARGE NOTE FOR THE EXPIRED PATIENT - HOSPITAL COURSE
Patient is an 88 year old female from home, with daughter as HHA, with PMHx of HTN, DM, CHFpEF, dementia presents to the ED after being sent by daughter for decreased PO intake. History obtained from daughter over phone as patient unable to provide history due to dementia. According to daughter, patient has had poor PO intake along with nausea and gagging but no vomiting. Patient has not been sleeping well or eating well for past week. Patient walks with assistance to bathroom at baseline. Patient is on Janumet at home for diabetes and took it yesterday morning last. Denies patient having any fever, cough, chills, diarrhea or constipation. Patient reported having abdominal pain in the ED earlier and was found to have small bowel ischemia and necrosis 2/2 small bowel pneumatosis. Surgical team was consulted and recommended medical management as patient is a poor surgical candidate. Findings were discussed with family and patient to be made comfort care and pending home hospice.    1/24/21:   Called to see patient for unresponsiveness. On exam the patine did not respond to verbal or physical stimuli. Absent heart and breath sounds. Absent peripheral pulses. Pupils fixed and dilated. Patient pronounced dead at 20:01. Attending informed. Next of kin notified. Autopsy denied. Organ donation documented above.

## 2021-01-30 PROBLEM — G30.9 ALZHEIMER'S DEMENTIA: Status: ACTIVE | Noted: 2020-01-01

## 2021-01-30 PROBLEM — E87.1 HYPONATREMIA: Status: ACTIVE | Noted: 2020-01-01

## 2021-01-30 PROBLEM — I34.0 MITRAL VALVE INSUFFICIENCY, UNSPECIFIED ETIOLOGY: Status: ACTIVE | Noted: 2020-01-01

## 2021-01-30 PROBLEM — E78.5 HLD (HYPERLIPIDEMIA): Status: ACTIVE | Noted: 2020-01-01

## 2021-01-30 PROBLEM — K25.7 CHRONIC GASTRIC ULCER, UNSPECIFIED WHETHER GASTRIC ULCER HEMORRHAGE OR PERFORATION PRESENT: Status: ACTIVE | Noted: 2021-01-01

## 2021-01-30 PROBLEM — I50.32 CHRONIC DIASTOLIC CONGESTIVE HEART FAILURE: Status: ACTIVE | Noted: 2020-01-01

## 2021-01-30 PROBLEM — I73.9 PAD (PERIPHERAL ARTERY DISEASE): Status: ACTIVE | Noted: 2020-01-01

## 2021-01-30 PROBLEM — I10 BENIGN ESSENTIAL HTN: Status: ACTIVE | Noted: 2020-01-01

## 2021-01-30 PROBLEM — E11.9 TYPE 2 DIABETES MELLITUS WITHOUT COMPLICATION, WITHOUT LONG-TERM CURRENT USE OF INSULIN: Status: ACTIVE | Noted: 2020-01-01

## 2021-01-30 PROBLEM — D50.9 MICROCYTIC ANEMIA: Status: ACTIVE | Noted: 2020-01-01

## 2021-01-30 NOTE — HISTORY OF PRESENT ILLNESS
[Family Member] : family member [de-identified] : 87 y.o F w/PMHx of severe dementia A& o X 2, walking with cane, diastolic CHF, DMII, HTN,Hx of stroke in 1990,  prior stable meningoma, B/L femoral stents in 2010, and CAD X1 S/P cardiac stent  in 2010 comes in to follow up  for hospitalization; \par \par pt was in [facility] hospitalized in 06/28/2020- 06/02/2020 due to AMS; she was test covid19 with negative results; per pt 's daughter, pt had CT head done and stated that he results was negative; pt was also evaluated by neuro during the hospitalization per daughter and was diagnosed with advanced dementia; \par report form [facility]: \par CT head 5/28/20:\par IMPRESSION:\par No acute intracranial pathology.\par No evidence for intracranial hemorrhage or mass.\par Stable appearing calcified meningioma, left parietal convexity.\par Prominence of the ventricles and sulci are consistent with age-appropriate\par volume loss. Mild microvascular ischemic disease within the hemispheric\par white matter is slightly increased from prior imaging.\par \par MRI brain with and without contrast, 12/13/17:\par IMPRESSION:\par 1. MINIMAL CHRONIC ISCHEMIC CHANGE, SUPRATENTORIAL WHITE MATTER.\par 2. INCIDENTAL 13 X 10 MM CALCIFIED MENINGIOMA, RIGHT POSTERIOR PARIETAL\par MEDIAL CONVEXITY.\par 2. NO ACUTE INFARCT, PARENCHYMAL MASS, HEMORRHAGE OR HYDROCEPHALUS.\par \par has underline dementia; daughter noticed that pt;s mental status change in 01/2020 and since then more  lethargy and worsening of memory loss; loss of appetite and weight loss\par \par pt is current saw Neuro and pending for MRI and labs\par \par Last TTE is 10/2019 showed mild to moderate mitral valve regurgitation, mild to moderate aortic regurgitation, severe dilate Lt atrium;   and grade II diastolic dysfunction\par \par \par has never had colonoscopy or mammo done before; \par \par has not saw [facility]ARD[facility] since  2010; Saw ophthal in 01/2020; saw podiatry in 07/2020( every 2 months)\par \par daughter act as home aid; need help with all ADLs and IADLs by daughter; \par \par \par interval HX: 08/01/2020:\par \par pt is at baseline; no complaints; \par daughter stopped the glimepiride and escitalopram; \par \par \par interval Hx 01/08/2021\par \par \par Pt was HOSPITALIZED IN 12/11/2020 -12/21/2020 DUE TO CHF exacerbation; \par \par had CTA/CTAB/ ct head showed grossly negative result but pulmonary edema; \par \par had TTE showed dilated Lt atrium \par  grade II diastolic dysfunction\par \par EF 55% pleural effusion; \par \par had EKG too in hospital \par \par had EGD /colonoscopy done showed diverticulosis and gastric ulcer\par \par STILL ANEMIA WITH h/h 8.8 AND CMP CHRONIC persistent  MILD HYPONATREMIA 133;\par  HA1C 6.8; \par \par NOW BREATHING GOOD;No acute distress ; HAS NOT FOLLOW UP WITH [facility] YET\par \par Pt speak Sinhala and accompany by her daughter Cordell Koroma: who is her aid which pt prefer to acT AS HER  \par \par son Heather valdez as proxy \par \par

## 2021-01-30 NOTE — PHYSICAL EXAM
[No Acute Distress] : no acute distress [Well Nourished] : well nourished [Well Developed] : well developed [Normal Sclera/Conjunctiva] : normal sclera/conjunctiva [PERRL] : pupils equal round and reactive to light [EOMI] : extraocular movements intact [No JVD] : no jugular venous distention [No Respiratory Distress] : no respiratory distress  [No Accessory Muscle Use] : no accessory muscle use [Clear to Auscultation] : lungs were clear to auscultation bilaterally [Normal Rate] : normal rate  [Regular Rhythm] : with a regular rhythm [Normal S1, S2] : normal S1 and S2 [No Murmur] : no murmur heard [No Carotid Bruits] : no carotid bruits [No Abdominal Bruit] : a ~M bruit was not heard ~T in the abdomen [Pedal Pulses Present] : the pedal pulses are present [Soft] : abdomen soft [Non Tender] : non-tender [Non-distended] : non-distended [Normal Bowel Sounds] : normal bowel sounds [Normal Posterior Cervical Nodes] : no posterior cervical lymphadenopathy [Normal Anterior Cervical Nodes] : no anterior cervical lymphadenopathy [No CVA Tenderness] : no CVA  tenderness [No Spinal Tenderness] : no spinal tenderness [No Joint Swelling] : no joint swelling [Grossly Normal Strength/Tone] : grossly normal strength/tone [Coordination Grossly Intact] : coordination grossly intact [No Focal Deficits] : no focal deficits [Normal Gait] : normal gait [de-identified] : on wheel chair [de-identified] : a& o X1 chronic

## 2021-01-30 NOTE — HEALTH RISK ASSESSMENT
[No] : In the past 12 months have you used drugs other than those required for medical reasons? No [One fall no injury in past year] : Patient reported one fall in the past year without injury [0] : 2) Feeling down, depressed, or hopeless: Not at all (0) [Patient declined PAP Smear] : Patient declined PAP Smear [Patient declined bone density test] : Patient declined bone density test [Patient declined colonoscopy] : Patient declined colonoscopy [HIV Test offered] : HIV Test offered [Hepatitis C test offered] : Hepatitis C test offered [] : No [[id number]] : 0 [Change in mental status noted] : No change in mental status noted [Language] : denies difficulty with language [Behavior] : denies difficulty with behavior [Handling Complex Tasks] : denies difficulty handling complex tasks [Reasoning] : denies difficulty with reasoning [Spatial Ability and Orientation] : denies difficulty with spatial ability and orientation [de-identified] : nend help for all er adls ON WHEEL CHAIR OUTdoor OR INDOOR  [de-identified] : need for help for all IADLs

## 2021-01-30 NOTE — ASSESSMENT
[FreeTextEntry1] : HAD covid 19 ab in 06/2020 negative per daughter\par \par per daughter, pt ahd pneumonia shot [id number]; \par \par LETTER FOR HANDICAP AND POWER ATTERNY PROVIDED \par \par pt is current DNR/DNI

## 2021-02-08 ENCOUNTER — APPOINTMENT (OUTPATIENT)
Dept: CARDIOLOGY | Facility: CLINIC | Age: 86
End: 2021-02-08

## 2021-04-09 ENCOUNTER — APPOINTMENT (OUTPATIENT)
Dept: INTERNAL MEDICINE | Facility: CLINIC | Age: 86
End: 2021-04-09
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
## 2021-08-20 NOTE — ED ADULT TRIAGE NOTE - CADM TRG TX PRIOR TO ARRIVAL
Spoke to both patient and wife who stated they were unaware that patient was scheduled today to recieve Holter monitor at [facility]. Advised patient OK to have Holter completed thru [facility] if more convenient for patient. Patient and wife aware and verbalized understanding.   none

## 2023-06-15 NOTE — PHYSICAL THERAPY INITIAL EVALUATION ADULT - STANDING BALANCE: STATIC
good minus Thalidomide Counseling: I discussed with the patient the risks of thalidomide including but not limited to birth defects, anxiety, weakness, chest pain, dizziness, cough and severe allergy.

## 2023-07-10 NOTE — H&P ADULT. - BACK EXAM
OPERATIVE REPORT      PREOPERATIVE DIAGNOSIS:   Colonoscopy screening    POSTOPERATIVE DIAGNOSIS:   Hemorrhoids  Sigmoid diverticulosis     PROCEDURE PERFORMED:   Colonoscopy      PATIENT:  Meaghan Nelson 60 year old female    MRN:  [id number]    SURGEON:   Luan Haines DO     ASSISTANT:   abnormalities were appreciated.  The colonoscope was slowly withdrawn reviewing all surface mucosal anatomy.  No additional pathology was appreciated.  Upon returning to the rectum, the colonoscope was retroflexed.  No additional pathology was appreciated.  The colonoscope was straightened, as much air as possible was evacuated, and the colonoscope was removed.  The patient tolerated the procedure well.    RECOMMENDATIONS:   · Follow up with primary care as scheduled/as needed.  · Call my office with questions or concerns at [phone]  · Avoid nonsteroidal anti-inflammatory drugs.  · Patient to increase oral fluid intake while taking fiber.  · Repeat colonoscopy in 10 years.    Luan Haines DO  7/10/2023 7:59 AM     normal/normal shape/ROM intact

## 2023-10-01 PROBLEM — I63.81 LACUNAR INFARCTION: Status: ACTIVE | Noted: 2020-01-01

## 2023-10-07 NOTE — PROGRESS NOTE ADULT - PROBLEM SELECTOR PLAN 4
PIV inserted, infusion started.  Pt resting in rney with no current needs.  
Management as per primary team.
f/u HbA1C  monitor FS   ihss  diabetic diet
holding home janumet  HbA1C 8.4  monitor FS ac hs  diabetic diet  Endo eval noted   Lantus 10 units Hs   sliding scale
holding home janumet  f/u HbA1C 8.4  monitor FS ac hs  diabetic diet  Endo eval noted   Lantus 10 units Hs   sliding scale

## 2024-02-06 NOTE — ED ADULT TRIAGE NOTE - CADM TRG TX PRIOR TO ARRIVAL
Nursing staff confirmed patient with full name and . Prepared patient for visit today by obtaining vitals, verifying medication list and allergies, and briefly discussing reason for visit.       Chief Complaint   Patient presents with    Hypertension    Gastroesophageal Reflux    Asthma    Diabetes    Cholesterol Problem    Back Pain       1. \"Have you been to the ER, urgent care clinic since your last visit?  Hospitalized since your last visit?\" YES    2. \"Have you seen or consulted any other health care providers outside of the [facility] since your last visit?\" SM    3. For patients aged 45-75: Has the patient had a colonoscopy / FIT/ Cologuard? no      If the patient is female:    4. For patients aged 40-74: Has the patient had a mammogram within the past 2 years? yes      5. For patients aged 21-65: Has the patient had a pap smear? yes  
Subjective:      Patient ID: Crissy Bower is a 65 y.o. female here for follow-up.  She went to emergency room after feeling fatigue.  She had COVID infection several days back, now she is not coughing a lot.  She has received Lagevrio for COVID.  Cough has improved.  But feeling fatigue.  Lab work done in the hospital, was stable.  She was sent home.  She been doing okay right now.  Appetite is back.  Blood sugar running okay.  Hypertension, compliant with medication.  Denies chest pain palpitation shortness of breath.  He is suffer from chronic sinusitis.  Would like to see an ENT specialist in [facility].  Back hurts, she is taking Tylenol as needed.    Hypertension    Gastroesophageal Reflux    Asthma  Her past medical history is significant for asthma.   Diabetes    Back Pain        Review of Systems   Constitutional: Negative.    HENT: Negative.     Eyes: Negative.    Respiratory: Negative.     Cardiovascular: Negative.    Gastrointestinal: Negative.    Endocrine: Negative.    Genitourinary: Negative.    Musculoskeletal:  Positive for back pain.   Neurological: Negative.    Hematological: Negative.    Psychiatric/Behavioral: Negative.         Objective:   Physical Exam  Constitutional:       Appearance: Normal appearance. She is obese.   Cardiovascular:      Rate and Rhythm: Normal rate and regular rhythm.      Pulses: Normal pulses.      Heart sounds: Normal heart sounds.   Pulmonary:      Effort: Pulmonary effort is normal.      Breath sounds: Normal breath sounds.   Abdominal:      General: Abdomen is flat. Bowel sounds are normal.      Palpations: Abdomen is soft.   Musculoskeletal:         General: Normal range of motion.      Cervical back: Normal range of motion and neck supple.   Skin:     General: Skin is warm.   Neurological:      General: No focal deficit present.      Mental Status: She is alert and oriented to person, place, and time. Mental status is at baseline.   Psychiatric:         
none

## 2024-02-21 NOTE — PATIENT PROFILE ADULT - HAS THE PATIENT USED TOBACCO IN THE PAST 30 DAYS?
Please call pt, cxr is normal, negative for pneumonia Unable to assess due to patient's cognitive impairment

## 2024-03-28 NOTE — PHYSICAL THERAPY INITIAL EVALUATION ADULT - PHYSICAL ASSIST/NONPHYSICAL ASSIST: GAIT, REHAB EVAL
Obstetric/Gynecology Maternal Fetal Medicine Resident Note    Pt evaluated. Pt reports for routine US. Initial pulse 110 BPM, repeat still elevated at 106 BPM. Pt denies CP, new SOB, dizziness, lightheadedness, or palpitations. Encouraged close follow up with primary OB provider and to present to the ED if any concerning symptoms develop. Her next [facility] appointment is 4/23/24.     Assessment/plan discussed with Dr. Elias who is in agreement.     Rosana Guzman MD  OBGYN Resident, PGY1  [facility]  3/28/2024, 3:16 PM           verbal cues

## 2024-05-06 NOTE — ED ADULT TRIAGE NOTE - NS ED NURSE BANDS TYPE
"Medical screening examination initiated.  I have conducted a focused provider triage encounter, findings are as follows:    Brief history of present illness:  Pt reports being told to come back to the [facility] ED for a repeat test for her pregnancy. Seen in the ED on 5/2. Denies pelvic pain or bleeding.    Vitals:    05/06/24 1602   BP: 114/68   BP Location: Right arm   Patient Position: Sitting   Pulse: 68   Resp: 20   Temp: 98.4 °F (36.9 °C)   TempSrc: Oral   SpO2: 100%   Weight: 64.7 kg (142 lb 10.2 oz)   Height: 5' 4" (1.626 m)       Pertinent physical exam:      Brief workup plan:  Diagnostic evaluation    Preliminary workup initiated; this workup will be continued and followed by the physician or advanced practice provider that is assigned to the patient when roomed.  "
Name band;

## 2024-10-08 NOTE — ED ADULT NURSE NOTE - ATTEMPT TO OOB
no
Samples Given: Cabtreo
Initiate Treatment: ZithromaxMonyo
Detail Level: Zone
Render In Strict Bullet Format?: No

## 2025-01-21 NOTE — ED ADULT NURSE REASSESSMENT NOTE - MUSCULOSKELETAL WDL
Loss of subcutaneous fat.../Loss of muscle...
Full range of motion of upper and lower extremities, no joint tenderness/swelling.

## 2025-03-03 NOTE — CONSULT NOTE ADULT - EXTREMITIES
Pt reporting intermittent chest pain that radiates to the back and sob that started on Thursday   
detailed exam

## 2025-06-20 NOTE — DISCHARGE NOTE FOR THE EXPIRED PATIENT - ORGAN DONOR #
[id number] [facility]ident Reports  Signature requested  Dated:   6-18-25 at 6:55 PM    To be signed by Dr. Agueda Harris and faxed back to [phone].     Non clinical will fax once signed.

## 2025-06-23 NOTE — ED PROVIDER NOTE - CHIEF COMPLAINT
Spoke with patients daughter Michelle.  Informed of message, states understanding and she will speak with Ed to see how he is feeling and call the office back if symptoms do not improve.        
The patient is a 85y Female complaining of weakness.